# Patient Record
Sex: MALE | URBAN - METROPOLITAN AREA
[De-identification: names, ages, dates, MRNs, and addresses within clinical notes are randomized per-mention and may not be internally consistent; named-entity substitution may affect disease eponyms.]

---

## 2018-09-18 ENCOUNTER — HOSPITAL ENCOUNTER (INPATIENT)
Facility: HOSPITAL | Age: 83
LOS: 16 days | Discharge: HOME HEALTH | DRG: 369 | End: 2018-10-04
Attending: FAMILY MEDICINE | Admitting: FAMILY MEDICINE
Payer: COMMERCIAL

## 2018-09-18 DIAGNOSIS — N18.30 CKD (CHRONIC KIDNEY DISEASE), STAGE III (HCC): Chronic | ICD-10-CM

## 2018-09-18 DIAGNOSIS — R33.8 BENIGN PROSTATIC HYPERPLASIA WITH URINARY RETENTION: ICD-10-CM

## 2018-09-18 DIAGNOSIS — J30.9 ALLERGIC RHINITIS: ICD-10-CM

## 2018-09-18 DIAGNOSIS — R05.9 COUGH: ICD-10-CM

## 2018-09-18 DIAGNOSIS — J44.9 COPD (CHRONIC OBSTRUCTIVE PULMONARY DISEASE) (HCC): ICD-10-CM

## 2018-09-18 DIAGNOSIS — D50.8 OTHER IRON DEFICIENCY ANEMIA: ICD-10-CM

## 2018-09-18 DIAGNOSIS — I38 ENDOCARDITIS: Primary | ICD-10-CM

## 2018-09-18 DIAGNOSIS — N40.1 BENIGN PROSTATIC HYPERPLASIA WITH URINARY RETENTION: ICD-10-CM

## 2018-09-18 DIAGNOSIS — Z79.2 LONG TERM (CURRENT) USE OF ANTIBIOTICS: ICD-10-CM

## 2018-09-18 DIAGNOSIS — I25.10 CAD (CORONARY ARTERY DISEASE): ICD-10-CM

## 2018-09-18 DIAGNOSIS — I50.32 CHRONIC DIASTOLIC (CONGESTIVE) HEART FAILURE (HCC): ICD-10-CM

## 2018-09-18 DIAGNOSIS — I10 ESSENTIAL HYPERTENSION: Chronic | ICD-10-CM

## 2018-09-18 DIAGNOSIS — I25.2 STATUS POST NON-ST ELEVATION MYOCARDIAL INFARCTION (NSTEMI): ICD-10-CM

## 2018-09-18 DIAGNOSIS — E55.9 VITAMIN D DEFICIENCY, UNSPECIFIED: ICD-10-CM

## 2018-09-18 DIAGNOSIS — K59.00 CONSTIPATION, UNSPECIFIED CONSTIPATION TYPE: ICD-10-CM

## 2018-09-18 DIAGNOSIS — E11.9 TYPE 2 DIABETES MELLITUS WITHOUT COMPLICATION, WITHOUT LONG-TERM CURRENT USE OF INSULIN (HCC): Chronic | ICD-10-CM

## 2018-09-18 DIAGNOSIS — I48.91 ATRIAL FIBRILLATION (HCC): Chronic | ICD-10-CM

## 2018-09-18 PROBLEM — N40.0 BPH (BENIGN PROSTATIC HYPERPLASIA): Status: ACTIVE | Noted: 2018-09-18

## 2018-09-18 PROBLEM — I05.9 ENDOCARDITIS OF MITRAL VALVE: Status: ACTIVE | Noted: 2018-09-18

## 2018-09-18 PROBLEM — D64.9 ANEMIA: Status: ACTIVE | Noted: 2018-09-18

## 2018-09-18 PROBLEM — N40.0 BPH (BENIGN PROSTATIC HYPERPLASIA): Chronic | Status: ACTIVE | Noted: 2018-09-18

## 2018-09-18 PROBLEM — D64.9 ANEMIA: Chronic | Status: ACTIVE | Noted: 2018-09-18

## 2018-09-18 PROBLEM — R33.9 URINARY RETENTION: Status: ACTIVE | Noted: 2018-09-18

## 2018-09-18 PROBLEM — B37.81 CANDIDA ESOPHAGITIS (HCC): Status: ACTIVE | Noted: 2018-09-18

## 2018-09-18 LAB — GLUCOSE SERPL-MCNC: 185 MG/DL (ref 70–99)

## 2018-09-18 PROCEDURE — 99306 1ST NF CARE HIGH MDM 50: CPT | Performed by: HOSPITALIST

## 2018-09-18 PROCEDURE — 82948 REAGENT STRIP/BLOOD GLUCOSE: CPT

## 2018-09-18 RX ORDER — SIMVASTATIN 80 MG
80 TABLET ORAL
COMMUNITY
End: 2018-10-04 | Stop reason: HOSPADM

## 2018-09-18 RX ORDER — METOPROLOL SUCCINATE 50 MG/1
50 TABLET, EXTENDED RELEASE ORAL DAILY
COMMUNITY
End: 2018-10-04 | Stop reason: HOSPADM

## 2018-09-18 RX ORDER — DOCUSATE SODIUM 250 MG
250 CAPSULE ORAL DAILY
COMMUNITY
End: 2018-10-04 | Stop reason: HOSPADM

## 2018-09-18 RX ORDER — NITROGLYCERIN 0.4 MG/1
0.4 TABLET SUBLINGUAL
Status: DISCONTINUED | OUTPATIENT
Start: 2018-09-18 | End: 2018-10-04 | Stop reason: HOSPADM

## 2018-09-18 RX ORDER — ONDANSETRON 2 MG/ML
4 INJECTION INTRAMUSCULAR; INTRAVENOUS EVERY 6 HOURS PRN
Status: DISCONTINUED | OUTPATIENT
Start: 2018-09-18 | End: 2018-09-24

## 2018-09-18 RX ORDER — FLUTICASONE PROPIONATE 50 MCG
1 SPRAY, SUSPENSION (ML) NASAL DAILY
Status: DISCONTINUED | OUTPATIENT
Start: 2018-09-19 | End: 2018-10-04 | Stop reason: HOSPADM

## 2018-09-18 RX ORDER — LOSARTAN POTASSIUM 50 MG/1
100 TABLET ORAL DAILY
Status: DISCONTINUED | OUTPATIENT
Start: 2018-09-19 | End: 2018-09-24

## 2018-09-18 RX ORDER — FINASTERIDE 5 MG/1
5 TABLET, FILM COATED ORAL DAILY
Status: DISCONTINUED | OUTPATIENT
Start: 2018-09-19 | End: 2018-10-04 | Stop reason: HOSPADM

## 2018-09-18 RX ORDER — PANTOPRAZOLE SODIUM 40 MG/1
40 TABLET, DELAYED RELEASE ORAL
Status: DISCONTINUED | OUTPATIENT
Start: 2018-09-19 | End: 2018-09-24

## 2018-09-18 RX ORDER — MELATONIN
1000 DAILY
Status: DISCONTINUED | OUTPATIENT
Start: 2018-09-19 | End: 2018-10-04 | Stop reason: HOSPADM

## 2018-09-18 RX ORDER — POTASSIUM CHLORIDE 20 MEQ/1
20 TABLET, EXTENDED RELEASE ORAL 2 TIMES DAILY
Status: DISCONTINUED | OUTPATIENT
Start: 2018-09-18 | End: 2018-09-24

## 2018-09-18 RX ORDER — VALSARTAN 160 MG/1
160 TABLET ORAL DAILY
COMMUNITY
End: 2018-10-04 | Stop reason: HOSPADM

## 2018-09-18 RX ORDER — FLUTICASONE PROPIONATE 50 MCG
1 SPRAY, SUSPENSION (ML) NASAL DAILY
COMMUNITY
End: 2018-10-04 | Stop reason: HOSPADM

## 2018-09-18 RX ORDER — ACETAMINOPHEN 325 MG/1
650 TABLET ORAL EVERY 6 HOURS PRN
Status: DISCONTINUED | OUTPATIENT
Start: 2018-09-18 | End: 2018-10-04 | Stop reason: HOSPADM

## 2018-09-18 RX ORDER — DOCUSATE SODIUM 100 MG/1
100 CAPSULE, LIQUID FILLED ORAL 2 TIMES DAILY
Status: DISCONTINUED | OUTPATIENT
Start: 2018-09-18 | End: 2018-10-04 | Stop reason: HOSPADM

## 2018-09-18 RX ORDER — PANTOPRAZOLE SODIUM 40 MG/1
40 TABLET, DELAYED RELEASE ORAL DAILY
COMMUNITY
End: 2018-10-04 | Stop reason: HOSPADM

## 2018-09-18 RX ORDER — DIPHENOXYLATE HYDROCHLORIDE AND ATROPINE SULFATE 2.5; .025 MG/1; MG/1
1 TABLET ORAL DAILY
COMMUNITY

## 2018-09-18 RX ORDER — FINASTERIDE 5 MG/1
5 TABLET, FILM COATED ORAL DAILY
COMMUNITY
End: 2018-10-04 | Stop reason: HOSPADM

## 2018-09-18 RX ORDER — ATORVASTATIN CALCIUM 40 MG/1
40 TABLET, FILM COATED ORAL
Status: DISCONTINUED | OUTPATIENT
Start: 2018-09-19 | End: 2018-10-04 | Stop reason: HOSPADM

## 2018-09-18 RX ORDER — BENZONATATE 100 MG/1
200 CAPSULE ORAL 3 TIMES DAILY PRN
Status: DISCONTINUED | OUTPATIENT
Start: 2018-09-18 | End: 2018-10-04 | Stop reason: HOSPADM

## 2018-09-18 RX ORDER — POTASSIUM CHLORIDE 20 MEQ/1
20 TABLET, EXTENDED RELEASE ORAL 2 TIMES DAILY
COMMUNITY
End: 2018-10-04 | Stop reason: HOSPADM

## 2018-09-18 RX ORDER — MELATONIN
1000 DAILY
COMMUNITY
End: 2018-10-04 | Stop reason: HOSPADM

## 2018-09-18 RX ORDER — SENNOSIDES 8.6 MG
1 TABLET ORAL DAILY
Status: DISCONTINUED | OUTPATIENT
Start: 2018-09-19 | End: 2018-10-04 | Stop reason: HOSPADM

## 2018-09-18 RX ORDER — GLIPIZIDE 5 MG/1
5 TABLET ORAL
COMMUNITY
End: 2018-10-04 | Stop reason: HOSPADM

## 2018-09-18 RX ORDER — FUROSEMIDE 20 MG/1
20 TABLET ORAL DAILY
Status: DISCONTINUED | OUTPATIENT
Start: 2018-09-19 | End: 2018-09-24

## 2018-09-18 RX ORDER — METOPROLOL SUCCINATE 50 MG/1
50 TABLET, EXTENDED RELEASE ORAL DAILY
Status: DISCONTINUED | OUTPATIENT
Start: 2018-09-19 | End: 2018-10-04 | Stop reason: HOSPADM

## 2018-09-18 RX ORDER — GLIPIZIDE 5 MG/1
5 TABLET ORAL
Status: DISCONTINUED | OUTPATIENT
Start: 2018-09-19 | End: 2018-09-24

## 2018-09-18 RX ORDER — BENZONATATE 200 MG/1
200 CAPSULE ORAL 3 TIMES DAILY PRN
COMMUNITY
End: 2018-10-04 | Stop reason: HOSPADM

## 2018-09-18 RX ORDER — FLUCONAZOLE 100 MG/1
200 TABLET ORAL DAILY
Status: DISCONTINUED | OUTPATIENT
Start: 2018-09-19 | End: 2018-09-18

## 2018-09-18 RX ORDER — NITROGLYCERIN 0.4 MG/1
0.4 TABLET SUBLINGUAL
COMMUNITY

## 2018-09-18 RX ORDER — GUAIFENESIN 100 MG/5ML
200 SOLUTION ORAL 3 TIMES DAILY PRN
Status: DISCONTINUED | OUTPATIENT
Start: 2018-09-18 | End: 2018-09-24

## 2018-09-18 RX ORDER — FUROSEMIDE 20 MG/1
20 TABLET ORAL DAILY
COMMUNITY
End: 2018-10-04 | Stop reason: HOSPADM

## 2018-09-18 RX ADMIN — POTASSIUM CHLORIDE 20 MEQ: 20 TABLET, EXTENDED RELEASE ORAL at 20:51

## 2018-09-18 RX ADMIN — INSULIN LISPRO 1 UNITS: 100 INJECTION, SOLUTION INTRAVENOUS; SUBCUTANEOUS at 21:32

## 2018-09-18 RX ADMIN — AMPICILLIN SODIUM 2000 MG: 2 INJECTION, POWDER, FOR SOLUTION INTRAMUSCULAR; INTRAVENOUS at 21:25

## 2018-09-18 RX ADMIN — CEFTRIAXONE 2000 MG: 2 INJECTION, SOLUTION INTRAVENOUS at 20:51

## 2018-09-18 NOTE — ASSESSMENT & PLAN NOTE
With urinary retention continue Flomax and finasteride  Follow up with outpatient   Monitor for urinary tension

## 2018-09-18 NOTE — ASSESSMENT & PLAN NOTE
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

## 2018-09-18 NOTE — ASSESSMENT & PLAN NOTE
EGD from Aug 15, 2018 reported Candida esophagitis  Patient completed 4 days course of fluconazole  Continue PPI

## 2018-09-18 NOTE — ASSESSMENT & PLAN NOTE
Secondary to acute anemia ==> supply demand mismatch, type 2 NSTEMI  Status post 2 units of PRBC  Transfusion at [facility]  Echocardiogram from Sep 6, 2018 reported ejection fraction to be 55% normal left ventricle chamber size inferior and inferior lateral hypokinesis  Patient underwent EGD which did not reveal a source of bleeding colonoscopy was postponed due to acuty  of condition  No anticoagulation recommended  Recommended to continue aspirin however I did see aspirin on his discharge medication  Outpatient cardiology follow-up recommended ,I will defer need for possible inpatient cardiology consult to clarify need of aspirin to the primary team

## 2018-09-18 NOTE — ASSESSMENT & PLAN NOTE
Not a candidate for [facility] given the profound anemia required blood transfusion  Patient will follow up with PCP and Cardiology to re-evaluate [facility]  Continue heart rate control medication  Consider inpatient cardiology consult to clarify need of aspirin

## 2018-09-19 LAB
ALBUMIN SERPL BCP-MCNC: 2.8 G/DL (ref 3–5.2)
ALP SERPL-CCNC: 70 U/L (ref 43–122)
ALT SERPL W P-5'-P-CCNC: 23 U/L (ref 9–52)
ANION GAP SERPL CALCULATED.3IONS-SCNC: 3 MMOL/L (ref 5–14)
AST SERPL W P-5'-P-CCNC: 25 U/L (ref 17–59)
BILIRUB SERPL-MCNC: 0.3 MG/DL
BUN SERPL-MCNC: 35 MG/DL (ref 5–25)
CALCIUM SERPL-MCNC: 8.8 MG/DL (ref 8.4–10.2)
CHLORIDE SERPL-SCNC: 99 MMOL/L (ref 97–108)
CO2 SERPL-SCNC: 33 MMOL/L (ref 22–30)
CREAT SERPL-MCNC: 1.1 MG/DL (ref 0.7–1.5)
EOSINOPHIL # BLD AUTO: 0.94 THOUSAND/UL (ref 0–0.4)
EOSINOPHIL NFR BLD MANUAL: 17 % (ref 0–6)
ERYTHROCYTE [DISTWIDTH] IN BLOOD BY AUTOMATED COUNT: 18.2 %
EST. AVERAGE GLUCOSE BLD GHB EST-MCNC: 143 MG/DL
GFR SERPL CREATININE-BSD FRML MDRD: 59 ML/MIN/1.73SQ M
GLUCOSE P FAST SERPL-MCNC: 131 MG/DL (ref 70–99)
GLUCOSE SERPL-MCNC: 126 MG/DL (ref 70–99)
GLUCOSE SERPL-MCNC: 131 MG/DL (ref 70–99)
GLUCOSE SERPL-MCNC: 133 MG/DL (ref 70–99)
GLUCOSE SERPL-MCNC: 156 MG/DL (ref 70–99)
GLUCOSE SERPL-MCNC: 160 MG/DL (ref 70–99)
HBA1C MFR BLD: 6.6 % (ref 4.2–6.3)
HCT VFR BLD AUTO: 28 % (ref 41–53)
HGB BLD-MCNC: 9.5 G/DL (ref 13.5–17.5)
HYPERCHROMIA BLD QL SMEAR: PRESENT
LYMPHOCYTES # BLD AUTO: 0.83 THOUSAND/UL (ref 0.5–4)
LYMPHOCYTES # BLD AUTO: 15 % (ref 20–50)
MAGNESIUM SERPL-MCNC: 1.6 MG/DL (ref 1.6–2.3)
MCH RBC QN AUTO: 30.7 PG (ref 26.8–34.3)
MCHC RBC AUTO-ENTMCNC: 33.8 G/DL (ref 31.4–37.4)
MCV RBC AUTO: 91 FL (ref 80–100)
MONOCYTES # BLD AUTO: 0.33 THOUSAND/UL (ref 0.2–0.9)
MONOCYTES NFR BLD AUTO: 6 % (ref 1–10)
NEUTS BAND NFR BLD MANUAL: 3 % (ref 0–8)
NEUTS SEG # BLD: 3.41 THOUSAND/UL (ref 1.8–7.8)
NEUTS SEG NFR BLD AUTO: 59 %
PLATELET # BLD AUTO: 272 THOUSANDS/UL (ref 150–450)
PLATELET BLD QL SMEAR: ADEQUATE
PMV BLD AUTO: 7.2 FL (ref 8.9–12.7)
POTASSIUM SERPL-SCNC: 4.9 MMOL/L (ref 3.6–5)
PROT SERPL-MCNC: 5.5 G/DL (ref 5.9–8.4)
RBC # BLD AUTO: 3.09 MILLION/UL (ref 4.5–5.9)
RBC MORPH BLD: ABNORMAL
SODIUM SERPL-SCNC: 135 MMOL/L (ref 137–147)
TOTAL CELLS COUNTED SPEC: 100
WBC # BLD AUTO: 5.5 THOUSAND/UL (ref 4.31–10.16)

## 2018-09-19 PROCEDURE — 97530 THERAPEUTIC ACTIVITIES: CPT

## 2018-09-19 PROCEDURE — 97163 PT EVAL HIGH COMPLEX 45 MIN: CPT

## 2018-09-19 PROCEDURE — 97167 OT EVAL HIGH COMPLEX 60 MIN: CPT

## 2018-09-19 PROCEDURE — 97535 SELF CARE MNGMENT TRAINING: CPT

## 2018-09-19 PROCEDURE — 80053 COMPREHEN METABOLIC PANEL: CPT | Performed by: HOSPITALIST

## 2018-09-19 PROCEDURE — 83735 ASSAY OF MAGNESIUM: CPT | Performed by: HOSPITALIST

## 2018-09-19 PROCEDURE — 83036 HEMOGLOBIN GLYCOSYLATED A1C: CPT | Performed by: HOSPITALIST

## 2018-09-19 PROCEDURE — 85007 BL SMEAR W/DIFF WBC COUNT: CPT | Performed by: HOSPITALIST

## 2018-09-19 PROCEDURE — 82948 REAGENT STRIP/BLOOD GLUCOSE: CPT

## 2018-09-19 PROCEDURE — 85027 COMPLETE CBC AUTOMATED: CPT | Performed by: HOSPITALIST

## 2018-09-19 RX ADMIN — LOSARTAN POTASSIUM 100 MG: 50 TABLET, FILM COATED ORAL at 09:28

## 2018-09-19 RX ADMIN — METOPROLOL SUCCINATE 50 MG: 50 TABLET, EXTENDED RELEASE ORAL at 09:30

## 2018-09-19 RX ADMIN — SENNOSIDES 8.6 MG: 8.6 TABLET, FILM COATED ORAL at 09:30

## 2018-09-19 RX ADMIN — GLIPIZIDE 5 MG: 5 TABLET ORAL at 18:05

## 2018-09-19 RX ADMIN — FINASTERIDE 5 MG: 5 TABLET, FILM COATED ORAL at 09:30

## 2018-09-19 RX ADMIN — AMPICILLIN SODIUM 2000 MG: 2 INJECTION, POWDER, FOR SOLUTION INTRAMUSCULAR; INTRAVENOUS at 15:45

## 2018-09-19 RX ADMIN — POTASSIUM CHLORIDE 20 MEQ: 20 TABLET, EXTENDED RELEASE ORAL at 18:02

## 2018-09-19 RX ADMIN — FLUTICASONE PROPIONATE 1 SPRAY: 50 SPRAY, METERED NASAL at 09:31

## 2018-09-19 RX ADMIN — Medication 1 TABLET: at 09:28

## 2018-09-19 RX ADMIN — AMPICILLIN SODIUM 2000 MG: 2 INJECTION, POWDER, FOR SOLUTION INTRAMUSCULAR; INTRAVENOUS at 09:24

## 2018-09-19 RX ADMIN — VITAMIN D, TAB 1000IU (100/BT) 1000 UNITS: 25 TAB at 09:28

## 2018-09-19 RX ADMIN — INSULIN LISPRO 1 UNITS: 100 INJECTION, SOLUTION INTRAVENOUS; SUBCUTANEOUS at 12:42

## 2018-09-19 RX ADMIN — POTASSIUM CHLORIDE 20 MEQ: 20 TABLET, EXTENDED RELEASE ORAL at 09:30

## 2018-09-19 RX ADMIN — FUROSEMIDE 20 MG: 20 TABLET ORAL at 09:28

## 2018-09-19 RX ADMIN — GLIPIZIDE 5 MG: 5 TABLET ORAL at 07:02

## 2018-09-19 RX ADMIN — CEFTRIAXONE 2000 MG: 2 INJECTION, SOLUTION INTRAVENOUS at 20:27

## 2018-09-19 RX ADMIN — DOCUSATE SODIUM 100 MG: 100 CAPSULE, LIQUID FILLED ORAL at 09:30

## 2018-09-19 RX ADMIN — DOCUSATE SODIUM 100 MG: 100 CAPSULE, LIQUID FILLED ORAL at 18:04

## 2018-09-19 RX ADMIN — SITAGLIPTIN 100 MG: 100 TABLET, FILM COATED ORAL at 09:29

## 2018-09-19 RX ADMIN — AMPICILLIN SODIUM 2000 MG: 2 INJECTION, POWDER, FOR SOLUTION INTRAMUSCULAR; INTRAVENOUS at 21:18

## 2018-09-19 RX ADMIN — PANTOPRAZOLE SODIUM 40 MG: 40 TABLET, DELAYED RELEASE ORAL at 06:08

## 2018-09-19 RX ADMIN — CEFTRIAXONE 2000 MG: 2 INJECTION, SOLUTION INTRAVENOUS at 09:24

## 2018-09-19 RX ADMIN — INSULIN LISPRO 1 UNITS: 100 INJECTION, SOLUTION INTRAVENOUS; SUBCUTANEOUS at 21:18

## 2018-09-19 RX ADMIN — ATORVASTATIN CALCIUM 40 MG: 40 TABLET, FILM COATED ORAL at 18:03

## 2018-09-19 RX ADMIN — AMPICILLIN SODIUM 2000 MG: 2 INJECTION, POWDER, FOR SOLUTION INTRAMUSCULAR; INTRAVENOUS at 02:21

## 2018-09-19 NOTE — ASSESSMENT & PLAN NOTE
Will check CBC in a m    Status post 2 units of packed red blood cell at [facility]  EGD showed Candida esophagitis patient cannot complete a colonoscopy due to acuity  of condition / NSTEMI  Continue PPI  Discontinued AC on discharge from [facility]   follow up GI recommendations

## 2018-09-19 NOTE — PROGRESS NOTES
RECREATIONAL THERAPY PARTICIPATION LOG      ACTIVITY:    GAMES:        BINGO:        MUSIC STIM:        ARTS & CRAFTS:        EXERCISE: Ambulating with walker in hallway to join in [employer]"  CLUBS & MEETING:        SOCIALS: Resident participated in "[employer]", accompanied by his Son, and he was very conversational in this small group activity  Resident shared with us that he was in the Service during The [address] [place] [address] Street  SPIRITUAL:        INDEPENDENT:        1:1:  Resident was seen for an Initial Recreational Therapy/Activity Program greeting and introduction  Resident was being visited by his Son  Resident learned about the leisure opportunities available to him in [facility] , including [employer] [place]  JAKE Stevens

## 2018-09-19 NOTE — PLAN OF CARE
Problem: OCCUPATIONAL THERAPY ADULT  Goal: Performs self-care activities at highest level of function for planned discharge setting  See evaluation for individualized goals  Treatment Interventions: ADL retraining, Functional transfer training, UE strengthening/ROM, Endurance training, Patient/family training, Cognitive reorientation, Equipment evaluation/education, Neuromuscular reeducation, Activityengagement  Equipment Recommended:  (TBD)       See flowsheet documentation for full assessment, interventions and recommendations  Limitation: Decreased ADL status, Decreased UE strength, Decreased cognition, Decreased endurance, Decreased self-care trans, Decreased high-level ADLs  Prognosis: Good  Assessment: Pt admit to [facility] after being treated for endocarditis of mitral valve at [facility]  However, recently hospitalized in August for JAKE, hydronephrosis, rhabdomyolysis, and elevated troponins due to NSTEMI  OT completed brief/expanded/extensive review of pt's medical and social history  Pt with h/o CKD, HTN, DM2, anemia, A-fib, BPH, candida esophagitis, and CAD  Prior to admit was living alone and able to complete all tasks (I)'ly  Pt presents to OT below baseline due to the following performance deficits:  strength; balance; stand tolerance; functional mobility; problem solving; awareness; community integration; self care; and IADLs  Therefore, pt would benefit from OT services to achieve optimal level of performance  Occupational performance areas to be addressed include: bathing, toileting, dressing, activity tolerance, functional mobility, community integration, clothing management, meal prep,and home management  Pt is alert and oriented but presents with an element of confusion  Continues on IV ABXs  Based on findings, pt is of high complexity  Plan is to return home with services pending progress  Recommend care conference to discuss safe discharge plans  High-complexity + 25  mins tx        OT Discharge Recommendation: Home OT  OT - OK to Discharge: No    Pt will achieve the following goals in 1 5 weeks    UB ADL- (I)   LB ADL- MI/I   Toileting- MI/I with hygiene and clothing management    Bed Mobility- MI/I with bed flat and no SR to prep for purposeful tasks   ADL Txfs- MI using most appropriate method for ADLs   Stand Balance- F+ dynamic & unsupported for clothing management    Stand Tolerance- 5-7 mins for showering   Tub/Shower- MI using most appropriate method; educate on AE   Meal Prep- MI with good safety    Home Management (laundry & bed making)- MI without LOB    Item Retrieval- MI with good safety; educate on AE PRN    Activity Tolerance- G- for ADLs   Pt will participate in [facility] assessment to determine level of safety for retuning home   Pt will achieve 4+/5 UE strength for ADL txfs    Delicia Diaz, OT

## 2018-09-19 NOTE — ASSESSMENT & PLAN NOTE
No results found for: HGBA1C  Will continue ADA diet Accu-Cheks insulin sliding scale  Lantus was discontinued due to hypoglycemia at [facility]  Will continue oral DM Rx  Check hemoglobin A1c in a m

## 2018-09-19 NOTE — H&P
H&P- Ethyl Clear 6/21/1929, 80 y o  male MRN: [id number]    Unit/Bed#: [facility] 547-01 Encounter: [id number]    Primary Care Provider: Oriana Baca MD   Date and time admitted to hospital: 9/18/2018  6:15 PM        * Endocarditis of mitral valve   Assessment & Plan    The patient was transferred from [facility] to [facility] complete a course of ampicillin and ceftriaxone for E faecalis endocarditis  Positive blood cultures on August 2nd and Aug 9, 2018   repeat blood culture on Aug 11, 2018 on negative  LAILA on  Aug 16, 2018 reported 0 7 cm liner echodensity on the posterior leaflet of the mitral valve, no indwelling cardiac devices or prosthetic joint  Will continue ampicillin 2 g every 6 hours and ceftriaxone 2 g every 12 hours until Sep 26, 2018  Lt UE PICC line in place, no signs of infection, nursing care to prevent line associated infection according to the intra hospital policy  Continue rehab PT OT evaluation  Basic blood work in a m            Status post non-ST elevation myocardial infarction (NSTEMI)   Assessment & Plan    Secondary to acute anemia ==> supply demand mismatch, type 2 NSTEMI  Status post 2 units of PRBC  Transfusion at [facility]  Echocardiogram from Sep 6, 2018 reported ejection fraction to be 55% normal left ventricle chamber size inferior and inferior lateral hypokinesis  Patient underwent EGD which did not reveal a source of bleeding colonoscopy was postponed due to acuty  of condition  No anticoagulation recommended  Recommended to continue aspirin however I did see aspirin on his discharge medication  Outpatient cardiology follow-up recommended ,I will defer need for possible inpatient cardiology consult to clarify need of aspirin to the primary team          Chronic diastolic (congestive) heart failure ([facility] [address] )   Assessment & Plan    Continue BB Lasix  Potassium supplement  Monitor volume status ,blood pressure        Candida esophagitis ([facility] [address] )   Assessment & Plan    EGD from Aug 15, 2018 reported Candida esophagitis  Patient completed 4 days course of fluconazole  Continue PPI        BPH (benign prostatic hyperplasia)   Assessment & Plan    With urinary retention continue Flomax and finasteride  Follow up with outpatient   Monitor for urinary tension        Atrial fibrillation [facility])   Assessment & Plan    Not a candidate for [facility] given the profound anemia required blood transfusion  Patient will follow up with PCP and Cardiology to re-evaluate [facility]  Continue heart rate control medication  Consider inpatient cardiology consult to clarify need of aspirin        Anemia   Assessment & Plan    Will check CBC in a m  Status post 2 units of packed red blood cell at [facility]  EGD showed Candida esophagitis patient cannot complete a colonoscopy due to acuity  of condition / NSTEMI  Continue PPI  Discontinued AC on discharge from [facility]   follow up GI recommendations        CKD (chronic kidney disease), stage III   Assessment & Plan    The most recent creatinine 1 15  Will monitor renal function  Avoid nephrotoxin drugs        Essential hypertension   Assessment & Plan    Continue BB, Lasix, losartan  Monitor blood pressure        Type 2 diabetes mellitus without complication, without long-term current use of insulin ([facility])   Assessment & Plan    No results found for: HGBA1C  Will continue ADA diet Accu-Cheks insulin sliding scale  Lantus was discontinued due to hypoglycemia at [facility]  Will continue oral DM Rx  Check hemoglobin A1c in a m                Coronary artery disease involving native coronary artery   Assessment & Plan    Continue metoprolol              VTE Prophylaxis: Pharmacologic VTE Prophylaxis contraindicated due to Acute anemia  / sequential compression device   Code Status: full, discussed with the  patient ,patient's son and daughter  POLST: There is no POLST form on file for this patient (pre-hospital)  Discussion with family:   Patient's son Stephan Quezada patient daughter Srinivasa Costello at bedside    Anticipated Length of Stay:  Patient will be admitted on an SNF Short Term Inpatient basis with an anticipated length of stay of  > 2 midnights  Justification for Hospital Stay:   Rehab and to complete antibiotic on September 26     Total Time for Visit, including Counseling / Coordination of Care: 45 minutes  Greater than 50% of this total time spent on direct patient counseling and coordination of care  Chief Complaint:   none    History of Present Illness:    Anthony Barbosa is a 80 y o  very pleasant male who was transferred from [facility] to complete a course of antibiotic for endocarditis, continue with rehab  Patient is hard of hearing, good historian though, his family members ,including patient's son Kassidy Vazquez, daughter Agnes Johnson , are at bedside and helping with HPI  According to [facility] discharge summary patient was hospitalized on 08/02/18-08/23/18 with JAKE I due to volume depletion and concurrent ACE inhibitor and ARB use +obstructive uropathy with hydronephrosis, rhabdomyolysis, elevated troponin deemed to be secondary to type 2 NSTEMI /supply demand mismatch   and anemia with positive stool guaiac  IV fluids started     Patient was evaluated by GI specialist, underwent EGD on August 15 which revealed a moderate size hiatal hernia and mild Candida esophagitis no evidence of ulcer disease found  He completed at 4 days of fluconazole ,currently on PPI   Blood culture grew E faecalis and patient underwent LAILA  which revealed mitral valve endocarditis  He was evaluated by ID specialist who recommended to continued ceftriaxone and ampicillin until September 26, PICC line was placed and patient was discharged to inpatient rehab on August 24  Despite of dark stool repeated FOBT's have been negative, on September 5 his hemoglobin dropped to 5 7, troponin trended up, patient was readmitted, transfused 2 units of packed red blood cells and  re-evaluated by GI     After discussion with the family it was decided to perform barium enema which was limited due to poor prep but showed no obvious lesions or mass  Patient's son reports that he did not underwent colonoscopy only EGD  Cardiology felt that his elevated troponin was  secondary to supply demand ischemia due to his acute anemia and tachycardia, for his atrial fibrillation with episode of RVR not an AC recommended due to profound anemia   During hospitalization patient also dropped his sugar to 70 and Lantus was discontinued  Patient recommended to follow up with  ID , Dr Trinidad/Erik,GI ,cardiology and Urology as outpatient  Given deconditioning and need of IV antibiotic rehab was recommended  On Sep 18, 2018 patient was transferred to [facility] at [facility]  Upon my assessment patient denies any complaints  WBC on September 17  5 8 ,hemoglobin 9 4   LAILA on August 15 reported ejection fraction to be 60-65% small liner 0 7 cm echodensity located on the atrial surface of the posterior leaflet of the mitral valve, can't exclude vegetation  Blood  Cx  on August 2nd 11 grew enterococcal faecalis ampicillin susceptible  Patient remains afebrile and hemodynamically stable he admitted on an inpatient basis to the hospitalist service with rehab specialist consult in am   Aleksandar , patient's  daughter , Radha Dotson [phone]  Timothy Mchugh, patient's daughter,tel  [phone]  Patient is full code  Review of Systems:    Review of Systems   All other systems reviewed and are negative  Past Medical and Surgical History:     History reviewed  No pertinent past medical history  History reviewed  No pertinent surgical history  Meds/Allergies:    Prior to Admission medications    Medication Sig Start Date End Date Taking?  Authorizing Provider   benzonatate (TESSALON) 200 MG capsule Take 200 mg by mouth 3 (three) times a day as needed for cough   Yes Historical Provider, MD   cholecalciferol (VITAMIN D3) 1,000 units tablet Take 1,000 Units by mouth daily   Yes Historical Provider, MD   docusate sodium (COLACE) 250 MG capsule Take 250 mg by mouth daily   Yes Historical Provider, MD   finasteride (PROSCAR) 5 mg tablet Take 5 mg by mouth daily   Yes Historical Provider, MD   fluticasone (FLONASE) 50 mcg/act nasal spray 1 spray into each nostril daily   Yes Historical Provider, MD   furosemide (LASIX) 20 mg tablet Take 20 mg by mouth daily   Yes Historical Provider, MD   glipiZIDE (GLUCOTROL) 5 mg tablet Take 5 mg by mouth 2 (two) times a day before meals   Yes Historical Provider, MD   guaiFENesin (ROBITUSSIN) 100 MG/5ML oral liquid Take 200 mg by mouth 3 (three) times a day as needed for cough   Yes Historical Provider, MD   ipratropium (ATROVENT) 0 02 % nebulizer solution Take 0 5 mg by nebulization every 6 (six) hours as needed for wheezing or shortness of breath   Yes Historical Provider, MD   metoprolol succinate (TOPROL-XL) 50 mg 24 hr tablet Take 50 mg by mouth daily   Yes Historical Provider, MD   multivitamin (THERAGRAN) TABS Take 1 tablet by mouth daily   Yes Historical Provider, MD   nitroglycerin (NITROSTAT) 0 4 mg SL tablet Place 0 4 mg under the tongue every 5 (five) minutes as needed for chest pain   Yes Historical Provider, MD   pantoprazole (PROTONIX) 40 mg tablet Take 40 mg by mouth daily   Yes Historical Provider, MD   potassium chloride (K-DUR,KLOR-CON) 20 mEq tablet Take 20 mEq by mouth 2 (two) times a day   Yes Historical Provider, MD   simvastatin (ZOCOR) 80 mg tablet Take 80 mg by mouth daily at bedtime   Yes Historical Provider, MD   sitaGLIPtin (JANUVIA) 100 mg tablet Take 100 mg by mouth daily   Yes Historical Provider, MD   valsartan (DIOVAN) 160 mg tablet Take 160 mg by mouth daily   Yes Historical Provider, MD     I have reviewed home medications with a medical source (PCP, Pharmacy, other)      Allergies: No Known Allergies    Social History:     Marital Status: Unknown   Occupation: none  Patient Pre-hospital Living Situation:  home  Patient Pre-hospital Level of Mobility: reg  Patient Pre-hospital Diet Restrictions:  reg  Substance Use History:   History   Alcohol use Not on file     History   Smoking Status    Not on file   Smokeless Tobacco    Not on file     History   Drug use: Unknown       Family History:    non-contributory    Physical Exam:     Vitals:   Blood Pressure: 105/50 (09/18/18 1822)  Pulse: 89 (09/18/18 1822)  Temperature: 98 4 °F (36 9 °C) (09/18/18 1820)  Temp Source: Temporal (09/18/18 1820)  Respirations: 20 (09/18/18 1820)  Height: 5' 11" (180 3 cm) (09/18/18 1910)  Weight - Scale: 70 kg (154 lb 5 2 oz) (09/18/18 1910)  SpO2: 96 % (09/18/18 1820)    Physical Exam   Constitutional: No distress  HENT:   Head: Normocephalic  Eyes: Pupils are equal, round, and reactive to light  Neck: Normal range of motion  Cardiovascular:   Irregularly  irregular heart rate   Pulmonary/Chest:   Decreased breath sounds bilateral   Abdominal: He exhibits no distension  Musculoskeletal: He exhibits no edema  Left upper extremity PICC line   Neurological: He is alert  Skin: Skin is warm  Additional Data:     Lab Results: I have personally reviewed pertinent reports  Invalid input(s): LABALBU        Results from last 7 days  Lab Units 09/18/18 2033   POC GLUCOSE mg/dl 185*           Imaging: I have personally reviewed pertinent reports  No orders to display       Allscripts / Epic Records Reviewed: Yes     ** Please Note: This note has been constructed using a voice recognition system   **

## 2018-09-19 NOTE — PLAN OF CARE
Problem: PHYSICAL THERAPY ADULT  Goal: Performs mobility at highest level of function for planned discharge setting  See evaluation for individualized goals  Treatment/Interventions: ADL retraining, Functional transfer training, LE strengthening/ROM, Elevations, Therapeutic exercise, Endurance training, Cognitive reorientation, Patient/family training, Equipment eval/education, Bed mobility, Gait training, Spoke to nursing, OT, Family, Spoke to case management (Speak to )  Equipment Recommended: Other (Comment) (To be determined)       See flowsheet documentation for full assessment, interventions and recommendations  Outcome: Progressing  Prognosis: Fair  Problem List: Decreased strength, Decreased endurance, Impaired balance, Decreased mobility, Decreased cognition, Impaired judgement, Decreased safety awareness, Impaired vision, Impaired hearing  Assessment: In summary, guiding factors including patient history, examination of body system(s), clinical presentation and clinical decision making were considered  Patient presents with comorbid conditions that impact function, context of current functional limitations as compared to the prior level of function, lacking physical support, recent hospital admission and with living environment deficits  Patient also presents with edema bilateral lower legs, impaired cognition, safety awareness, bilateral hip flexor strength, bed mobility, transfers, endurance for activity, standing balance and gait abilities  Clinical presentation is unstable/unpredictable at this time  The assigned level of complexity is: high  Patient would benefit from continued PT treatment to address deficits as defined above and restore or maximize level of functional mobility with consistency in order to facilitate  return to prior/baseline level of function, return to home with BRYANT and increased independence in home alone environment    Barriers to Discharge: Inaccessible home environment, Decreased caregiver support, Other (Comment) (Lives alone)                See flowsheet documentation for full assessment

## 2018-09-19 NOTE — OCCUPATIONAL THERAPY NOTE
[address] [facility] Evaluation & Treatment Note     Patient Name: Dylan Parr  Today's Date: 9/19/2018  Problem List  Patient Active Problem List   Diagnosis    Endocarditis of mitral valve    Status post non-ST elevation myocardial infarction (NSTEMI)    Candida esophagitis (HCC)    Chronic diastolic (congestive) heart failure (HCC)    Type 2 diabetes mellitus without complication, without long-term current use of insulin ([address] )    Essential hypertension    BPH (benign prostatic hyperplasia)    CKD (chronic kidney disease), stage III    Atrial fibrillation (HCC)    Anemia    Coronary artery disease involving native coronary artery     Past Medical History  History reviewed  No pertinent past medical history  Past Surgical History  History reviewed  No pertinent surgical history  Time: High eval + 25 mins tx   Remains seated in chair with all needs and alarm intact at end of session      09/19/18 0835   Note Type   Note type Eval/Treat   Restrictions/Precautions   Other Precautions Cognitive; Chair Alarm; Bed Alarm; Fall Risk;Hard of hearing;Limb alert  (+ butt )   Pain Assessment   Pain Assessment 0-10   Pain Score No Pain   Home Living   Type of Home House  (2 BRYANT w/ no HR)   Home Layout Two level  (Bed/Bath 1st fl )   Bathroom Shower/Tub Tub/shower unit   Bathroom Toilet Standard   Bathroom Equipment (Stands to shower )   [address] Other (Comment)  (per pt no DME )   Additional Comments Sleeps in regular bed    Prior Function   Level of [place] Independent with ADLs and functional mobility   Lives With Alone   Receives Help From Family; [address] in the last 6 months 1 to 4  (1 per pt )   Vocational Retired   Lifestyle   Autonomy Pt states he's (I) with ADLs, home tasks, and functional mobility  Stays on 1st fl s/u  Sleeps in regular bed  + drives  R handed  Family and neighbors check on him PRN  Reciprocal Relationships Family & neighbors   Intrinsic Gratification Watching football    Psychosocial   Psychosocial (WDL) WDL   Subjective   Subjective "I think the only thing weak on me are my legs"    ADL   Grooming Assistance 7  Independent   Grooming Deficit (Comb hair )   LB Dressing Assistance 6  Modified independent   LB Dressing Deficit ([place] sneakers using tailor sit method; assist to tie only  )   Transfers   Sit to Stand 5  Supervision   Additional items (Good hand placement)   Stand to Sit 5  Supervision   Additional items (Controlled descent )   Stand pivot 5  Supervision   Additional items (Using RW )   Functional Mobility   Functional Mobility 5  Supervision   Additional Comments Using RW    Balance   Static Sitting Good   Dynamic Sitting (Good- )   Static Standing Fair +   Dynamic Standing Fair   Ambulatory Fair   Activity Tolerance   Activity Tolerance (Fair- activity tolerance )   RUE Assessment   RUE Assessment (4-/5; 4/5; 4/5)   LUE Assessment   LUE Assessment (4-/5; 4/5; 4/5)   Vision-Basic Assessment   Current Vision Wears glasses only for reading   Cognition   Overall Cognitive Status WFL   Arousal/Participation Cooperative   Attention Within functional limits   Orientation Level Oriented to person;Oriented to time;Oriented to place   Memory Decreased short term memory   Following Commands Follows all commands and directions without difficulty   Comments Oriented but confused comments at times    Assessment   Limitation Decreased ADL status; Decreased UE strength;Decreased cognition;Decreased endurance;Decreased self-care trans;Decreased high-level ADLs   Prognosis Good   Assessment Pt admit to [facility] after being treated for endocarditis of mitral valve at [facility]  However, recently hospitalized in August for JAKE, hydronephrosis, rhabdomyolysis, and elevated troponins due to NSTEMI  OT completed brief/expanded/extensive review of pt's medical and social history   Pt with h/o CKD, HTN, DM2, anemia, A-fib, BPH, candida esophagitis, and CAD  Prior to admit was living alone and able to complete all tasks (I)'ly  Pt presents to OT below baseline due to the following performance deficits:  strength; balance; stand tolerance; functional mobility; problem solving; awareness; community integration; self care; and IADLs  Therefore, pt would benefit from OT services to achieve optimal level of performance  Occupational performance areas to be addressed include: bathing, toileting, dressing, activity tolerance, functional mobility, community integration, clothing management, meal prep,and home management  Pt is alert and oriented but presents with an element of confusion  Continues on IV ABXs  Based on findings, pt is of high complexity  Plan is to return home with services pending progress  Recommend care conference to discuss safe discharge plans  High-complexity + 25  mins tx  Goals   Patient Goals "Do all the things I did prior"    Plan   Treatment Interventions ADL retraining;Functional transfer training;UE strengthening/ROM; Endurance training;Patient/family training;Cognitive reorientation;Equipment evaluation/education; Neuromuscular reeducation; Activityengagement   Goal Expiration Date 09/28/18   Treatment Day 1   OT Frequency (6x/wk for 1 5 wks )   Additional Treatment Session   Start Time 0850   End Time 0915   Treatment Assessment Treatment focused on self care, functional mobility, stand balance, and activity tolerance  BP seated 130/58 HR 92  SPO2 97%  Sit to stand from chair with (S) and increased time  Mobility into bathroom with (S) using RW; (D) butt management  SPT to toilet with (S) using RW  Pt pulled down clothing with (S)  Stand to sit (S) with controlled descent  Performed BM hygiene seated with MI  Pulled up clothing with (S) and F unsupported stand balance  Washed hands, combed hair again, and brushed teeth at sink with MI in supervised environment for safety  Total stand time 5 mins  Completed functional mobility on unit with (S) using RW  F dynamic stand balance  Initially unsteady but no LOB  Returned to room with (S) using RW  SPO2 98% RA   Remains seated in chair with all needs and alarm intact  Oriented but decreased STM noted  Fair- activity tolerance  Educated pt on OT POC and role of therapy  Pleasant and motivated for wellness      Recommendation   OT Discharge Recommendation Home OT   Equipment Recommended (TBD)   OT - OK to Discharge No     Pt will achieve the following goals in 1 5 weeks    UB ADL- (I)   LB ADL- MI/I   Toileting- MI/I with hygiene and clothing management    Bed Mobility- MI/I with bed flat and no SR to prep for purposeful tasks   ADL Txfs- MI using most appropriate method for ADLs   Stand Balance- F+ dynamic & unsupported for clothing management    Stand Tolerance- 5-7 mins for showering   Tub/Shower- MI using most appropriate method; educate on AE   Meal Prep- MI with good safety    Home Management (laundry & bed making)- MI without LOB    Item Retrieval- MI with good safety; educate on AE PRN    Activity Tolerance- G- for ADLs   Pt will participate in [facility] assessment to determine level of safety for retuning home   Pt will achieve 4+/5 UE strength for ADL txfs     Derik Reyes, OT

## 2018-09-19 NOTE — PHYSICAL THERAPY NOTE
PHYSICAL THERAPY EVALUATION - [facility]    Time In: 14:20   Time Out: 14:35  Total Time: 15 min  MRN: [id number]    Patient is an 80 y o  male originally admitted to [facility] on 8/5/18  Patient transferred to [place] [address] (after acute rehab stay and readmission to [facility]) on 9/18/2018 with order in place for PT Evaluation and Treatment  Patient evaluated by PT today with admitting diagnosis of:  Endocarditis [I38] and with principal problem(s) of: Endocarditis of mitral valve; PT treatment diagnosis: Difficulty in walking R26 2  Patient Active Problem List   Diagnosis    Endocarditis of mitral valve    Status post non-ST elevation myocardial infarction (NSTEMI)    Candida esophagitis (HCC)    Chronic diastolic (congestive) heart failure (HCC)    Type 2 diabetes mellitus without complication, without long-term current use of insulin (HCC)    Essential hypertension    BPH (benign prostatic hyperplasia)    CKD (chronic kidney disease), stage III    Atrial fibrillation (HCC)    Anemia    Coronary artery disease involving native coronary artery       Please refer to H & P for further details  History reviewed  No pertinent past medical history  History reviewed  No pertinent surgical history  Comorbidities affecting patient's physical performance at time of assessment include: CAD, CHF, CKD, DM and HTN  Personal factors affecting the patient at time of IE include: stairs to enter home, decreased cognition, hearing impairments and visual impairments  Please find objective findings from PT assessment regarding body systems outlined below:   09/19/18 1420   Note Type   Note type Eval/Treat   Pain Assessment   Pain Assessment No/denies pain   Pain Score No Pain   Home Living   Type of Home House; Other (Comment)  (States 2 BRYANT with bilateral HRs)   Home Layout Two level; Able to live on main level with bedroom/bathroom;Stairs to enter with rails; Other (Comment)  (Bed and bathrooms are on 1st level)   Bathroom Shower/Tub Tub/shower unit   Bathroom Toilet Standard   Bathroom Accessibility Accessible   Home Equipment Cane   Additional Comments Patient states he was independent with ambulation without an assistive device   Prior Function   Level of [place] Independent with ADLs and functional mobility   Lives With Alone   Receives Help From Family; [address] in the last 6 months 0  (None; had 1 fall ~ 2 yrs ago)   Vocational Retired   [address]   Restrictions/Precautions   [facility] Precautions Per Order No   Braces or Orthoses Other (Comment)  (Bilateral lower leg compression hoses)   Other Precautions Cognitive; Fall Risk;Limb alert;Hard of hearing;Visual impairment  (Limb Alert Left UE, Bed alarm; Chair alarm; (+) urinary catheter)   General   Family/Caregiver Present Yes  (Son and patient's girlfriend)   Cognition   Overall Cognitive Status WFL   Arousal/Participation Alert   Orientation Level Oriented X4;Other (Comment)  (Confused occasionally in conversation)   Memory Decreased short term memory   Following Commands Follows all commands and directions without difficulty   RLE Assessment   RLE Assessment WFL  (Hip flex 4-/5, knee ext 4+/5, ankle DF 5/5)   LLE Assessment   LLE Assessment WFL  (Hip flex 4-/5, knee ext 4+/5, ankle DF 5/5)   Coordination   Movements are Fluid and Coordinated 1   Sensation WFL  (Denies numbness/tingling bilater lower legs/feet)   Bed Mobility   Rolling R 6  Modified independent   Additional items Bedrails   Rolling L 6  Modified independent   Additional items Bedrails   Supine to Sit 4  Minimal assistance  (Assist to trunk)   Additional items Assist x 1   Sit to Supine 5  Supervision  (For safety)   Transfers   Sit to Stand 5  Supervision  (For safety)   Additional items Verbal cues; Other  (VCs for proper hand placement)   Stand to Sit 5  Supervision  (For safety)   Additional items Verbal cues; Other  (VCs for proper hand placement)   Ambulation/Elevation   Gait pattern Decreased foot clearance;Decreased R stance; Forward Flexion  (Decreased gonsalo, forward head posture)   Gait Assistance 4  Minimal assist  (For balance)   Additional items Assist x 1   Assistive Device None   Distance 75 ft   Stair Management Assistance Not tested   Balance   Static Sitting Good   Static Standing Fair   Dynamic Standing Fair -   Endurance Deficit   Endurance Deficit Yes   Endurance Deficit Description Limited endurance for activity   Activity Tolerance   Activity Tolerance Patient tolerated treatment well   Assessment   Prognosis Fair   Problem List Decreased strength;Decreased endurance; Impaired balance;Decreased mobility; Decreased cognition; Impaired judgement;Decreased safety awareness; Impaired vision; Impaired hearing   Assessment In summary, guiding factors including patient history, examination of body system(s), clinical presentation and clinical decision making were considered  Patient presents with comorbid conditions that impact function, context of current functional limitations as compared to the prior level of function, lacking physical support, recent hospital admission and with living environment deficits  Patient also presents with edema bilateral lower legs, impaired cognition, safety awareness, bilateral hip flexor strength, bed mobility, transfers, endurance for activity, standing balance and gait abilities  Clinical presentation is unstable/unpredictable at this time  The assigned level of complexity is: high  Patient would benefit from continued PT treatment to address deficits as defined above and restore or maximize level of functional mobility with consistency in order to facilitate  return to prior/baseline level of function, return to home with BRYANT and increased independence in home alone environment     Barriers to Discharge Inaccessible home environment;Decreased caregiver support; Other (Comment)  (Lives alone)   Goals   Patient Goals "To get back to the way I was"  STG Expiration Date (STGs = LTGs)   Short Term Goal #1 STGs = LTGs   LTG Expiration Date 09/29/18   Long Term Goal #1 1  Patient will perform all bed mobility with modified independence in order to get in/out of bed  2  Patient will perform all functional transfers with modified independence in order to facilitate transfers from one surface to another  3  To improve bilateral hip flexion strength form 4-/5 to at least 4/5 to improve stability in gait  4  Patient will ambulate with modified independence x at least 200 ft with least restrictive assistive device in order to safely access all necessary areas of home and to enable walking within the home to complete activities of daily living  5  Patient will ascend/descend 2 steps with bilateral handrails with device prn with supervision to enter/exit home  Treatment Day 1   Plan   Treatment/Interventions ADL retraining;Functional transfer training;LE strengthening/ROM; Elevations; Therapeutic exercise; Endurance training;Cognitive reorientation;Patient/family training;Equipment eval/education; Bed mobility;Gait training;Spoke to nursing;OT;Family;Spoke to case management  (Speak to )   PT Frequency Other (Comment)  (6x/wk)   Recommendation   Equipment Recommended Other (Comment)  (To be determined)     PHYSICAL THERAPY TREATMENT NOTE    Time In: 14:35    Time Out: 15:00  Total Time: 25 min  MRN: [id number]    S: "I feel good!", patient stated while ambulating with RW     O: Therapeutic Activities:   Sit < > stand with supervision for safety; VCs for proper hand placement  Ambulation with RW with close supervision for safety x 100 ft, 150 ft, 110 ft x 2; ambulates with flexed trunk/forward head posture and decreased gonsalo    Ascended/descended 2 + 2 steps with bilateral HRs with no assistive device with min A of 1 for balance and secondary to weakness using step-to-step pattern; VCs for safe foot placement on each step  Noted shaky knees with descending  Vitals: Seated post-ambulation and left UE BP = 140/86, Heart Rate = 78 bpm, SpO2 = 94%  on RA     A: Improved stability in gait with use of RW vs no device, requiring decreased assistance  Increased ambulation distance  Patient Ox4, but confused in conversation with limited short term memory  P: To work on hip flexor strengthening/flexibility and stability in stance/gait  Patient returned to chair at bedside and chair alarm activated; patient positioned with all needs, including call bell, within reach      Sullivan Sheets Howie, PT, DPT

## 2018-09-19 NOTE — RESPIRATORY THERAPY NOTE
RT Protocol Note  Jose Martin Miller 80 y o  male MRN: [id number]  Unit/Bed#: -01 Encounter: [id number]    Assessment    Principal Problem:    Endocarditis of mitral valve  Active Problems:    Status post non-ST elevation myocardial infarction (NSTEMI)    Candida esophagitis (HCC)    Chronic diastolic (congestive) heart failure (HCC)    Type 2 diabetes mellitus without complication, without long-term current use of insulin (HCC)    Essential hypertension    BPH (benign prostatic hyperplasia)    CKD (chronic kidney disease), stage III    Atrial fibrillation (HCC)    Anemia    Coronary artery disease involving native coronary artery      Home Pulmonary Medications: NONE       History reviewed  No pertinent past medical history  Social History     Social History    Marital status: Unknown     Spouse name: N/A    Number of children: N/A    Years of education: N/A     Social History Main Topics    Smoking status: None    Smokeless tobacco: None    Alcohol use None    Drug use: Unknown    Sexual activity: Not Asked     Other Topics Concern    None     Social History Narrative    None       Subjective    Subjective Data: Routine assessment, Protocol  Objective    Physical Exam:   Assessment Type: Assess only  General Appearance: Alert, Awake  Respiratory Pattern: Normal  Chest Assessment: Chest expansion symmetrical  Bilateral Breath Sounds: Clear  Cough: None  O2 Device: RA    Vitals:  Blood pressure 105/50, pulse 86, temperature 98 4 °F (36 9 °C), temperature source Temporal, resp  rate 18, height 5' 11" (1 803 m), weight 70 kg (154 lb 5 2 oz), SpO2 97 %  Imaging and other studies: I have personally reviewed pertinent reports        O2 Device: RA     Plan    Respiratory Plan: Discontinue Protocol (Pt  has no Pulmonary history he stated  )        Resp Comments: Pt  seen per protocol, Pt ststed he has never used  any Respiratory medication outside the hospital  I believe he stated he used "Spiriva" for a couple days during a visit  No history of Asthma or COPD  stated he quit smoking more than 30 years ago  BS were Clear and he stated he was not SOB  Will switch the DR  order of Atrovent PRN to Spiriva PRN

## 2018-09-20 LAB
GLUCOSE SERPL-MCNC: 172 MG/DL (ref 70–99)
GLUCOSE SERPL-MCNC: 175 MG/DL (ref 70–99)
GLUCOSE SERPL-MCNC: 86 MG/DL (ref 70–99)
GLUCOSE SERPL-MCNC: 96 MG/DL (ref 70–99)

## 2018-09-20 PROCEDURE — 97530 THERAPEUTIC ACTIVITIES: CPT

## 2018-09-20 PROCEDURE — 97110 THERAPEUTIC EXERCISES: CPT

## 2018-09-20 PROCEDURE — 97116 GAIT TRAINING THERAPY: CPT

## 2018-09-20 PROCEDURE — 82948 REAGENT STRIP/BLOOD GLUCOSE: CPT

## 2018-09-20 PROCEDURE — 97535 SELF CARE MNGMENT TRAINING: CPT

## 2018-09-20 RX ORDER — TAMSULOSIN HYDROCHLORIDE 0.4 MG/1
0.4 CAPSULE ORAL
Status: DISCONTINUED | OUTPATIENT
Start: 2018-09-20 | End: 2018-10-04 | Stop reason: HOSPADM

## 2018-09-20 RX ADMIN — PANTOPRAZOLE SODIUM 40 MG: 40 TABLET, DELAYED RELEASE ORAL at 05:35

## 2018-09-20 RX ADMIN — SITAGLIPTIN 100 MG: 100 TABLET, FILM COATED ORAL at 08:24

## 2018-09-20 RX ADMIN — INSULIN LISPRO 1 UNITS: 100 INJECTION, SOLUTION INTRAVENOUS; SUBCUTANEOUS at 12:09

## 2018-09-20 RX ADMIN — GLIPIZIDE 5 MG: 5 TABLET ORAL at 17:22

## 2018-09-20 RX ADMIN — FUROSEMIDE 20 MG: 20 TABLET ORAL at 08:24

## 2018-09-20 RX ADMIN — FLUTICASONE PROPIONATE 1 SPRAY: 50 SPRAY, METERED NASAL at 09:44

## 2018-09-20 RX ADMIN — METOPROLOL SUCCINATE 50 MG: 50 TABLET, EXTENDED RELEASE ORAL at 08:24

## 2018-09-20 RX ADMIN — SENNOSIDES 8.6 MG: 8.6 TABLET, FILM COATED ORAL at 08:24

## 2018-09-20 RX ADMIN — POTASSIUM CHLORIDE 20 MEQ: 20 TABLET, EXTENDED RELEASE ORAL at 17:22

## 2018-09-20 RX ADMIN — TAMSULOSIN HYDROCHLORIDE 0.4 MG: 0.4 CAPSULE ORAL at 17:22

## 2018-09-20 RX ADMIN — GLIPIZIDE 5 MG: 5 TABLET ORAL at 06:25

## 2018-09-20 RX ADMIN — FINASTERIDE 5 MG: 5 TABLET, FILM COATED ORAL at 08:25

## 2018-09-20 RX ADMIN — ATORVASTATIN CALCIUM 40 MG: 40 TABLET, FILM COATED ORAL at 17:22

## 2018-09-20 RX ADMIN — LOSARTAN POTASSIUM 100 MG: 50 TABLET, FILM COATED ORAL at 08:24

## 2018-09-20 RX ADMIN — DOCUSATE SODIUM 100 MG: 100 CAPSULE, LIQUID FILLED ORAL at 08:24

## 2018-09-20 RX ADMIN — AMPICILLIN SODIUM 2000 MG: 2 INJECTION, POWDER, FOR SOLUTION INTRAMUSCULAR; INTRAVENOUS at 08:20

## 2018-09-20 RX ADMIN — VITAMIN D, TAB 1000IU (100/BT) 1000 UNITS: 25 TAB at 08:24

## 2018-09-20 RX ADMIN — CEFTRIAXONE 2000 MG: 2 INJECTION, SOLUTION INTRAVENOUS at 19:51

## 2018-09-20 RX ADMIN — AMPICILLIN SODIUM 2000 MG: 2 INJECTION, POWDER, FOR SOLUTION INTRAMUSCULAR; INTRAVENOUS at 02:31

## 2018-09-20 RX ADMIN — Medication 1 TABLET: at 08:24

## 2018-09-20 RX ADMIN — INSULIN LISPRO 1 UNITS: 100 INJECTION, SOLUTION INTRAVENOUS; SUBCUTANEOUS at 21:17

## 2018-09-20 RX ADMIN — AMPICILLIN SODIUM 2000 MG: 2 INJECTION, POWDER, FOR SOLUTION INTRAMUSCULAR; INTRAVENOUS at 14:35

## 2018-09-20 RX ADMIN — CEFTRIAXONE 2000 MG: 2 INJECTION, SOLUTION INTRAVENOUS at 09:44

## 2018-09-20 RX ADMIN — AMPICILLIN SODIUM 2000 MG: 2 INJECTION, POWDER, FOR SOLUTION INTRAMUSCULAR; INTRAVENOUS at 20:30

## 2018-09-20 RX ADMIN — POTASSIUM CHLORIDE 20 MEQ: 20 TABLET, EXTENDED RELEASE ORAL at 08:24

## 2018-09-20 RX ADMIN — DOCUSATE SODIUM 100 MG: 100 CAPSULE, LIQUID FILLED ORAL at 17:22

## 2018-09-20 NOTE — SOCIAL WORK
Patient is on IV ABX until 9/26  SARA received phonecall from [facility] 28 (P) [phone] Adriel Lloyd  SARA left

## 2018-09-20 NOTE — PROGRESS NOTES
RECREATIONAL THERAPY PARTICIPATION LOG      ACTIVITY:    GAMES: Word Game  BINGO:        MUSIC STIM:        ARTS & CRAFTS:        EXERCISE:        CLUBS & MEETING:        SOCIALS: Declined "Lunch [place]" today  SPIRITUAL: Visit to the [facility] for silent prayer  INDEPENDENT:        1:1:    Resident was seen for a 1:1 visit by Recreational Therapy Staff member  Resident played a word game, similar to [employer]" He enjoyed such topics as Presidents and Sports Teams  Resident seemed confused, but very eager to understand this word game  JAKE Sharp

## 2018-09-20 NOTE — PLAN OF CARE
Problem: PHYSICAL THERAPY ADULT  Goal: Performs mobility at highest level of function for planned discharge setting  See evaluation for individualized goals  Treatment/Interventions: ADL retraining, Functional transfer training, LE strengthening/ROM, Elevations, Therapeutic exercise, Endurance training, Cognitive reorientation, Patient/family training, Equipment eval/education, Bed mobility, Gait training, Spoke to nursing, OT, Family, Spoke to case management (Speak to )  Equipment Recommended: Other (Comment) (To be determined)       See flowsheet documentation for full assessment, interventions and recommendations  Outcome: Progressing  Prognosis: Fair  Problem List: Decreased strength, Decreased endurance, Impaired balance  Assessment: Pt very unsteady with no AD - reaching for walls and furniture - much better with RW with pt agreeable to use  Weakness in R LE WB for  alternating step taps and side stepping  /58 HR 79 at rest then 163/65 HR 80 after activity  Pt with no c/o pain or dizziness  Barriers to Discharge: Inaccessible home environment, Decreased caregiver support, Other (Comment) (Lives alone)                See flowsheet documentation for full assessment

## 2018-09-20 NOTE — NURSING NOTE
Alert and oriented x 3 denied pain/ discomfort  On bed alarm , no attempt getting up , butt catheter to leg bag patent for clear urine , tolerated iv antibiotic well   Will monitor closely

## 2018-09-20 NOTE — OCCUPATIONAL THERAPY NOTE
Occupational Therapy Treatment Note    Name:  Trina Jesus   MRN:   [id number]  Age:     80 y o  Patient Active Problem List   Diagnosis    Endocarditis of mitral valve    Status post non-ST elevation myocardial infarction (NSTEMI)    Candida esophagitis (HCC)    Chronic diastolic (congestive) heart failure (HCC)    Type 2 diabetes mellitus without complication, without long-term current use of insulin (HCC)    Essential hypertension    BPH (benign prostatic hyperplasia)    CKD (chronic kidney disease), stage III    Atrial fibrillation (HCC)    Anemia    Coronary artery disease involving native coronary artery     Endocarditis [I38]      Subjective/Goals: " I want to get back to walking and driving"    MCCVGR:381/01 HR 83 RA O2 96% beginning of tx, 136/56 HR 88 RAO2 97 %    OT total treatment time:50 min    Additional goals & Comments: Pt motivated for wellness, cooperative needed v/c's to move through tasks at times  09/20/18 0810   Restrictions/Precautions   Weight Bearing Precautions Per Order No   Other Precautions Cognitive; Chair Alarm; Bed Alarm   Lifestyle   Reciprocal Relationships Family   Pain Assessment   Pain Assessment No/denies pain   ADL   Where Assessed Chair   Grooming Assistance 5  Supervision/Setup   Grooming Deficit (sinkside Oral and hair care, hand washing)   UB Bathing Assistance 5  Supervision/Setup   LB Bathing Deficit Supervision/safety   LB Bathing Comments (occassional v/c to move through task)   UB Dressing Assistance 7  Independent   LB Dressing Assistance 5  Supervision/Setup   Toileting Comments ([place])   Bed Mobility   Rolling L 6  Modified independent   Supine to Sit 6  Modified independent   Additional items Bedrails   Additional Comments (HOB flat)   Transfers   Sit to Stand 5  Supervision   Additional items Armrests   Stand to Sit 5  Supervision   Additional items Armrests   Stand pivot 5  Supervision   Additional items (RW)   Additional Comments F D, F Static Functional Mobility   Functional Mobility 5  Supervision   Additional Comments (using RW)   Cognition   Overall Cognitive Status WFL   Arousal/Participation Alert; Cooperative   Attention Within functional limits   Orientation Level Oriented to person;Oriented to place;Oriented to time;Oriented to situation   Memory Decreased short term memory   Following Commands Follows all commands and directions without difficulty   Comments (v/c's to move through task at times)   Activity Tolerance   Activity Tolerance Patient tolerated treatment well   Assessment   Assessment Patient participated in Skilled OT session this date with interventions consisting of ADL re training with the use of correct body mechnaics, Energy Conservation techniques, safety awareness and fall prevention techniques,  therapeutic activities to: increase activity tolerance, increase standing tolerance time with unilateral UE support to complete sink level ADLs and increase dynamic sit/ stand balance during functional activity    Patient agreeable to OT treatment session, upon arrival patient was found supine in bed  In comparison to previous session, patient pleasant and motivated   Patient requiring ocassional safety reminders  Patient continues to be functioning below baseline level, occupational performance remains limited secondary to factors listed above and increased risk for falls and injury  From OT standpoint, recommendation at time of d/c would be 24 hour supervision/ assist and Home OT  Patient to benefit from continued Occupational Therapy treatment while on TCF to address deficits as defined above and maximize level of functional independence with ADLs and functional mobility      Plan   Goal Expiration Date 09/28/18   Treatment Day 2   OT Frequency (6x/wk)   Recommendation   OT Discharge Recommendation Home OT   Equipment Recommended (and 24/7 S)   OT - OK to Discharge Lillie Jara, [address]

## 2018-09-20 NOTE — SOCIAL WORK
SARA contacted patient's daughter Reanna Izquierdo to schedule a   Reanna Izquierdo stated that she has to check her schedule and will contact SARA back  SW provided with contact information

## 2018-09-20 NOTE — PLAN OF CARE
Problem: OCCUPATIONAL THERAPY ADULT  Goal: Performs self-care activities at highest level of function for planned discharge setting  See evaluation for individualized goals  Treatment Interventions: ADL retraining, Functional transfer training, UE strengthening/ROM, Endurance training, Patient/family training, Cognitive reorientation, Equipment evaluation/education, Neuromuscular reeducation, Activityengagement  Equipment Recommended:  (TBD)       See flowsheet documentation for full assessment, interventions and recommendations  Outcome: Progressing  Limitation: Decreased ADL status, Decreased UE strength, Decreased cognition, Decreased endurance, Decreased self-care trans, Decreased high-level ADLs  Prognosis: Good  Assessment: Patient participated in Skilled OT session this date with interventions consisting of ADL re training with the use of correct body mechnaics, Energy Conservation techniques, safety awareness and fall prevention techniques,  therapeutic activities to: increase activity tolerance, increase standing tolerance time with unilateral UE support to complete sink level ADLs and increase dynamic sit/ stand balance during functional activity    Patient agreeable to OT treatment session, upon arrival patient was found supine in bed  In comparison to previous session, patient pleasant and motivated   Patient requiring ocassional safety reminders  Patient continues to be functioning below baseline level, occupational performance remains limited secondary to factors listed above and increased risk for falls and injury  From OT standpoint, recommendation at time of d/c would be 24 hour supervision/ assist and Home OT  Patient to benefit from continued Occupational Therapy treatment while on TCF to address deficits as defined above and maximize level of functional independence with ADLs and functional mobility        OT Discharge Recommendation: Home OT  OT - OK to Discharge: No      Comments: Carter Beasley

## 2018-09-20 NOTE — SOCIAL WORK
SW met with patient to review admission packet and signed consents  Patient signed all consents and is agreeable to use [facility]  Patient reports his POA is Raheel Heck, his daughter and is agreeable to SW contacting either of his daughters listed and to schedule [facility]  SW reviewed psychosocial assessment  Patient is a 80-year old male admitted  S/p NSTEME and CHF diastolic type  Prior to admission, patient lived alone in a [address] [place] [address] with first floor set up and [address]  Patient is retired and independent with ADLs  Prior to admission patient still drove  Patient reports he does not own any DME  Patient denies tx for mental health and denies alcohol use and reports he quit smoking at age 61  Patient was a  and served during the [place] [employer] in the [employer]  Patient confirmed his pharmacy is [facility] in [place], [place]  No further questions/concerns at this time

## 2018-09-20 NOTE — NURSING NOTE
Iv antibiotics via picc line infusing well  No c/o chest pain or  Respiratory distress / gait steady with walker  And therapy   No c/o pain

## 2018-09-20 NOTE — PROGRESS NOTES
Medication Regimen Review (MRR)    To promote positive health outcomes and reduce adverse consequences the patient's medication therapy has been reviewed by a pharmacist for the following potential problems:   1   documented indication and therapeutic benefits  2   appropriate dose, frequency, route, and duration of therapy  3   medication interactions, side effects, and allergies  4   medication or transcription errors  Medications are also reviewed for appropriate monitoring, duplicate therapy, and dose reduction  Based on the review please see the following recommendations  Patient information:    The patient is 80 y o  admitted for IV abx (ceftriaxone and ampicillin) through 9/26/18 for enterococcus endocarditis of mitral valve  Wt Readings from Last 1 Encounters:   09/20/18 68 1 kg (150 lb 2 1 oz)       Lab Results   Component Value Date    CALCIUM 8 8 09/19/2018     (L) 09/19/2018    K 4 9 09/19/2018    CO2 33 (H) 09/19/2018    CL 99 09/19/2018    BUN 35 (H) 09/19/2018    CREATININE 1 10 09/19/2018       Patient is taking the following medications that need review:    Recommendations:  1  Recommend starting po Florastor 250 mg BID while on therapy with IV antibiotics  2   Recommend TCF bowel protocol as needed

## 2018-09-20 NOTE — PHYSICAL THERAPY NOTE
62' session     09/20/18 1416   Pain Assessment   Pain Assessment No/denies pain   General   Chart Reviewed Yes   Family/Caregiver Present No   Cognition   Overall Cognitive Status WFL   Attention Within functional limits   Following Commands Follows all commands and directions without difficulty   Subjective   Subjective i know my balance is off   Bed Mobility   Supine to Sit 6  Modified independent   Sit to Supine 6  Modified independent   Additional Comments bed flat and no rail   Transfers   Sit to Stand 5  Supervision   Stand to Sit 5  Supervision   Stand pivot (min A  wiht no AD and S with RW)   Ambulation/Elevation   Gait pattern Antalgic;Decreased L stance;Decreased foot clearance   Gait Assistance (min A wit hno AD and close S with RW)   Assistive Device (none then RW)   Distance (75' adn 25' wiht no AD then 80' with RW)   Stair Management Assistance (close S )   Stair Management Technique One rail R;Step to pattern  (2 hands on one rail)   Number of Stairs 2   Balance   Ambulatory Fair  (with RW  P+ with no AD)   Endurance Deficit   Endurance Deficit Yes   Endurance Deficit Description breaks neede for fatigue   Activity Tolerance   Activity Tolerance Patient tolerated treatment well   Exercises   Knee AROM Long Arc Quad Sitting;AROM  (x 30 )   Ankle Pumps (x 30)   Neuro re-ed (restorator  x 7')   Balance training  alternating step taps - 10 reps with one UE adn S then 10  wiht no UE support and min A of 1, sidesteping  and retro amb with min A of 1   Assessment   Prognosis Fair   Problem List Decreased strength;Decreased endurance; Impaired balance   Assessment Pt very unsteady with no AD - reaching for walls and furniture - much better with RW with pt agreeable to use  Weakness in R LE WB for  alternating step taps and side stepping  /58 HR 79 at rest then 163/65 HR 80 after activity  Pt with no c/o pain or dizziness      Goals   Patient Goals i want o move right along an dget back to walking and driving   STG Expiration Date 09/29/18   LTG Expiration Date 09/29/18   Treatment Day 2   Plan   Treatment/Interventions (cont as per pOC)   Progress Progressing toward goals   PT Frequency (6x/[place])

## 2018-09-21 LAB
GLUCOSE SERPL-MCNC: 115 MG/DL (ref 70–99)
GLUCOSE SERPL-MCNC: 153 MG/DL (ref 70–99)
GLUCOSE SERPL-MCNC: 177 MG/DL (ref 70–99)
GLUCOSE SERPL-MCNC: 84 MG/DL (ref 70–99)

## 2018-09-21 PROCEDURE — 97110 THERAPEUTIC EXERCISES: CPT

## 2018-09-21 PROCEDURE — 82948 REAGENT STRIP/BLOOD GLUCOSE: CPT

## 2018-09-21 PROCEDURE — 97530 THERAPEUTIC ACTIVITIES: CPT

## 2018-09-21 PROCEDURE — 97116 GAIT TRAINING THERAPY: CPT

## 2018-09-21 RX ADMIN — INSULIN LISPRO 1 UNITS: 100 INJECTION, SOLUTION INTRAVENOUS; SUBCUTANEOUS at 12:22

## 2018-09-21 RX ADMIN — FUROSEMIDE 20 MG: 20 TABLET ORAL at 08:28

## 2018-09-21 RX ADMIN — AMPICILLIN SODIUM 2000 MG: 2 INJECTION, POWDER, FOR SOLUTION INTRAMUSCULAR; INTRAVENOUS at 16:13

## 2018-09-21 RX ADMIN — POTASSIUM CHLORIDE 20 MEQ: 20 TABLET, EXTENDED RELEASE ORAL at 17:25

## 2018-09-21 RX ADMIN — AMPICILLIN SODIUM 2000 MG: 2 INJECTION, POWDER, FOR SOLUTION INTRAMUSCULAR; INTRAVENOUS at 20:47

## 2018-09-21 RX ADMIN — AMPICILLIN SODIUM 2000 MG: 2 INJECTION, POWDER, FOR SOLUTION INTRAMUSCULAR; INTRAVENOUS at 09:30

## 2018-09-21 RX ADMIN — FINASTERIDE 5 MG: 5 TABLET, FILM COATED ORAL at 08:28

## 2018-09-21 RX ADMIN — INSULIN LISPRO 1 UNITS: 100 INJECTION, SOLUTION INTRAVENOUS; SUBCUTANEOUS at 21:25

## 2018-09-21 RX ADMIN — GLIPIZIDE 5 MG: 5 TABLET ORAL at 06:12

## 2018-09-21 RX ADMIN — CEFTRIAXONE 2000 MG: 2 INJECTION, SOLUTION INTRAVENOUS at 21:25

## 2018-09-21 RX ADMIN — GLIPIZIDE 5 MG: 5 TABLET ORAL at 17:26

## 2018-09-21 RX ADMIN — DOCUSATE SODIUM 100 MG: 100 CAPSULE, LIQUID FILLED ORAL at 17:40

## 2018-09-21 RX ADMIN — METOPROLOL SUCCINATE 50 MG: 50 TABLET, EXTENDED RELEASE ORAL at 08:27

## 2018-09-21 RX ADMIN — SITAGLIPTIN 100 MG: 100 TABLET, FILM COATED ORAL at 08:27

## 2018-09-21 RX ADMIN — FLUTICASONE PROPIONATE 1 SPRAY: 50 SPRAY, METERED NASAL at 08:31

## 2018-09-21 RX ADMIN — AMPICILLIN SODIUM 2000 MG: 2 INJECTION, POWDER, FOR SOLUTION INTRAMUSCULAR; INTRAVENOUS at 02:14

## 2018-09-21 RX ADMIN — DOCUSATE SODIUM 100 MG: 100 CAPSULE, LIQUID FILLED ORAL at 08:30

## 2018-09-21 RX ADMIN — VITAMIN D, TAB 1000IU (100/BT) 1000 UNITS: 25 TAB at 08:27

## 2018-09-21 RX ADMIN — ATORVASTATIN CALCIUM 40 MG: 40 TABLET, FILM COATED ORAL at 16:15

## 2018-09-21 RX ADMIN — Medication 1 TABLET: at 08:28

## 2018-09-21 RX ADMIN — LOSARTAN POTASSIUM 100 MG: 50 TABLET, FILM COATED ORAL at 08:27

## 2018-09-21 RX ADMIN — PANTOPRAZOLE SODIUM 40 MG: 40 TABLET, DELAYED RELEASE ORAL at 06:11

## 2018-09-21 RX ADMIN — TAMSULOSIN HYDROCHLORIDE 0.4 MG: 0.4 CAPSULE ORAL at 17:25

## 2018-09-21 RX ADMIN — POTASSIUM CHLORIDE 20 MEQ: 20 TABLET, EXTENDED RELEASE ORAL at 08:28

## 2018-09-21 RX ADMIN — SENNOSIDES 8.6 MG: 8.6 TABLET, FILM COATED ORAL at 08:28

## 2018-09-21 RX ADMIN — CEFTRIAXONE 2000 MG: 2 INJECTION, SOLUTION INTRAVENOUS at 08:23

## 2018-09-21 NOTE — PHYSICAL THERAPY NOTE
39 minute treatment       09/21/18 1326   Pain Assessment   Pain Assessment No/denies pain   Pain Score No Pain   Restrictions/Precautions   Weight Bearing Precautions Per Order No   General   Chart Reviewed Yes   Response to Previous Treatment Patient with no complaints from previous session  Family/Caregiver Present No   Cognition   Overall Cognitive Status WFL   Following Commands Follows all commands and directions without difficulty   Transfers   Sit to Stand 5  Supervision   Additional items Armrests; Increased time required   Stand to Sit 5  Supervision   Additional items Armrests   Stand pivot 5  Supervision   Additional items (RW support)   Ambulation/Elevation   Gait pattern Decreased foot clearance; Improper Weight shift   Gait Assistance 5  Supervision  (CS w/ RW;   min A x 1 w/o AD)   Assistive Device Rolling walker   Distance 150' w/ RW; 79' w/o AD   Stair Management Technique One rail R;Step to pattern   Number of Stairs 2  (R rail up and down 2 steps w/ CG)   Activity Tolerance   Activity Tolerance Patient tolerated treatment well   Exercises   Hip Abduction Standing;20 reps  (standing at parallel bars)   Hip Extension Standing;20 reps   Hip Adduction (alternating at parallel bars)   Ankle Pumps Standing;20 reps  (heel raises w/o UE support)   Marching Standing;20 reps  (w/o UE support, but w/ CG)   Assessment   Prognosis Fair   Assessment Pt is doing well in PT  Gait is steady w/ RW, but pt occ allowed RW to get too far in front of him  Gait is unsteady w/o AD  Pt was able to perform standing heel raises and marching in place w/o UE support , but did have CG/CS  /55      Goals   Patient Goals "to get back to driving a car and walking"   STG Expiration Date 09/29/18   LTG Expiration Date 09/29/18   Treatment Day 3   Plan   Treatment/Interventions (Continue per plan of care)   Progress Progressing toward goals   PT Frequency (6x/week)   Elfreda Dopp, PTA

## 2018-09-21 NOTE — PLAN OF CARE
Problem: PHYSICAL THERAPY ADULT  Goal: Performs mobility at highest level of function for planned discharge setting  See evaluation for individualized goals  Treatment/Interventions: ADL retraining, Functional transfer training, LE strengthening/ROM, Elevations, Therapeutic exercise, Endurance training, Cognitive reorientation, Patient/family training, Equipment eval/education, Bed mobility, Gait training, Spoke to nursing, OT, Family, Spoke to case management (Speak to )  Equipment Recommended: Other (Comment) (To be determined)       See flowsheet documentation for full assessment, interventions and recommendations  Outcome: Progressing  Prognosis: Fair  Problem List: Decreased strength, Decreased endurance, Impaired balance  Assessment: Pt is doing well in PT  Gait is steady w/ RW, but pt occ allowed RW to get too far in front of him  Gait is unsteady w/o AD  Pt was able to perform standing heel raises and marching in place w/o UE support , but did have CG/CS  /55  Barriers to Discharge: Inaccessible home environment, Decreased caregiver support, Other (Comment) (Lives alone)                See flowsheet documentation for full assessment

## 2018-09-21 NOTE — CASE MANAGEMENT
Continued Stay Review    Date: 9/21 AUTH# [id number]    Vital Signs: /64 (BP Location: Right arm)   Pulse 80   Temp 97 8 °F (36 6 °C) (Temporal)   Resp 16   Ht 5' 11" (1 803 m)   Wt 68 1 kg (150 lb 2 1 oz)   SpO2 97%   BMI 20 94 kg/m²     Medications:   Scheduled Meds:   Current Facility-Administered Medications:  acetaminophen 650 mg Oral Q6H PRN Luz Monroy MD    ampicillin 2,000 mg Intravenous Q6H Luz Monroy MD Last Rate: 2,000 mg (09/21/18 0214)   atorvastatin 40 mg Oral Daily With Rito Landrum MD    benzonatate 200 mg Oral TID PRN Luz Monroy MD    cefTRIAXone 2,000 mg Intravenous Q12H Luz Monroy MD Last Rate: 2,000 mg (09/20/18 1951)   cholecalciferol 1,000 Units Oral Daily Luz Monroy MD    docusate sodium 100 mg Oral BID Luz Monroy MD    finasteride 5 mg Oral Daily Luz Monroy MD    fluticasone 1 spray Nasal Daily Luz Monroy MD    furosemide 20 mg Oral Daily Luz Monroy MD    glipiZIDE 5 mg Oral BID AC Luz Monroy MD    guaiFENesin 200 mg Oral TID PRN Luz Monroy MD    insulin lispro 1-5 Units Subcutaneous TID AC Luz Monroy MD    insulin lispro 1-5 Units Subcutaneous HS Luz Monroy MD    losartan 100 mg Oral Daily Luz Monroy MD    metoprolol succinate 50 mg Oral Daily Luz Monroy MD    multivitamin-minerals 1 tablet Oral Daily Luz Monroy MD    nitroglycerin 0 4 mg Sublingual Q5 Min PRN Luz Monroy MD    ondansetron 4 mg Intravenous Q6H PRN Luz Monroy MD    pantoprazole 40 mg Oral Early Morning Luz Monroy MD    potassium chloride 20 mEq Oral BID Luz Monroy MD    senna 1 tablet Oral Daily Luz Monroy MD    sitaGLIPtin 100 mg Oral Daily Luz Monroy MD    tamsulosin 0 4 mg Oral Daily With Dinner DREAD Cervantes    tiotropium 18 mcg Inhalation Daily PRN Dax Fonseca MD    tuberculin 5 Units Intradermal PRN Dax Fonseca MD PRN Meds: none used     Labs/Diagnostic Results:  Hgb A1C- 6 6    Age/Sex: 80 y o  male     Assessment/Plan:  The patient was transferred from [facility] to [facility] complete a course of ampicillin and ceftriaxone for E faecalis endocarditis  Positive blood cultures on August 2nd and Aug 9, 2018   repeat blood culture on Aug 11, 2018 on negative  Will continue ampicillin 2 g every 6 hours and ceftriaxone 2 g every 12 hours until Sep 26, 2018  Lt UE PICC line in place, no signs of infection, nursing care to prevent line associated infection Continue rehab PT OT evaluation and treatment  Had recent MI while in acute care secondary to acute anemia  Did require transfusions at that time  Current Hgb  Monitored and is 9 5  Simmons removed this morning  On flomax for hx of BPH and retention  Will monitor for adequate urinary output  PT GOALS:         Goals   Patient Goals "To get back to the way I was"  STG Expiration Date (STGs = LTGs)   Short Term Goal #1 STGs = LTGs   LTG Expiration Date 09/29/18   Long Term Goal #1 1  Patient will perform all bed mobility with modified independence in order to get in/out of bed  2  Patient will perform all functional transfers with modified independence in order to facilitate transfers from one surface to another  3  To improve bilateral hip flexion strength form 4-/5 to at least 4/5 to improve stability in gait  4  Patient will ambulate with modified independence x at least 200 ft with least restrictive assistive device in order to safely access all necessary areas of home and to enable walking within the home to complete activities of daily living  5  Patient will ascend/descend 2 steps with bilateral handrails with device prn with supervision to enter/exit home  Treatment Day 1   Plan   Treatment/Interventions ADL retraining;Functional transfer training;LE strengthening/ROM; Elevations; Therapeutic exercise; Endurance training;Cognitive reorientation;Patient/family training;Equipment eval/education; Bed mobility;Gait training;Spoke to nursing;OT;Family;Spoke to case management  (Speak to )   PT Frequency Other (Comment)  (6x/wk)   Recommendation   Equipment Recommended Other (Comment)  (To be determined)     PT NOTES 9/20 :         Jennifer Garcia PTA Physical Therapist Assistant Signed   Physical Therapy Note Date of Service: 9/20/2018  2:38 PM         []Hide copied text  []Hover for attribution information               62' session       09/20/18 1416   Pain Assessment   Pain Assessment No/denies pain   General   Chart Reviewed Yes   Family/Caregiver Present No   Cognition   Overall Cognitive Status WFL   Attention Within functional limits   Following Commands Follows all commands and directions without difficulty   Subjective   Subjective i know my balance is off   Bed Mobility   Supine to Sit 6  Modified independent   Sit to Supine 6  Modified independent   Additional Comments bed flat and no rail   Transfers   Sit to Stand 5  Supervision   Stand to Sit 5  Supervision   Stand pivot (min A  wiht no AD and S with RW)   Ambulation/Elevation   Gait pattern Antalgic;Decreased L stance;Decreased foot clearance   Gait Assistance (min A wit hno AD and close S with RW)   Assistive Device (none then RW)   Distance (75' adn 25' wiht no AD then 80' with RW)   Stair Management Assistance (close S )   Stair Management Technique One rail R;Step to pattern  (2 hands on one rail)   Number of Stairs 2   Balance   Ambulatory Fair  (with RW  P+ with no AD)   Endurance Deficit   Endurance Deficit Yes   Endurance Deficit Description breaks neede for fatigue   Activity Tolerance   Activity Tolerance Patient tolerated treatment well   Exercises   Knee AROM Long Arc Quad Sitting;AROM  (x 30 )   Ankle Pumps (x 30)   Neuro re-ed (restorator  x 7')   Balance training  alternating step taps - 10 reps with one UE adn S then 10  wiht no UE support and min A of 1, sidesteping  and retro amb with min A of 1   Assessment   Prognosis Fair   Problem List Decreased strength;Decreased endurance; Impaired balance   Assessment Pt very unsteady with no AD - reaching for walls and furniture - much better with RW with pt agreeable to use  Weakness in R LE WB for  alternating step taps and side stepping  /58 HR 79 at rest then 163/65 HR 80 after activity  Pt with no c/o pain or dizziness  Goals   Patient Goals i want o move right along an dget back to walking and driving   STG Expiration Date 09/29/18   LTG Expiration Date 09/29/18   Treatment Day 2   Plan   Treatment/Interventions (cont as per Ritesh 41)   Progress Progressing toward goals   PT Frequency (6x/[place])            OT GOALS:       Pt will achieve the following goals in 1 5 weeks    UB ADL- (I)   LB ADL- MI/I   Toileting- MI/I with hygiene and clothing management    Bed Mobility- MI/I with bed flat and no SR to prep for purposeful tasks   ADL Txfs- MI using most appropriate method for ADLs   Stand Balance- F+ dynamic & unsupported for clothing management    Stand Tolerance- 5-7 mins for showering   Tub/Shower- MI using most appropriate method; educate on AE   Meal Prep- MI with good safety    Home Management (laundry & bed making)- MI without LOB    Item Retrieval- MI with good safety; educate on AE PRN    Activity Tolerance- G- for ADLs   Pt will participate in [address] [place] [address] assessment to determine level of safety for retuning home   Pt will achieve 4+/5 UE strength for ADL txfs        OT NOTES  9/20:    LONI Massey Occupational Therapy Assistant Signed   Occupational Therapy Note Date of Service: 9/20/2018  8:10 AM         []Hide copied text  []Mike for attribution information     Occupational Therapy Treatment Note     Name:  Kavita Jackson   MRN:   [id number]  Age:     80 y o        Patient Active Problem List   Diagnosis    Endocarditis of mitral valve    Status post non-ST elevation myocardial infarction (NSTEMI)    Candida esophagitis ([facility] [address] )  Chronic diastolic (congestive) heart failure (HCC)    Type 2 diabetes mellitus without complication, without long-term current use of insulin (HCC)    Essential hypertension    BPH (benign prostatic hyperplasia)    CKD (chronic kidney disease), stage III    Atrial fibrillation (HCC)    Anemia    Coronary artery disease involving native coronary artery      Endocarditis [I38]        Subjective/Goals: " I want to get back to walking and driving"     NUCMFD:648/80 HR 83 RA O2 96% beginning of tx, 136/56 HR 88 RAO2 97 %     OT total treatment time:50 min     Additional goals & Comments: Pt motivated for wellness, cooperative needed v/c's to move through tasks at times        09/20/18 0810   Restrictions/Precautions   Weight Bearing Precautions Per Order No   Other Precautions Cognitive; Chair Alarm; Bed Alarm   Lifestyle   Reciprocal Relationships Family   Pain Assessment   Pain Assessment No/denies pain   ADL   Where Assessed Chair   Grooming Assistance 5  Supervision/Setup   Grooming Deficit (sinkside Oral and hair care, hand washing)   UB Bathing Assistance 5  Supervision/Setup   LB Bathing Deficit Supervision/safety   LB Bathing Comments (occassional v/c to move through task)   UB Dressing Assistance 7  Independent   LB Dressing Assistance 5  Supervision/Setup   Toileting Comments (Simmons)   Bed Mobility   Rolling L 6  Modified independent   Supine to Sit 6  Modified independent   Additional items Bedrails   Additional Comments (HOB flat)   Transfers   Sit to Stand 5  Supervision   Additional items Armrests   Stand to Sit 5  Supervision   Additional items Armrests   Stand pivot 5  Supervision   Additional items (RW)   Additional Comments F D, F Static   Functional Mobility   Functional Mobility 5  Supervision   Additional Comments (using RW)   Cognition   Overall Cognitive Status WFL   Arousal/Participation Alert; Cooperative   Attention Within functional limits   Orientation Level Oriented to person;Oriented to place;Oriented to time;Oriented to situation   Memory Decreased short term memory   Following Commands Follows all commands and directions without difficulty   Comments (v/c's to move through task at times)   Activity Tolerance   Activity Tolerance Patient tolerated treatment well   Assessment   Assessment Patient participated in Skilled OT session this date with interventions consisting of ADL re training with the use of correct body mechnaics, Energy Conservation techniques, safety awareness and fall prevention techniques,  therapeutic activities to: increase activity tolerance, increase standing tolerance time with unilateral UE support to complete sink level ADLs and increase dynamic sit/ stand balance during functional activity    Patient agreeable to OT treatment session, upon arrival patient was found supine in bed  In comparison to previous session, patient pleasant and motivated   Patient requiring ocassional safety reminders  Patient continues to be functioning below baseline level, occupational performance remains limited secondary to factors listed above and increased risk for falls and injury  From OT standpoint, recommendation at time of d/c would be 24 hour supervision/ assist and Home OT  Patient to benefit from continued Occupational Therapy treatment while on TCF to address deficits as defined above and maximize level of functional independence with ADLs and functional mobility  Plan   Goal Expiration Date 09/28/18   Treatment Day 2   OT Frequency (6x/wk)   Recommendation   OT Discharge Recommendation Home OT   Equipment Recommended (and 24/7 S)   OT - OK to Discharge Lillie Gonzales IVEY                          Discharge Plan:     Patient signed all consents and is agreeable to use [facility]  Patient reports his POA is Dionisio Simpson, his daughter and is agreeable to SW contacting either of his daughters listed and to schedule [facility] with date to be determined   Will at that time discuss further discharge plans  Patient is on IV ABX until 9/26       [address] [place] [address]  [phone]

## 2018-09-21 NOTE — PLAN OF CARE
Problem: OCCUPATIONAL THERAPY ADULT  Goal: Performs self-care activities at highest level of function for planned discharge setting  See evaluation for individualized goals  Treatment Interventions: ADL retraining, Functional transfer training, UE strengthening/ROM, Endurance training, Patient/family training, Cognitive reorientation, Equipment evaluation/education, Neuromuscular reeducation, Activityengagement  Equipment Recommended:  (TBD)       See flowsheet documentation for full assessment, interventions and recommendations  Outcome: Progressing  Limitation: Decreased ADL status, Decreased UE strength, Decreased cognition, Decreased endurance, Decreased self-care trans, Decreased high-level ADLs  Prognosis: Good  Assessment: Patient participated in Skilled OT session this date with interventions consisting of therapeutic exercise to: increase functional use of BUEs, increase BUE muscle strength ,  therapeutic activities to: increase activity tolerance, increase standing tolerance time with unilateral UE support to complete sink level ADLs and increase dynamic sit/ stand balance during functional activity    Patient agreeable to OT treatment session, upon arrival patient was found seated OOB to Chair  In comparison to previous session, patient with improvements in transfers   Patient requiring ocassional safety reminders  Patient continues to be functioning below baseline level, occupational performance remains limited secondary to factors listed above and increased risk for falls and injury  From OT standpoint, recommendation at time of d/c would be Home OT and 24 hour supervision/ assist   Patient to benefit from continued Occupational Therapy treatment while on TCF to address deficits as defined above and maximize level of functional independence with ADLs and functional mobility        OT Discharge Recommendation:  (Home OT/24/S unless confusion resolves)  OT - OK to Discharge: No      Comments: Melodie Goff

## 2018-09-21 NOTE — SOCIAL WORK
SARA received phonecall from Rony Somers at  Infectious disease  Litzy reports typically physican prefers to see patient prior to discharging IV ABX  SARA discussed arranging transport- Litzy reports that if it is easier to transfer patient to [facility]  SARA discussed with attending and patient both agreeable to consult [facility] next week vs transportation patient out of facility  SW to notify CRNP of plan next week  Litzy notified as well, reports that if there are any questions/concerns please contact -ID office

## 2018-09-21 NOTE — OCCUPATIONAL THERAPY NOTE
Occupational Therapy Treatment Note    Name:  Anthony Barbosa   MRN:   [id number]  Age:     80 y o  Patient Active Problem List   Diagnosis    Endocarditis of mitral valve    Status post non-ST elevation myocardial infarction (NSTEMI)    Candida esophagitis (HCC)    Chronic diastolic (congestive) heart failure (HCC)    Type 2 diabetes mellitus without complication, without long-term current use of insulin (HCC)    Essential hypertension    BPH (benign prostatic hyperplasia)    CKD (chronic kidney disease), stage III    Atrial fibrillation (HCC)    Anemia    Coronary artery disease involving native coronary artery     Endocarditis [I38]      Subjective/Goals: " I think I'm doing good already"    Vitals: Initially appeared low w/ sweatshirt sleeve on, once removed increase in BP  120/55 HR 80 seated, end of tx 102/58 HR 76 standing 111/53 HR 85 unable to obtain RA O2 2nd cold hands    OT total treatment time:60 min    Additional goals & Comments: Pt pleasant and cooperative, motivated for wellness  Trialed tub bench, recommend grab bars as well to increase safety  09/21/18 1532   Restrictions/Precautions   Weight Bearing Precautions Per Order No   Other Precautions Chair Alarm;Cognitive; Bed Alarm   Lifestyle   Reciprocal Relationships Family   Pain Assessment   Pain Assessment No/denies pain   ADL   Toileting Comments (Denied need)   Light Housekeeping   Light Housekeeping Level (S/ MI w/ RW basket for item retrieval G safety)   Functional Standing Tolerance   Time `5min   Activity (Dyn stand bal/func mob)   Transfers   Sit to Stand 6  Modified independent   Additional items Armrests   Stand to Sit 6  Modified independent   Additional items Armrests   Stand pivot 5  Supervision   Additional items (w/ RW)   Additional Comments F Dyn, F Static   Functional Mobility   Functional Mobility 5  Supervision   Additional items Rolling walker   Tub Transfers   Tub Transfers Supervision   Tub Transfers Comments (tub bench following edu, self assisted LE's)   Therapeutic Exercise - ROM   UE-ROM (5 min restorator)   Therapeutic Excerise-Strength   UE Strength (2# dowel 30 curls, 25 Abd/Add, 15 press)   Cognition   Overall Cognitive Status WFL   Arousal/Participation Alert; Cooperative   Attention Within functional limits   Orientation Level Oriented to person;Oriented to place;Oriented to time;Oriented to situation   Following Commands Follows all commands and directions without difficulty   Cognition Assessment Tools (element of confusion end of tx)   Additional Activities   Additional Activities (Dyn stand bal activity F bal)   Activity Tolerance   Activity Tolerance Patient tolerated treatment well   Assessment   Assessment Patient participated in Skilled OT session this date with interventions consisting of therapeutic exercise to: increase functional use of BUEs, increase BUE muscle strength ,  therapeutic activities to: increase activity tolerance, increase standing tolerance time with unilateral UE support to complete sink level ADLs and increase dynamic sit/ stand balance during functional activity    Patient agreeable to OT treatment session, upon arrival patient was found seated OOB to Chair  In comparison to previous session, patient with improvements in transfers   Patient requiring ocassional safety reminders  Patient continues to be functioning below baseline level, occupational performance remains limited secondary to factors listed above and increased risk for falls and injury  From OT standpoint, recommendation at time of d/c would be Home OT and 24 hour supervision/ assist   Patient to benefit from continued Occupational Therapy treatment while on TCF to address deficits as defined above and maximize level of functional independence with ADLs and functional mobility  Plan   Treatment Interventions UE strengthening/ROM; Functional transfer training; Activityengagement   Goal Expiration Date (9/28) Treatment Day 3   OT Frequency (6 x /wk)   Recommendation   OT Discharge Recommendation (Home OT/24/S unless confusion resolves)   Equipment Recommended (TBD)   OT - OK to Discharge Lillie Koehler, [address]

## 2018-09-22 LAB
GLUCOSE SERPL-MCNC: 125 MG/DL (ref 70–99)
GLUCOSE SERPL-MCNC: 149 MG/DL (ref 70–99)
GLUCOSE SERPL-MCNC: 175 MG/DL (ref 70–99)
GLUCOSE SERPL-MCNC: 175 MG/DL (ref 70–99)

## 2018-09-22 PROCEDURE — 97110 THERAPEUTIC EXERCISES: CPT

## 2018-09-22 PROCEDURE — 97116 GAIT TRAINING THERAPY: CPT | Performed by: PHYSICAL THERAPIST

## 2018-09-22 PROCEDURE — 97530 THERAPEUTIC ACTIVITIES: CPT

## 2018-09-22 PROCEDURE — 82948 REAGENT STRIP/BLOOD GLUCOSE: CPT

## 2018-09-22 PROCEDURE — 97535 SELF CARE MNGMENT TRAINING: CPT

## 2018-09-22 PROCEDURE — 97110 THERAPEUTIC EXERCISES: CPT | Performed by: PHYSICAL THERAPIST

## 2018-09-22 RX ADMIN — AMPICILLIN SODIUM 2000 MG: 2 INJECTION, POWDER, FOR SOLUTION INTRAMUSCULAR; INTRAVENOUS at 15:44

## 2018-09-22 RX ADMIN — ATORVASTATIN CALCIUM 40 MG: 40 TABLET, FILM COATED ORAL at 17:21

## 2018-09-22 RX ADMIN — TAMSULOSIN HYDROCHLORIDE 0.4 MG: 0.4 CAPSULE ORAL at 17:21

## 2018-09-22 RX ADMIN — AMPICILLIN SODIUM 2000 MG: 2 INJECTION, POWDER, FOR SOLUTION INTRAMUSCULAR; INTRAVENOUS at 21:01

## 2018-09-22 RX ADMIN — FUROSEMIDE 20 MG: 20 TABLET ORAL at 09:16

## 2018-09-22 RX ADMIN — Medication 1 TABLET: at 09:17

## 2018-09-22 RX ADMIN — CEFTRIAXONE 2000 MG: 2 INJECTION, SOLUTION INTRAVENOUS at 10:47

## 2018-09-22 RX ADMIN — INSULIN LISPRO 1 UNITS: 100 INJECTION, SOLUTION INTRAVENOUS; SUBCUTANEOUS at 12:34

## 2018-09-22 RX ADMIN — FINASTERIDE 5 MG: 5 TABLET, FILM COATED ORAL at 09:17

## 2018-09-22 RX ADMIN — METOPROLOL SUCCINATE 50 MG: 50 TABLET, EXTENDED RELEASE ORAL at 09:17

## 2018-09-22 RX ADMIN — VITAMIN D, TAB 1000IU (100/BT) 1000 UNITS: 25 TAB at 09:17

## 2018-09-22 RX ADMIN — SENNOSIDES 8.6 MG: 8.6 TABLET, FILM COATED ORAL at 09:17

## 2018-09-22 RX ADMIN — FLUTICASONE PROPIONATE 1 SPRAY: 50 SPRAY, METERED NASAL at 10:07

## 2018-09-22 RX ADMIN — AMPICILLIN SODIUM 2000 MG: 2 INJECTION, POWDER, FOR SOLUTION INTRAMUSCULAR; INTRAVENOUS at 03:24

## 2018-09-22 RX ADMIN — INSULIN LISPRO 1 UNITS: 100 INJECTION, SOLUTION INTRAVENOUS; SUBCUTANEOUS at 21:09

## 2018-09-22 RX ADMIN — LOSARTAN POTASSIUM 100 MG: 50 TABLET, FILM COATED ORAL at 09:16

## 2018-09-22 RX ADMIN — GLIPIZIDE 5 MG: 5 TABLET ORAL at 17:21

## 2018-09-22 RX ADMIN — GLIPIZIDE 5 MG: 5 TABLET ORAL at 08:26

## 2018-09-22 RX ADMIN — POTASSIUM CHLORIDE 20 MEQ: 20 TABLET, EXTENDED RELEASE ORAL at 17:21

## 2018-09-22 RX ADMIN — PANTOPRAZOLE SODIUM 40 MG: 40 TABLET, DELAYED RELEASE ORAL at 06:45

## 2018-09-22 RX ADMIN — SITAGLIPTIN 100 MG: 100 TABLET, FILM COATED ORAL at 09:56

## 2018-09-22 RX ADMIN — AMPICILLIN SODIUM 2000 MG: 2 INJECTION, POWDER, FOR SOLUTION INTRAMUSCULAR; INTRAVENOUS at 09:52

## 2018-09-22 RX ADMIN — DOCUSATE SODIUM 100 MG: 100 CAPSULE, LIQUID FILLED ORAL at 09:17

## 2018-09-22 RX ADMIN — CEFTRIAXONE 2000 MG: 2 INJECTION, SOLUTION INTRAVENOUS at 20:21

## 2018-09-22 RX ADMIN — POTASSIUM CHLORIDE 20 MEQ: 20 TABLET, EXTENDED RELEASE ORAL at 09:16

## 2018-09-22 NOTE — OCCUPATIONAL THERAPY NOTE
Occupational Therapy Treatment Note    Name:  Janay Davis   MRN:   [id number]  Age:     80 y o  Patient Active Problem List   Diagnosis    Endocarditis of mitral valve    Status post non-ST elevation myocardial infarction (NSTEMI)    Candida esophagitis (HCC)    Chronic diastolic (congestive) heart failure (HCC)    Type 2 diabetes mellitus without complication, without long-term current use of insulin (HCC)    Essential hypertension    BPH (benign prostatic hyperplasia)    CKD (chronic kidney disease), stage III    Atrial fibrillation (HCC)    Anemia    Coronary artery disease involving native coronary artery     Endocarditis [I38]      Subjective/Goals: " I had pain last night,not now"    Vitals:161/69 HR 77    OT total treatment time: 38    Additional goals & Comments: Pt performed UE strengthening and HM today  Attempted laundry task, pt became incontinent of urine, returned to room to change clothing, reassurance provided as pt appeared embarrassed  09/22/18 1114   Restrictions/Precautions   Weight Bearing Precautions Per Order No   Other Precautions Chair Alarm;Cognitive; Bed Alarm   Lifestyle   Reciprocal Relationships Family   Intrinsic Gratification watching footbal   Pain Assessment   Pain Assessment No/denies pain   ADL   LB Dressing Assistance (MI to don/ doff shoes, Min A to don pants, S to pullup)   Toileting Assistance  6  Modified independent   Toileting Deficit (to clean up urine after incontinence w/ wipes)   Toileting Comments (pt incontinent of urine during tx, dependent to don depends)   Light Housekeeping   Light Housekeeping Level (S/ MI for bed making, G safety)   Light Housekeeping (Attempted laundry task, pt became incontinent of urine )   Functional Standing Tolerance   Time `5min   Activity bed making   Transfers   Sit to Stand 6  Modified independent   Additional items Armrests   Stand to Sit 6  Modified independent   Additional items Armrests   Stand pivot 5 Supervision   Additional items (w/ RW)   Additional Comments F Dyn, F+ Static   Functional Mobility   Functional Mobility 5  Supervision   Additional items Rolling walker   Therapeutic Excerise-Strength   UE Strength (BUE AROM w/ 1# dumbbells 30 reps, 2 planes)   Cognition   Overall Cognitive Status WFL   Arousal/Participation Alert; Cooperative, slightly confused at times   Attention Within functional limits   Following Commands Follows all commands and directions without difficulty   Activity Tolerance   Activity Tolerance Patient tolerated treatment well   Assessment   Assessment Patient participated in Skilled OT session this date with interventions consisting of ADL re training with the use of correct body mechnaics, safety awareness and fall prevention techniques, therapeutic exercise to: increase functional use of BUEs, increase BUE muscle strength  and  therapeutic activities to: increase activity tolerance   Patient agreeable to OT treatment session, upon arrival patient was found seated OOB to Chair  In comparison to previous session, patient with improvements in    Patient requiring ocassional safety reminders  Patient continues to be functioning below baseline level, occupational performance remains limited secondary to factors listed above and increased risk for falls and injury  From OT standpoint, recommendation at time of d/c would be 24 hour supervision/ assist unless confusion resolves  Patient to benefit from continued Occupational Therapy treatment while on TCF to address deficits as defined above and maximize level of functional independence with ADLs and functional mobility  Plan   Treatment Interventions UE strengthening/ROM;ADL retraining; Activityengagement   Goal Expiration Date 09/28/18   Treatment Day 4   OT Frequency (6x/wk)   Recommendation   OT Discharge Recommendation (24/ 7 S unless confusion resolves)   OT - OK to Discharge Lillie Villela, [address]

## 2018-09-22 NOTE — PLAN OF CARE
Problem: OCCUPATIONAL THERAPY ADULT  Goal: Performs self-care activities at highest level of function for planned discharge setting  See evaluation for individualized goals  Treatment Interventions: ADL retraining, Functional transfer training, UE strengthening/ROM, Endurance training, Patient/family training, Cognitive reorientation, Equipment evaluation/education, Neuromuscular reeducation, Activityengagement  Equipment Recommended:  (TBD)       See flowsheet documentation for full assessment, interventions and recommendations  Outcome: Progressing  Limitation: Decreased ADL status, Decreased UE strength, Decreased cognition, Decreased endurance, Decreased self-care trans, Decreased high-level ADLs  Prognosis: Good  Assessment: Patient participated in Skilled OT session this date with interventions consisting of ADL re training with the use of correct body mechnaics, safety awareness and fall prevention techniques, therapeutic exercise to: increase functional use of BUEs, increase BUE muscle strength  and  therapeutic activities to: increase activity tolerance   Patient agreeable to OT treatment session, upon arrival patient was found seated OOB to Chair  In comparison to previous session, patient with improvements in    Patient requiring ocassional safety reminders  Patient continues to be functioning below baseline level, occupational performance remains limited secondary to factors listed above and increased risk for falls and injury  From OT standpoint, recommendation at time of d/c would be 24 hour supervision/ assist unless confusion resolves  Patient to benefit from continued Occupational Therapy treatment while on TCF to address deficits as defined above and maximize level of functional independence with ADLs and functional mobility        OT Discharge Recommendation:  (24/ 7 S unless confusion resolves)  OT - OK to Discharge: No      Comments: Shae Pang

## 2018-09-22 NOTE — PLAN OF CARE
Problem: PHYSICAL THERAPY ADULT  Goal: Performs mobility at highest level of function for planned discharge setting  See evaluation for individualized goals  Treatment/Interventions: ADL retraining, Functional transfer training, LE strengthening/ROM, Elevations, Therapeutic exercise, Endurance training, Cognitive reorientation, Patient/family training, Equipment eval/education, Bed mobility, Gait training, Spoke to nursing, OT, Family, Spoke to case management (Speak to )  Equipment Recommended: Other (Comment) (To be determined)       See flowsheet documentation for full assessment, interventions and recommendations  Outcome: Progressing  Prognosis: Fair  Problem List: Decreased strength, Decreased endurance, Impaired balance  Assessment: Very good paco  to tx  No pain after tx  Motivated  Barriers to Discharge: Inaccessible home environment, Decreased caregiver support, Other (Comment) (Lives alone)                See flowsheet documentation for full assessment

## 2018-09-22 NOTE — PHYSICAL THERAPY NOTE
30 min     09/22/18 0930   Pain Assessment   Pain Assessment No/denies pain   Restrictions/Precautions   Weight Bearing Precautions Per Order No   General   Chart Reviewed Yes   Response to Previous Treatment Patient with no complaints from previous session  Cognition   Overall Cognitive Status WFL   Arousal/Participation Alert; Cooperative   Attention Within functional limits   Orientation Level Oriented X4   Following Commands Follows all commands and directions without difficulty   Subjective   Subjective "I am fine today "   Bed Mobility   Rolling R 6  Modified independent   Supine to Sit 6  Modified independent   Sit to Supine 6  Modified independent   Transfers   Sit to Stand 7  Independent   Stand to Sit 6  Modified independent   Stand pivot 5  Supervision   Ambulation/Elevation   Gait pattern Decreased foot clearance   Gait Assistance 5  Supervision   Assistive Device Rolling walker   Distance 150' w  RW   Stair Management Technique (NP)   Balance   Static Sitting Good   Static Standing Fair   Dynamic Standing Fair -   Ambulatory Fair   Endurance Deficit   Endurance Deficit Yes   Endurance Deficit Description (rest periods needed)   Activity Tolerance   Activity Tolerance Patient tolerated treatment well   Exercises   Knee AROM Long Arc Quad (sitting, 20x)   Ankle Pumps (sitting, 20x)   Marching Standing;20 reps   Assessment   Assessment Very good paco  to tx  No pain after tx  Motivated     Goals   Patient Goals "to go home and be stronger"   STG Expiration Date 09/29/18   LTG Expiration Date 09/29/18   Treatment Day 4  (4)   Plan   Progress Progressing toward goals

## 2018-09-23 LAB
GLUCOSE SERPL-MCNC: 137 MG/DL (ref 70–99)
GLUCOSE SERPL-MCNC: 84 MG/DL (ref 70–99)

## 2018-09-23 PROCEDURE — 82948 REAGENT STRIP/BLOOD GLUCOSE: CPT

## 2018-09-23 RX ADMIN — TUBERCULIN PURIFIED PROTEIN DERIVATIVE 5 UNITS: 5 INJECTION INTRADERMAL at 20:46

## 2018-09-23 RX ADMIN — DOCUSATE SODIUM 100 MG: 100 CAPSULE, LIQUID FILLED ORAL at 17:44

## 2018-09-23 RX ADMIN — FLUTICASONE PROPIONATE 1 SPRAY: 50 SPRAY, METERED NASAL at 09:56

## 2018-09-23 RX ADMIN — AMPICILLIN SODIUM 2000 MG: 2 INJECTION, POWDER, FOR SOLUTION INTRAMUSCULAR; INTRAVENOUS at 16:51

## 2018-09-23 RX ADMIN — CEFTRIAXONE 2000 MG: 2 INJECTION, SOLUTION INTRAVENOUS at 20:43

## 2018-09-23 RX ADMIN — POTASSIUM CHLORIDE 20 MEQ: 20 TABLET, EXTENDED RELEASE ORAL at 17:29

## 2018-09-23 RX ADMIN — GLIPIZIDE 5 MG: 5 TABLET ORAL at 07:15

## 2018-09-23 RX ADMIN — METOPROLOL SUCCINATE 50 MG: 50 TABLET, EXTENDED RELEASE ORAL at 09:55

## 2018-09-23 RX ADMIN — ATORVASTATIN CALCIUM 40 MG: 40 TABLET, FILM COATED ORAL at 16:53

## 2018-09-23 RX ADMIN — SENNOSIDES 8.6 MG: 8.6 TABLET, FILM COATED ORAL at 09:55

## 2018-09-23 RX ADMIN — AMPICILLIN SODIUM 2000 MG: 2 INJECTION, POWDER, FOR SOLUTION INTRAMUSCULAR; INTRAVENOUS at 21:27

## 2018-09-23 RX ADMIN — DOCUSATE SODIUM 100 MG: 100 CAPSULE, LIQUID FILLED ORAL at 09:55

## 2018-09-23 RX ADMIN — TAMSULOSIN HYDROCHLORIDE 0.4 MG: 0.4 CAPSULE ORAL at 16:53

## 2018-09-23 RX ADMIN — AMPICILLIN SODIUM 2000 MG: 2 INJECTION, POWDER, FOR SOLUTION INTRAMUSCULAR; INTRAVENOUS at 10:53

## 2018-09-23 RX ADMIN — Medication 1 TABLET: at 09:54

## 2018-09-23 RX ADMIN — PANTOPRAZOLE SODIUM 40 MG: 40 TABLET, DELAYED RELEASE ORAL at 06:39

## 2018-09-23 RX ADMIN — FINASTERIDE 5 MG: 5 TABLET, FILM COATED ORAL at 09:54

## 2018-09-23 RX ADMIN — AMPICILLIN SODIUM 2000 MG: 2 INJECTION, POWDER, FOR SOLUTION INTRAMUSCULAR; INTRAVENOUS at 03:04

## 2018-09-23 RX ADMIN — GLIPIZIDE 5 MG: 5 TABLET ORAL at 16:53

## 2018-09-23 RX ADMIN — LOSARTAN POTASSIUM 100 MG: 50 TABLET, FILM COATED ORAL at 09:54

## 2018-09-23 RX ADMIN — CEFTRIAXONE 2000 MG: 2 INJECTION, SOLUTION INTRAVENOUS at 09:53

## 2018-09-23 RX ADMIN — VITAMIN D, TAB 1000IU (100/BT) 1000 UNITS: 25 TAB at 09:54

## 2018-09-23 RX ADMIN — FUROSEMIDE 20 MG: 20 TABLET ORAL at 09:55

## 2018-09-23 RX ADMIN — SITAGLIPTIN 100 MG: 100 TABLET, FILM COATED ORAL at 09:54

## 2018-09-23 RX ADMIN — POTASSIUM CHLORIDE 20 MEQ: 20 TABLET, EXTENDED RELEASE ORAL at 09:54

## 2018-09-24 PROBLEM — R33.8 BENIGN PROSTATIC HYPERPLASIA WITH URINARY RETENTION: Status: ACTIVE | Noted: 2018-09-18

## 2018-09-24 PROBLEM — N17.9 AKI (ACUTE KIDNEY INJURY) (HCC): Status: ACTIVE | Noted: 2018-09-24

## 2018-09-24 LAB
ALBUMIN SERPL BCP-MCNC: 2.7 G/DL (ref 3–5.2)
ALP SERPL-CCNC: 59 U/L (ref 43–122)
ALT SERPL W P-5'-P-CCNC: 22 U/L (ref 9–52)
ANION GAP SERPL CALCULATED.3IONS-SCNC: 5 MMOL/L (ref 5–14)
AST SERPL W P-5'-P-CCNC: 20 U/L (ref 17–59)
BILIRUB SERPL-MCNC: 0.4 MG/DL
BUN SERPL-MCNC: 48 MG/DL (ref 5–25)
CALCIUM SERPL-MCNC: 8.4 MG/DL (ref 8.4–10.2)
CHLORIDE SERPL-SCNC: 105 MMOL/L (ref 97–108)
CO2 SERPL-SCNC: 27 MMOL/L (ref 22–30)
CREAT SERPL-MCNC: 2.36 MG/DL (ref 0.7–1.5)
ERYTHROCYTE [DISTWIDTH] IN BLOOD BY AUTOMATED COUNT: 18.7 %
GFR SERPL CREATININE-BSD FRML MDRD: 24 ML/MIN/1.73SQ M
GLUCOSE P FAST SERPL-MCNC: 47 MG/DL (ref 70–99)
GLUCOSE SERPL-MCNC: 47 MG/DL (ref 70–99)
HCT VFR BLD AUTO: 26.1 % (ref 41–53)
HGB BLD-MCNC: 8.6 G/DL (ref 13.5–17.5)
MCH RBC QN AUTO: 30.2 PG (ref 26.8–34.3)
MCHC RBC AUTO-ENTMCNC: 33 G/DL (ref 31.4–37.4)
MCV RBC AUTO: 92 FL (ref 80–100)
PLATELET # BLD AUTO: 213 THOUSANDS/UL (ref 150–450)
PMV BLD AUTO: 7.3 FL (ref 8.9–12.7)
POTASSIUM SERPL-SCNC: 5.7 MMOL/L (ref 3.6–5)
PROT SERPL-MCNC: 5.3 G/DL (ref 5.9–8.4)
RBC # BLD AUTO: 2.85 MILLION/UL (ref 4.5–5.9)
SODIUM SERPL-SCNC: 137 MMOL/L (ref 137–147)
WBC # BLD AUTO: 6.5 THOUSAND/UL (ref 4.31–10.16)

## 2018-09-24 PROCEDURE — 97110 THERAPEUTIC EXERCISES: CPT

## 2018-09-24 PROCEDURE — 99309 SBSQ NF CARE MODERATE MDM 30: CPT | Performed by: FAMILY MEDICINE

## 2018-09-24 PROCEDURE — 85027 COMPLETE CBC AUTOMATED: CPT | Performed by: FAMILY MEDICINE

## 2018-09-24 PROCEDURE — 97530 THERAPEUTIC ACTIVITIES: CPT

## 2018-09-24 PROCEDURE — 97116 GAIT TRAINING THERAPY: CPT

## 2018-09-24 PROCEDURE — 80053 COMPREHEN METABOLIC PANEL: CPT | Performed by: FAMILY MEDICINE

## 2018-09-24 RX ORDER — SODIUM CHLORIDE 9 MG/ML
100 INJECTION, SOLUTION INTRAVENOUS CONTINUOUS
Status: DISCONTINUED | OUTPATIENT
Start: 2018-09-24 | End: 2018-09-27

## 2018-09-24 RX ORDER — AMLODIPINE BESYLATE 5 MG/1
5 TABLET ORAL DAILY
Status: DISCONTINUED | OUTPATIENT
Start: 2018-09-24 | End: 2018-10-04 | Stop reason: HOSPADM

## 2018-09-24 RX ORDER — NYSTATIN 100000 U/G
CREAM TOPICAL 2 TIMES DAILY
Status: DISCONTINUED | OUTPATIENT
Start: 2018-09-24 | End: 2018-10-04 | Stop reason: HOSPADM

## 2018-09-24 RX ORDER — GLIPIZIDE 5 MG/1
2.5 TABLET ORAL
Status: DISCONTINUED | OUTPATIENT
Start: 2018-09-24 | End: 2018-10-01

## 2018-09-24 RX ORDER — SODIUM POLYSTYRENE SULFONATE 15 G/60ML
15 SUSPENSION ORAL; RECTAL ONCE
Status: COMPLETED | OUTPATIENT
Start: 2018-09-24 | End: 2018-09-24

## 2018-09-24 RX ADMIN — TAMSULOSIN HYDROCHLORIDE 0.4 MG: 0.4 CAPSULE ORAL at 16:37

## 2018-09-24 RX ADMIN — Medication 1 TABLET: at 10:38

## 2018-09-24 RX ADMIN — DOCUSATE SODIUM 100 MG: 100 CAPSULE, LIQUID FILLED ORAL at 17:06

## 2018-09-24 RX ADMIN — VITAMIN D, TAB 1000IU (100/BT) 1000 UNITS: 25 TAB at 10:40

## 2018-09-24 RX ADMIN — GLIPIZIDE 5 MG: 5 TABLET ORAL at 07:00

## 2018-09-24 RX ADMIN — AMPICILLIN SODIUM 2000 MG: 2 INJECTION, POWDER, FOR SOLUTION INTRAMUSCULAR; INTRAVENOUS at 20:42

## 2018-09-24 RX ADMIN — PANTOPRAZOLE SODIUM 40 MG: 40 TABLET, DELAYED RELEASE ORAL at 05:25

## 2018-09-24 RX ADMIN — FLUTICASONE PROPIONATE 1 SPRAY: 50 SPRAY, METERED NASAL at 11:13

## 2018-09-24 RX ADMIN — ATORVASTATIN CALCIUM 40 MG: 40 TABLET, FILM COATED ORAL at 16:37

## 2018-09-24 RX ADMIN — SITAGLIPTIN 100 MG: 100 TABLET, FILM COATED ORAL at 10:40

## 2018-09-24 RX ADMIN — NYSTATIN: 100000 CREAM TOPICAL at 14:49

## 2018-09-24 RX ADMIN — AMPICILLIN SODIUM 2000 MG: 2 INJECTION, POWDER, FOR SOLUTION INTRAMUSCULAR; INTRAVENOUS at 03:00

## 2018-09-24 RX ADMIN — METOPROLOL SUCCINATE 50 MG: 50 TABLET, EXTENDED RELEASE ORAL at 10:38

## 2018-09-24 RX ADMIN — CEFTRIAXONE 2000 MG: 2 INJECTION, SOLUTION INTRAVENOUS at 20:06

## 2018-09-24 RX ADMIN — FINASTERIDE 5 MG: 5 TABLET, FILM COATED ORAL at 10:40

## 2018-09-24 RX ADMIN — SODIUM POLYSTYRENE SULFONATE 15 G: 15 SUSPENSION ORAL; RECTAL at 11:07

## 2018-09-24 RX ADMIN — CEFTRIAXONE 2000 MG: 2 INJECTION, SOLUTION INTRAVENOUS at 10:32

## 2018-09-24 RX ADMIN — AMPICILLIN SODIUM 2000 MG: 2 INJECTION, POWDER, FOR SOLUTION INTRAMUSCULAR; INTRAVENOUS at 14:47

## 2018-09-24 RX ADMIN — DOCUSATE SODIUM 100 MG: 100 CAPSULE, LIQUID FILLED ORAL at 10:39

## 2018-09-24 RX ADMIN — SODIUM CHLORIDE 100 ML/HR: 900 INJECTION INTRAVENOUS at 10:32

## 2018-09-24 RX ADMIN — AMPICILLIN SODIUM 2000 MG: 2 INJECTION, POWDER, FOR SOLUTION INTRAMUSCULAR; INTRAVENOUS at 11:08

## 2018-09-24 RX ADMIN — AMLODIPINE BESYLATE 5 MG: 5 TABLET ORAL at 14:48

## 2018-09-24 RX ADMIN — GLIPIZIDE 2.5 MG: 5 TABLET ORAL at 16:36

## 2018-09-24 RX ADMIN — NYSTATIN: 100000 CREAM TOPICAL at 17:06

## 2018-09-24 RX ADMIN — SODIUM CHLORIDE 100 ML/HR: 900 INJECTION INTRAVENOUS at 21:21

## 2018-09-24 NOTE — NURSING NOTE
The pt co of "dribbling and pain" at his penis  The pt was bladder scanned for 798 cc  The tip of the pts penis is red  Dr Drew Subramanian on the unit and ordered a butt inserted, an urology consult initiated  and nystatin cream for the tip of the penis  Dr Drew Subramanian also ordered [facility] for a potassium level of 5 7  The pt had an xlarge loose Bm  Dr Drew Subramanian also d/c several medications and Accuchecks  Also IV NSS was started at 100 cc/hr rt increased BUN and Creat

## 2018-09-24 NOTE — ASSESSMENT & PLAN NOTE
Blood pressure is moderately controlled at this time  Continue metoprolol  Hold losartan and Lasix at this time    Will place him on Norvasc if required for blood pressure controlled due to [facility] and hyperkalemia  Monitor blood pressure

## 2018-09-24 NOTE — PROGRESS NOTES
Progress Note - Anna Look 6/21/1929, 80 y o  male MRN: [id number]    Unit/Bed#: [facility] 547-01 Encounter: [id number]    Primary Care Provider: Raquel Cabot, MD   Date and time admitted to hospital: 9/18/2018  6:15 PM        BALDEV (acute kidney injury) [facility])   Assessment & Plan    Patient's creatinine is elevated to 2 36  His baseline is 1 1  Potassium is also elevated to 5 7  Will discontinue potassium supplements and stop losartan and Lasix at this time  Placed on gentle hydration and Simmons catheter has been placed as he seems like he has urinary retention  Recheck a BMP tomorrow        Benign prostatic hyperplasia with urinary retention   Assessment & Plan    With urinary retention continue Flomax and finasteride  Patient's has acute kidney injury secondary to acute urinary retention obstructive uropathy  He has history of mild to moderate hydronephrosis in the past as well  Was on 3 weeks of Simmons catheter use and failed a voiding trial at previous hospital   He again has acute urinary retention with postvoid bladder residual of 789  Will place a Simmons catheter 5  Also place nystatin cream for fungal infection around the penile urethra  Will probably need a urology consult and do not do a voiding trial   Two kidney better ultrasounds done in the last 1 month  Will not repeat an ultrasound at this time  Repeat daily BMPs until renal function is back to normal         Essential hypertension   Assessment & Plan    Blood pressure is moderately controlled at this time  Continue metoprolol  Hold losartan and Lasix at this time  Will place him on Norvasc if required for blood pressure controlled due to Baldev and hyperkalemia  Monitor blood pressure        Type 2 diabetes mellitus without complication, without long-term current use of insulin [facility])   Assessment & Plan    Lab Results   Component Value Date    HGBA1C 6 6 (H) 09/19/2018     Will continue ADA diet   Accu-Cheks insulin sliding scale  Lantus was discontinued due to hypoglycemia at [facility]  Will continue oral DM Rx  Will decrease glipizide to 2 5 mg twice daily secondary to [facility]  Discontinue fingerstick blood sugars  Check hemoglobin A1c is 6 6              acute hyperkalemia:  Given a dose of Kayexalate  Hold potassium supplements losartan and Lasix for now  Recheck BMP tomorrow    Infected endocarditis:  Last day of antibiotics is 2018  Complete course  Labs reviewed from today  VTE Pharmacologic Prophylaxis:   Pharmacologic: Pharmacologic VTE Prophylaxis contraindicated due to rehab floor  Encourage ambulation  Mechanical VTE Prophylaxis in Place: Yes    Patient Centered Rounds: I have performed bedside rounds with nursing staff today  Discussions with Specialists or Other Care Team Provider: none    Education and Discussions with Family / Patient:   Discussed with the patient about his kidney issues    Time Spent for Care: 30 minutes  More than 50% of total time spent on counseling and coordination of care as described above  Current Length of Stay: 6 day(s)    Current Patient Status: SNF Short Term Inpatient   Certification Statement: The patient will continue to require additional inpatient hospital stay due to Rehab needs    Discharge Plan: as per rehab dept    Code Status: Level 1 - Full Code      Subjective:   Patient states that he was having trouble urinating yesterday  It was hurting and burning him any tried to urinate  He also felt like he could not empty his bladder completely  Objective:     Vitals:   Temp (24hrs), Av °F (36 7 °C), Min:97 7 °F (36 5 °C), Max:98 2 °F (36 8 °C)    HR:  [73-86] 86  Resp:  [17-20] 20  BP: (111-152)/(64-68) 152/64  SpO2:  [96 %-97 %] 97 %  Body mass index is 19 89 kg/m²  Input and Output Summary (last 24 hours):        Intake/Output Summary (Last 24 hours) at 18 1234  Last data filed at 18 2115   Gross per 24 hour   Intake              600 ml   Output              800 ml Net             -200 ml       Physical Exam:     Physical Exam   Constitutional: He is oriented to person, place, and time  He appears well-developed and well-nourished  HENT:   Head: Normocephalic and atraumatic  Right Ear: External ear normal    Left Ear: External ear normal    Mouth/Throat: Oropharynx is clear and moist    Eyes: Conjunctivae and EOM are normal  Pupils are equal, round, and reactive to light  Neck: Normal range of motion  Neck supple  Cardiovascular: Normal rate, regular rhythm, normal heart sounds and intact distal pulses  Pulmonary/Chest: Effort normal and breath sounds normal    Abdominal: Soft  Bowel sounds are normal  He exhibits no mass  There is tenderness  There is no rebound and no guarding  Genitourinary:   Genitourinary Comments: deferred   Musculoskeletal: Normal range of motion  Neurological: He is alert and oriented to person, place, and time  He has normal reflexes  Skin: Skin is warm and dry  No rash noted  Psychiatric: He has a normal mood and affect  Nursing note and vitals reviewed          Additional Data:     Labs:      Results from last 7 days  Lab Units 09/24/18  0523 09/19/18  0617   WBC Thousand/uL 6 50 5 50   HEMOGLOBIN g/dL 8 6* 9 5*   HEMATOCRIT % 26 1* 28 0*   PLATELETS Thousands/uL 213 272   LYMPHO PCT %  --  15*   MONO PCT MAN %  --  6   EOSINO PCT MANUAL %  --  17*       Results from last 7 days  Lab Units 09/24/18  0522   SODIUM mmol/L 137   POTASSIUM mmol/L 5 7*   CHLORIDE mmol/L 105   CO2 mmol/L 27   BUN mg/dL 48*   CREATININE mg/dL 2 36*   CALCIUM mg/dL 8 4   ALK PHOS U/L 59   ALT U/L 22   AST U/L 20           Results from last 7 days  Lab Units 09/23/18  1119 09/23/18  0622 09/22/18  2053 09/22/18  1625 09/22/18  1143 09/22/18  0615 09/21/18  2038 09/21/18  1655 09/21/18  1146 09/21/18  0616 09/20/18  2047 09/20/18  1638   POC GLUCOSE mg/dl 137* 84 175* 149* 175* 125* 153* 115* 177* 84 172* 96       Results from last 7 days  Lab Units 09/19/18  0617   HEMOGLOBIN A1C % 6 6*         * I Have Reviewed All Lab Data Listed Above  * Additional Pertinent Lab Tests Reviewed: [place] [facility] Admission Reviewed    Imaging:    Imaging Reports Reviewed Today Include:   Renal ultrasound  Imaging Personally Reviewed by Myself Includes:  none    Recent Cultures (last 7 days):           Last 24 Hours Medication List:     Current Facility-Administered Medications:  acetaminophen 650 mg Oral Q6H PRN Lance Herrera MD    amLODIPine 5 mg Oral Daily Didi Rowe MD    ampicillin 2,000 mg Intravenous Q6H Lance Herrera MD Last Rate: 2,000 mg (09/24/18 1108)   atorvastatin 40 mg Oral Daily With Juvencio Toscano MD    benzonatate 200 mg Oral TID PRN Lance Herrera MD    cefTRIAXone 2,000 mg Intravenous Q12H Lance Herrera MD Last Rate: 2,000 mg (09/24/18 1032)   cholecalciferol 1,000 Units Oral Daily Lance Herrera MD    docusate sodium 100 mg Oral BID Lance Herrera MD    finasteride 5 mg Oral Daily Lance Herrera MD    fluticasone 1 spray Nasal Daily Lance Herrera MD    glipiZIDE 2 5 mg Oral BID AC Didi Rowe MD    metoprolol succinate 50 mg Oral Daily Lance Herrera MD    nitroglycerin 0 4 mg Sublingual Q5 Min PRN Lance Herrera MD    nystatin  Topical BID Didi Rowe MD    senna 1 tablet Oral Daily Lance Herrera MD    sitaGLIPtin 100 mg Oral Daily Lance Herrera MD    sodium chloride 100 mL/hr Intravenous Continuous Didi Rowe MD Last Rate: 100 mL/hr (09/24/18 1032)   tamsulosin 0 4 mg Oral Daily With Dinner Yumiko Dapper, CRNP    tiotropium 18 mcg Inhalation Daily PRN Jerry Fallon MD    tuberculin 5 Units Intradermal PRN Jerry Fallon MD         Today, Patient Was Seen By: Didi Rowe MD    ** Please Note: Dictation voice to text software may have been used in the creation of this document   **

## 2018-09-24 NOTE — ASSESSMENT & PLAN NOTE
Lab Results   Component Value Date    HGBA1C 6 6 (H) 09/19/2018     Will continue ADA diet  Accu-Cheks insulin sliding scale  Lantus was discontinued due to hypoglycemia at [facility]  Will continue oral DM Rx  Will decrease glipizide to 2 5 mg twice daily secondary to [facility]  Discontinue fingerstick blood sugars    Check hemoglobin A1c is 6 6

## 2018-09-24 NOTE — PROGRESS NOTES
RECREATIONAL THERAPY PARTICIPATION LOG      ACTIVITY:    GAMES:        BINGO:        MUSIC STIM:        ARTS & CRAFTS:        EXERCISE:        CLUBS & MEETING:        SOCIALS:        SPIRITUAL:        INDEPENDENT:        1:1:  Resident was seen for a Recreational Therapy greeting this morning  Resident was offered a visit to paint, pray in [place], [place] game, etc ,   Resident declined, due to an anticipated visit with his son, who will be visiting him from [place] today  Resident did remark about how impressed he was with our [facility] when we went there to al last week; this was a way to encourage his spirituality, which is an important part of a person, included in the psycho-social-spiritual well-being component of recreational therapy  Resident will continue to receive invitations for recreational therapy visits, in order to increase his overall well-being  JAKE Velarde

## 2018-09-24 NOTE — PLAN OF CARE
Problem: OCCUPATIONAL THERAPY ADULT  Goal: Performs self-care activities at highest level of function for planned discharge setting  See evaluation for individualized goals  Treatment Interventions: ADL retraining, Functional transfer training, UE strengthening/ROM, Endurance training, Patient/family training, Cognitive reorientation, Equipment evaluation/education, Neuromuscular reeducation, Activityengagement  Equipment Recommended:  (TBD)       See flowsheet documentation for full assessment, interventions and recommendations  Limitation: Decreased ADL status, Decreased UE strength, Decreased cognition, Decreased endurance, Decreased self-care trans, Decreased high-level ADLs  Prognosis: Good  Assessment: Patient participated in Skilled OT session this date with interventions consisting of therapeutic exercise to: increase functional use of BUEs, increase BUE muscle strength ,  therapeutic activities to: increase activity tolerance and increase standing tolerance time with unilateral UE support to complete sink level ADLs   Patient agreeable to OT treatment session, upon arrival patient was found exiting BR w/ CNA   Patient continues to be functioning below baseline level, occupational performance remains limited secondary to factors listed above and increased risk for falls and injury  From OT standpoint, recommendation at time of d/c would be Home OT w/ Family support pending progress  Patient to benefit from continued Occupational Therapy treatment while in the hospital to address deficits as defined above and maximize level of functional independence with ADLs and functional mobility        OT Discharge Recommendation: Home OT  OT - OK to Discharge: No      Comments: Tre Zabala

## 2018-09-24 NOTE — ASSESSMENT & PLAN NOTE
With urinary retention continue Flomax and finasteride  Patient's has acute kidney injury secondary to acute urinary retention obstructive uropathy  He has history of mild to moderate hydronephrosis in the past as well  Was on 3 weeks of Simmons catheter use and failed a voiding trial at previous hospital   He again has acute urinary retention with postvoid bladder residual of 789  Will place a Simmons catheter 5  Also place nystatin cream for fungal infection around the penile urethra  Will probably need a urology consult and do not do a voiding trial   Two kidney better ultrasounds done in the last 1 month  Will not repeat an ultrasound at this time    Repeat daily BMPs until renal function is back to normal

## 2018-09-24 NOTE — PLAN OF CARE
Problem: Potential for Falls  Goal: Patient will remain free of falls  INTERVENTIONS:  - Assess patient frequently for physical needs  -  Identify cognitive and physical deficits and behaviors that affect risk of falls  -  [place] fall precautions as indicated by assessment   - Educate patient/family on patient safety including physical limitations  - Instruct patient to call for assistance with activity based on assessment  - Modify environment to reduce risk of injury  - Consider OT/PT consult to assist with strengthening/mobility   Outcome: Progressing      Problem: PHYSICAL THERAPY ADULT  Goal: Performs mobility at highest level of function for planned discharge setting  See evaluation for individualized goals  Treatment/Interventions: ADL retraining, Functional transfer training, LE strengthening/ROM, Elevations, Therapeutic exercise, Endurance training, Cognitive reorientation, Patient/family training, Equipment eval/education, Bed mobility, Gait training, Spoke to nursing, OT, Family, Spoke to case management (Speak to )  Equipment Recommended: Other (Comment) (To be determined)       See flowsheet documentation for full assessment, interventions and recommendations  Prognosis: Good  Problem List: Decreased strength, Decreased endurance  Assessment: Pt reports feeling weak to day after frequent loose BMs yesterday  Pt also reporting burning with urination- nursing made aware  Pt reports wanting to use walker for amb today  Noted weakness on R LE with step taps and with LAQs  Barriers to Discharge: [address] home environment, Decreased caregiver support, Other (Comment) (Lives alone)                See flowsheet documentation for full assessment

## 2018-09-24 NOTE — OCCUPATIONAL THERAPY NOTE
Occupational Therapy Treatment Note    Name:  Jaden Pittman   MRN:   [id number]  Age:     80 y o  Patient Active Problem List   Diagnosis    Endocarditis of mitral valve    Status post non-ST elevation myocardial infarction (NSTEMI)    Candida esophagitis (HCC)    Chronic diastolic (congestive) heart failure (HCC)    Type 2 diabetes mellitus without complication, without long-term current use of insulin (HCC)    Essential hypertension    Benign prostatic hyperplasia with urinary retention    CKD (chronic kidney disease), stage III    Atrial fibrillation (HCC)    Anemia    Coronary artery disease involving native coronary artery    JAKE (acute kidney injury) ([facility] [address] )     Endocarditis [I38]      Subjective/Goals: " I feel like I'll be better tomorrow"    Vitals:135/62 HR 84    OT total treatment time:33 min    Additional goals & Comments: Pt treated in room 2nd urgency of BM earlier  Pt pleasant and cooperative, eager to visit with family whom were waiting in sunroom  All needs in reach, alarm in place end of tx      09/24/18 1408   Restrictions/Precautions   Weight Bearing Precautions Per Order No   Other Precautions Bed Alarm; Chair Alarm;Cognitive   Lifestyle   Reciprocal Relationships (Family)   Pain Assessment   Pain Assessment No/denies pain   ADL   Toileting Assistance  (Pt had just completed large, loose BM prior to IVEY entering)   Toileting Deficit (CNA had assisted pt w/ hyygiene)   Functional Standing Tolerance   Time (7min)   Activity (static stand)   Transfers   Sit to Stand 6  Modified independent   Additional items Armrests   Stand to Sit 6  Modified independent   Additional items Armrests   Stand pivot 5  Supervision   Additional items (RW)   Additional Comments F Dyn, F+ static   Therapeutic Exercise - ROM   UE-ROM (Restorator 8 min)   Therapeutic Excerise-Strength   UE Strength (30 curls, 2x10 chest press, Abd/ Add)   Cognition   Overall Cognitive Status [facility]   Arousal/Participation Alert; Cooperative   Attention Within functional limits   Orientation Level Oriented to person;Oriented to time;Oriented to situation   Following Commands Follows all commands and directions without difficulty   Activity Tolerance   Activity Tolerance Patient tolerated treatment well   Assessment   Assessment Patient participated in Skilled OT session this date with interventions consisting of therapeutic exercise to: increase functional use of BUEs, increase BUE muscle strength ,  therapeutic activities to: increase activity tolerance and increase standing tolerance time with unilateral UE support to complete sink level ADLs   Patient agreeable to OT treatment session, upon arrival patient was found exiting BR w/ CNA  Patient requiring   Patient continues to be functioning below baseline level, occupational performance remains limited secondary to factors listed above and increased risk for falls and injury  From OT standpoint, recommendation at time of d/c would be Home OT w/ Family support pending progress  Patient to benefit from continued Occupational Therapy treatment while in the hospital to address deficits as defined above and maximize level of functional independence with ADLs and functional mobility      Plan   Goal Expiration Date 09/28/18   Treatment Day 5   OT Frequency (5)   Recommendation   OT Discharge Recommendation Home OT   OT - OK to Discharge Lillie Preciado

## 2018-09-24 NOTE — ASSESSMENT & PLAN NOTE
Patient's creatinine is elevated to 2 36  His baseline is 1 1  Potassium is also elevated to 5 7  Will discontinue potassium supplements and stop losartan and Lasix at this time  Placed on gentle hydration and Simmons catheter has been placed as he seems like he has urinary retention    Recheck a BMP tomorrow

## 2018-09-24 NOTE — PHYSICAL THERAPY NOTE
29' session     09/24/18 0941   Pain Assessment   Pain Assessment No/denies pain   General   Chart Reviewed Yes   Family/Caregiver Present No   Cognition   Overall Cognitive Status WFL   Arousal/Participation Alert; Cooperative   Attention Within functional limits   Following Commands Follows all commands and directions without difficulty   Subjective   Subjective had lose bowels yesterday- I feel  a little weak to day - also reports burning with urination- nurse made aware   Transfers   Sit to Stand 6  Modified independent   Stand to Sit 6  Modified independent   Stand pivot 6  Modified independent   Toilet transfer 6  Modified independent   Additional items (manages clothsing hygiene and hand washing with no unstreadi)   Ambulation/Elevation   Gait pattern Forward Flexion   Gait Assistance 5  Supervision   Assistive Device Rolling walker   Distance 024',99, 75'  (10' x 2 with no AD and close S )   Stair Management Assistance (close S - 2 hands on L rail , step to )   Number of Stairs (2 x 2)   Balance   Dynamic Standing Fair -   Ambulatory Fair   Activity Tolerance   Activity Tolerance Patient tolerated treatment well   Exercises   Knee AROM Long Arc Quad (2# 10 x 2)   Marching (alternating step taps 10x 2 with 2# wts and S )   Assessment   Prognosis Good   Problem List Decreased strength;Decreased endurance   Assessment Pt reports feeling weak to day after frequent loose BMs yesterday  Pt also reporting burning with urination- nursing made aware  Pt reports wanting to use walker for amb today   Noted weakness on R LE with step taps and with LAQs   Goals   Patient Goals anything you decide   STG Expiration Date 09/29/18   LTG Expiration Date 09/29/18   Treatment Day 5   Plan   Treatment/Interventions (cont as per POC)   Progress Progressing toward goals

## 2018-09-25 LAB
ANION GAP SERPL CALCULATED.3IONS-SCNC: 8 MMOL/L (ref 5–14)
BUN SERPL-MCNC: 43 MG/DL (ref 5–25)
CALCIUM SERPL-MCNC: 7.6 MG/DL (ref 8.4–10.2)
CHLORIDE SERPL-SCNC: 106 MMOL/L (ref 97–108)
CO2 SERPL-SCNC: 26 MMOL/L (ref 22–30)
CREAT SERPL-MCNC: 1.91 MG/DL (ref 0.7–1.5)
GFR SERPL CREATININE-BSD FRML MDRD: 30 ML/MIN/1.73SQ M
GLUCOSE P FAST SERPL-MCNC: 46 MG/DL (ref 70–99)
GLUCOSE SERPL-MCNC: 46 MG/DL (ref 70–99)
POTASSIUM SERPL-SCNC: 4.6 MMOL/L (ref 3.6–5)
SODIUM SERPL-SCNC: 140 MMOL/L (ref 137–147)

## 2018-09-25 PROCEDURE — 97110 THERAPEUTIC EXERCISES: CPT

## 2018-09-25 PROCEDURE — 80048 BASIC METABOLIC PNL TOTAL CA: CPT | Performed by: FAMILY MEDICINE

## 2018-09-25 PROCEDURE — 97116 GAIT TRAINING THERAPY: CPT

## 2018-09-25 PROCEDURE — 99221 1ST HOSP IP/OBS SF/LOW 40: CPT | Performed by: PHYSICIAN ASSISTANT

## 2018-09-25 PROCEDURE — 0T9B70Z DRAINAGE OF BLADDER WITH DRAINAGE DEVICE, VIA NATURAL OR ARTIFICIAL OPENING: ICD-10-PCS | Performed by: INTERNAL MEDICINE

## 2018-09-25 PROCEDURE — 97530 THERAPEUTIC ACTIVITIES: CPT

## 2018-09-25 PROCEDURE — 99223 1ST HOSP IP/OBS HIGH 75: CPT | Performed by: INTERNAL MEDICINE

## 2018-09-25 RX ADMIN — DOCUSATE SODIUM 100 MG: 100 CAPSULE, LIQUID FILLED ORAL at 09:30

## 2018-09-25 RX ADMIN — FINASTERIDE 5 MG: 5 TABLET, FILM COATED ORAL at 09:30

## 2018-09-25 RX ADMIN — DOCUSATE SODIUM 100 MG: 100 CAPSULE, LIQUID FILLED ORAL at 17:17

## 2018-09-25 RX ADMIN — NYSTATIN: 100000 CREAM TOPICAL at 09:35

## 2018-09-25 RX ADMIN — AMPICILLIN SODIUM 2000 MG: 2 INJECTION, POWDER, FOR SOLUTION INTRAMUSCULAR; INTRAVENOUS at 09:32

## 2018-09-25 RX ADMIN — SODIUM CHLORIDE 100 ML/HR: 900 INJECTION INTRAVENOUS at 09:30

## 2018-09-25 RX ADMIN — SENNOSIDES 8.6 MG: 8.6 TABLET, FILM COATED ORAL at 09:30

## 2018-09-25 RX ADMIN — ATORVASTATIN CALCIUM 40 MG: 40 TABLET, FILM COATED ORAL at 17:17

## 2018-09-25 RX ADMIN — AMPICILLIN SODIUM 2000 MG: 2 INJECTION, POWDER, FOR SOLUTION INTRAMUSCULAR; INTRAVENOUS at 17:09

## 2018-09-25 RX ADMIN — METOPROLOL SUCCINATE 50 MG: 50 TABLET, EXTENDED RELEASE ORAL at 09:31

## 2018-09-25 RX ADMIN — GLIPIZIDE 2.5 MG: 5 TABLET ORAL at 09:34

## 2018-09-25 RX ADMIN — SODIUM CHLORIDE 100 ML/HR: 900 INJECTION INTRAVENOUS at 09:32

## 2018-09-25 RX ADMIN — TAMSULOSIN HYDROCHLORIDE 0.4 MG: 0.4 CAPSULE ORAL at 17:17

## 2018-09-25 RX ADMIN — SITAGLIPTIN 100 MG: 100 TABLET, FILM COATED ORAL at 09:30

## 2018-09-25 RX ADMIN — VITAMIN D, TAB 1000IU (100/BT) 1000 UNITS: 25 TAB at 09:30

## 2018-09-25 RX ADMIN — GLIPIZIDE 2.5 MG: 5 TABLET ORAL at 17:14

## 2018-09-25 RX ADMIN — SODIUM CHLORIDE 100 ML/HR: 900 INJECTION INTRAVENOUS at 07:44

## 2018-09-25 RX ADMIN — CEFTRIAXONE 2000 MG: 2 INJECTION, SOLUTION INTRAVENOUS at 20:05

## 2018-09-25 RX ADMIN — NYSTATIN: 100000 CREAM TOPICAL at 17:21

## 2018-09-25 RX ADMIN — AMPICILLIN SODIUM 2000 MG: 2 INJECTION, POWDER, FOR SOLUTION INTRAMUSCULAR; INTRAVENOUS at 02:40

## 2018-09-25 RX ADMIN — CEFTRIAXONE 2000 MG: 2 INJECTION, SOLUTION INTRAVENOUS at 09:29

## 2018-09-25 NOTE — NURSING NOTE
Pt awake, ot did some testing today  No c/o chest pain or respriatory distress  Physicians of infection control did see pt  I v and antibiotics c ontinue with picc line which is clear  Simmons draining clear yellow urine  Pt ot continue with therapy as tolerated  No c/o pain  Continue plan of care

## 2018-09-25 NOTE — CONSULTS
Consultation - Infectious Disease   José Luis Alegria 80 y o  male MRN: [id number]  Unit/Bed#: -01 Encounter: [id number]      IMPRESSION & RECOMMENDATIONS:   Impression/Recommendations:  1  Enterococcal MV endocarditis  Diagnosed at [facility]  Unclear source  CT A/P showed bladder thickening with hydro suggesting ?  source  No urine cultures done  Persistent bacteremia which finally cleared  Small vegetation seen on LAILA  Tolerating antibiotic course  Rec:  · Continue ampicillin/ceftriaxone x1 more day  · Check repeat blood cultures in 2 weeks    2  BPH with urinary retention  Chronic issue based on chart review  No clinical evidence of UTI  Rec:  · Continue Simmons, Flomax, finasteride per primary service  · Await urology consult    3  JAKE  Likely due to # 2  Improving with Simmons  Rec:  · Follow creatinine closely and dose-adjust antibiotics as indicated  · Check BMP in AM    The patient is stable from an ID standpoint  Antibiotics:  Ampicillin/ceftriaxone # 41    Thank you for this consultation  We will follow along with you  HISTORY OF PRESENT ILLNESS:  Reason for Consult:   Endocarditis    HPI: José Luis Alegria is a 80 y o  male with multiple medical problems with recent diagnosis of enterococcal bacteremia with native mitral valve endocarditis  Patient was recently admitted in early August at [facility] and was being treated with ampicillin and ceftriaxone for 6 weeks total through 09/26/2018  His outside records were reviewed in detail  CT A/P showed bladder wall thickening and moderate hydro  No urine cultures performed  He had persistent bacteremia and a small linear echodensity seen on LAILA  He was also recently diagnosed with Candida esophagitis status post 14 days of therapy with fluconazole  Had a more recent admission at [facility] for non ST elevation MI in the setting of severe anemia    He was transferred to [facility] on 09/18 to complete IV antibiotics and undergo rehab   His course at [facility] has been complicated by urinary retention despite Flomax and finasteride and a Simmons catheter was recently placed  Urology consult is pending  Given his recent endocarditis we are asked to comment on further evaluation and management  REVIEW OF SYSTEMS:  Denies fevers, chills, sweats, nausea, vomiting, or diarrhea  A complete system-based review of systems is otherwise negative  PAST MEDICAL HISTORY:  Past Medical History:   Diagnosis Date    Coronary artery disease     Diabetes mellitus ([address] )     History of transfusion     Hypertension     Renal disorder      Past Surgical History:   Procedure Laterality Date    HERNIA REPAIR         FAMILY HISTORY:  Non-contributory    SOCIAL HISTORY:  History   Alcohol Use No     History   Drug Use No     History   Smoking Status    Former Smoker    Packs/day: 1 00    Quit date: 1975   Smokeless Tobacco    Never Used       ALLERGIES:  No Known Allergies    MEDICATIONS:  All current active medications have been reviewed  PHYSICAL EXAM:  Vitals:  HR:  [85-93] 93  Resp:  [15-20] 20  BP: (118-152)/(50-58) 118/50  SpO2:  [95 %-98 %] 95 %  Temp (24hrs), Av 8 °F (36 6 °C), Min:97 6 °F (36 4 °C), Max:97 9 °F (36 6 °C)  Current: Temperature: 97 6 °F (36 4 °C)     Physical Exam:  General:  Well-nourished, well-developed, in no acute distress  Eyes:  Conjunctive clear with no hemorrhages or effusions  Oropharynx:  No ulcers, no lesions  Neck:  Supple, no lymphadenopathy  Lungs:  Clear to auscultation bilaterally, no accessory muscle use  Cardiac:  Regular rate and rhythm, soft systolic murmur  Abdomen:  Soft, non-tender, non-distended  Extremities:  No peripheral cyanosis, clubbing, or edema  Skin:  No rashes, no ulcers  Neurological:  Moves all four extremities spontaneously, sensation grossly intact    LABS, IMAGING, & OTHER STUDIES:  Lab Results:  I have personally reviewed pertinent labs      Results from last 7 days  Lab Units 09/25/18  0507 09/24/18  0522 09/19/18  0617   SODIUM mmol/L 140 137 135*   POTASSIUM mmol/L 4 6 5 7* 4 9   CHLORIDE mmol/L 106 105 99   CO2 mmol/L 26 27 33*   BUN mg/dL 43* 48* 35*   CREATININE mg/dL 1 91* 2 36* 1 10   EGFR ml/min/1 73sq m 30* 24* 59*   CALCIUM mg/dL 7 6* 8 4 8 8   AST U/L  --  20 25   ALT U/L  --  22 23   ALK PHOS U/L  --  59 70       Results from last 7 days  Lab Units 09/24/18  0523 09/19/18  0617   WBC Thousand/uL 6 50 5 50   HEMOGLOBIN g/dL 8 6* 9 5*   PLATELETS Thousands/uL 213 272           Imaging Studies:   I have personally reviewed pertinent imaging study reports and images in PACS  EKG, Pathology, and Other Studies:   I have personally reviewed pertinent reports    LAILA small linear echodensity on mitral valve

## 2018-09-25 NOTE — PHYSICAL THERAPY NOTE
60 minute treatment       09/25/18 1338   Pain Assessment   Pain Assessment No/denies pain   Pain Score No Pain   General   Chart Reviewed Yes   Response to Previous Treatment Patient with no complaints from previous session  Family/Caregiver Present No   Cognition   Overall Cognitive Status WFL   Following Commands Follows all commands and directions without difficulty   Transfers   Sit to Stand 6  Modified independent   Additional items Armrests; Increased time required   Stand to Sit 5  Supervision   Additional items Armrests; Increased time required   Stand pivot 6  Modified independent   Additional items (RW support)   Ambulation/Elevation   Gait pattern Decreased foot clearance; Forward Flexion   Gait Assistance 5  Supervision   Assistive Device Rolling walker   Distance 60' and 175'  10' x 2 w/o AD w/ min Ax 1  Stair Management Assistance 5  Supervision   Stair Management Technique Alternating pattern; Step to pattern; One rail L   Number of Stairs 2  (2 steps x 2)   Exercises   Hamstring Sets Prone;20 reps  (ham curls)   Hip Abduction Standing  (x 20 in parallel bars)   Hip Extension Standing;20 reps  (alternating hip ext in parallel bars)   Knee AROM (restorator x 7 minutes)   Ankle Pumps Standing  (heel raises x 20)   Marching Standing;20 reps  (no UE support)   Balance training  alternating step taps x 20   Assessment-Pt is doing well in PT  Gait is steady w/ RW  Unsteady w/o AD  No LOB  Tolerated treatment well     Prognosis Good   Problem List Decreased strength;Decreased endurance   Goals   Patient Goals "to get home walking, driving my car, and living in my home"   STG Expiration Date 09/29/18   LTG Expiration Date 09/29/18   Treatment Day 6   Plan   Treatment/Interventions (Continue per plan of care)   PT Frequency (6x/week)   Abby Cisse PTA

## 2018-09-25 NOTE — SOCIAL WORK
[address] Leger scheduled with patient's daughter  SW s/w daughter at length assisting with answering questions and concerns  59 Curtis Leger scheduled on Thursday 9/27 at 12:00  Team notified for attendance

## 2018-09-25 NOTE — CONSULTS
Consult - Urology   Naif Stuarto 6/21/1929, 80 y o  male MRN: [id number]    Unit/Bed#: [facility] 547-01 Encounter: [id number]    Benign prostatic hyperplasia with urinary retention   Assessment & Plan    High volume retention  On Flomax and Finasteride  >800cc PVR prior to most recent butt catheter placement  Hx of prostate CA s/p radiation [phone], per review of records  Bladder appearance with chronic outlet obstruction  Plan: Maintain butt catheter to gravity drainage until Cr nadirs + 7-10 days have passed, given high volume of retention  Can be done outpatient in Urology office or at rehab, depending on placement  Timing of butt removal would be 10/1-10/3/2018 or after  Continue Flomax and Finasteride  Discharge navigator updated with Urology office information for questions regarding voiding trial/butt instructions  Subjective/Objective     Subjective:   History is obtained from medicine notes, RN caring for patient, and patient  Per chart review, patient has a history of butt catheter x 3 weeks with recently failed voiding trial - had PVR of nearly 800 with subsequent replacement of butt yesterday  Care everywhere records from [facility] seen, but cannot download  This appears to be from 2012 - 2016  Retroperitoneal ultrasound from 8/14/2018 at [facility] reviewed - mild to moderate right hydronephrosis  Mild left hydronephrosis  Diffuse bladder wall trabeculation, consistent with chronic bladder outlet obstruction  Patient admitted with JAKE  Baseline Cr 1 1  Cr today 1 91  Patient reports he was having pressure and "pain"/"burning" with voiding, and was found to have high volume residual PVR, with subsequent placement of butt catheter  He reports suprapubic pressure and discomfort that was relieved with placement of catheter  He denies any bladder spasm or penile pain secondary to butt catheter   There has been no bleeding or discharge from the catheter since placement  He denies any flank pain or other complaints at this time  He is unsure about his  history - particularly his prostate cancer/radiation and the timeline of such  He doesn't think he has seen an outpatient Urologist in the recent past        Review of Systems   Constitutional: Negative for activity change and appetite change  HENT: Negative for congestion and ear pain  Eyes: Negative for pain  Respiratory: Negative for cough and shortness of breath  Cardiovascular: Negative for chest pain and palpitations  Gastrointestinal: Negative for abdominal distention, abdominal pain, blood in stool, constipation, diarrhea and nausea  Genitourinary: Positive for difficulty urinating (>800cc of retention  )  Negative for dysuria, penile pain, penile swelling, scrotal swelling and testicular pain  Musculoskeletal: Negative for arthralgias and myalgias  Skin: Negative for rash  Allergic/Immunologic: Negative for immunocompromised state  Neurological: Negative for dizziness and headaches  Hematological: Negative for adenopathy  Does not bruise/bleed easily  Psychiatric/Behavioral: Negative for agitation  The patient is not nervous/anxious  Objective:  Vitals: Blood pressure 118/50, pulse 93, temperature 97 6 °F (36 4 °C), temperature source Temporal, resp  rate 20, height 5' 11" (1 803 m), weight 66 kg (145 lb 8 1 oz), SpO2 95 %  ,Body mass index is 20 29 kg/m²  Intake/Output Summary (Last 24 hours) at 09/25/18 1311  Last data filed at 09/25/18 1253   Gross per 24 hour   Intake             2420 ml   Output             3400 ml   Net             -980 ml       Invasive Devices     Peripherally Inserted Central Catheter Line            PICC Line 09/11/18 14 days          Drain            Urethral Catheter Latex 24 Fr  less than 1 day                Physical Exam   Constitutional: He is oriented to person, place, and time  He appears well-developed and well-nourished   He is cooperative  He does not appear ill  No distress  Pleasant well appearing 80year old gentleman, OOB to chair  No acute distress  Reading the newspaper  HENT:   Head: Normocephalic and atraumatic  Moist mucous membranes  Eyes: Conjunctivae and EOM are normal    Neck: Normal range of motion  Neck supple  No tracheal deviation present  Cardiovascular: Normal rate, regular rhythm and normal heart sounds  No murmur heard  Pulmonary/Chest: Effort normal and breath sounds normal  No respiratory distress  He has no wheezes  Good airflow bilaterally on deep inspiration  Abdominal: Soft  Bowel sounds are normal  He exhibits no distension and no mass  There is no tenderness  Abdomen soft and benign without rigidity, rebound, guarding, no SP fullness  Genitourinary:   Genitourinary Comments: Uncircumcised phallus with butt catheter in place, draining clear yellow urine  Musculoskeletal: Normal range of motion  He exhibits no edema  Neurological: He is alert and oriented to person, place, and time  Skin: Skin is warm and dry  No rash noted  He is not diaphoretic  No erythema  No pallor  Psychiatric: He has a normal mood and affect  His behavior is normal  Judgment and thought content normal    Nursing note and vitals reviewed  History:    Past Medical History:   Diagnosis Date    Coronary artery disease     Diabetes mellitus ([facility] [address] )     History of transfusion     Hypertension     Renal disorder      Past Surgical History:   Procedure Laterality Date    HERNIA REPAIR       History reviewed  No pertinent family history    Social History     Social History    Marital status: Unknown     Spouse name: N/A    Number of children: N/A    Years of education: N/A     Social History Main Topics    Smoking status: Former Smoker     Packs/day: 1 00     Quit date: 9/19/1975    Smokeless tobacco: Never Used    Alcohol use No    Drug use: No    Sexual activity: No     Other Topics Concern    None     Social History Narrative    None       Imaging:  [facility] ultrasound reviewed from 8/14/18 - report only  No recent imaging       Labs:  Recent Labs      09/24/18   0523   WBC  6 50     Recent Labs      09/24/18   0523   HGB  8 6*       Recent Labs      09/24/18   0522  09/25/18   0507   CREATININE  2 36*  1 91*     Vijaya Faith PA-C  Date: 9/25/2018 Time: 1:11 PM

## 2018-09-25 NOTE — DISCHARGE INSTRUCTIONS
Endocarditis   WHAT YOU SHOULD KNOW:   Endocarditis is an infection of the tissue that lines the inside of your heart  Many times it also affects valves of your heart  Endocarditis, and the health problems it may cause, can be serious and can become life-threatening  INSTRUCTIONS:   Medicines: You may  need any of the following:  · Antibiotics  help treat or prevent a bacterial infection  · Blood pressure medicine  is given to decrease your blood pressure  · Heart medicine  is given to strengthen or regulate your heartbeat  · Diuretics  decrease excess fluid  You may urinate more often when you take this medicine  · Pain medicine  may be given  · Anticoagulants    are a type of blood thinner medicine that helps prevent clots  Clots can cause strokes, heart attacks, and death  These medicines may cause you to bleed or bruise more easily  ¨ Watch for bleeding from your gums or nose  Watch for blood in your urine and bowel movements  Use a soft washcloth and a soft toothbrush  If you shave, use an electric razor  Avoid activities that can cause bruising or bleeding  ¨ Tell your healthcare provider about all medicines you take because many medicines cannot be used with anticoagulants  Do not start or stop any medicines unless your healthcare provider tells you to  Tell your dentist and other healthcare providers that you take anticoagulants  Wear a bracelet or necklace that says you take this medicine  ¨ You will need regular blood tests so your healthcare provider can decide how much medicine you need  Take anticoagulants exactly as directed  Tell your healthcare provider right away if you forget to take the medicine, or if you take too much  ¨ If you take warfarin, some foods can change how your blood clots  Do not make major changes to your diet while you take warfarin  Warfarin works best when you eat about the same amount of vitamin K every day   Vitamin K is found in green leafy vegetables, broccoli, grapes, and other foods  Ask for more information about what to eat when you take warfarin  · Take your medicine as directed  Contact your healthcare provider if you think your medicine is not helping or if you have side effects  Tell him if you are allergic to any medicine  Keep a list of the medicines, vitamins, and herbs you take  Include the amounts, and when and why you take them  Bring the list or the pill bottles to follow-up visits  Carry your medicine list with you in case of an emergency  Follow up with your cardiologist as directed: You may need to return for more tests  Write down your questions so you remember to ask them during your visits  Self-care:   · Adjust your activities as directed  You may need to decrease your activities or exercise while you have symptoms  Ask your PHP about the best exercise plan for you  · Eat heart healthy foods  Foods that help protect the heart include fruits, vegetables, nuts, salmon, and canola and soybean oils  You may be told to eat foods low in cholesterol or sodium (salt) or high in fiber  You also may be told to limit saturated and trans fats  Ask your PHP or nutritionist for more information on a heart healthy diet  · Maintain a healthy weight  Ask your PHP how much you should weigh  Ask him to help you create a weight loss plan if you are overweight  · Do not smoke  If you smoke, it is never too late to quit  Ask for information if you need help quitting  Prevent endocarditis:   · Keep your teeth and gums healthy  Brush your teeth 2 to 3 times every day  It is best to brush your teeth after meals  Gently brush your teeth and gums with a clean toothbrush that has soft bristles  See your dentist for regular checkups  Always tell your dental caregivers that you have had endocarditis  · Ask your healthcare provider if you should take antibiotics before certain procedures    Some procedures may allow bacteria to get into your blood and travel to your heart  You may need medicine to prevent a bacterial infection  Contact your cardiologist if:   · You have a fever  · You lose your appetite or are unable to eat  · You have increased fatigue and weakness  · You have questions or concerns about your condition or care  Return to the emergency department if:   · You have any of the following signs of a heart attack:      ¨ Squeezing, pressure, or pain in your chest that lasts longer than 5 minutes or returns    ¨ Discomfort or pain in your back, neck, jaw, stomach, or arm     ¨ Trouble breathing    ¨ Nausea or vomiting    ¨ Lightheadedness or a sudden cold sweat, especially with chest pain or trouble breathing    · You have any of the following signs of a stroke:     ¨ Numbness or drooping on one side of your face     ¨ Weakness in an arm or leg    ¨ Confusion or difficulty speaking    ¨ Dizziness, a severe headache, or vision loss    · You have sudden trouble breathing or shortness of breath while lying down  · Your heart pounds or flutters, or your heart rate is faster than normal for you  · You have new or increased swelling in your feet or ankles  · You feel faint  © 2014 [address] is for End User's use only and may not be sold, redistributed or otherwise used for commercial purposes  All illustrations and images included in CareNotes® are the copyrighted property of [employer]  or [employer]  The above information is an  only  It is not intended as medical advice for individual conditions or treatments  Talk to your doctor, nurse or pharmacist before following any medical regimen to see if it is safe and effective for you  Simmons Catheter Placement and Care   WHAT YOU NEED TO KNOW:   A Simmons catheter is a sterile tube that is inserted into your bladder to drain urine  It is also called an indwelling urinary catheter   The tip of the catheter has a small balloon filled with solution that holds the catheter inside your bladder  DISCHARGE INSTRUCTIONS:   Return to the emergency department if:   · Your catheter comes out  · You suddenly have material that looks like sand in the tubing or drainage bag  · No urine is draining into the bag and you have checked the system  · You have pain in your hip, back, pelvis, or lower abdomen  · You are confused or cannot think clearly  Contact your healthcare provider if:   · You have a fever  · You have bladder spasms for more than 1 day after the catheter is placed  · You see blood in the tubing or drainage bag  · You have a rash or itching where the catheter tube is secured to your skin  · Urine leaks from or around the catheter, tubing, or drainage bag  · The closed drainage system has accidently come open or apart  · You see a layer of crystals inside the tubing  · You have questions or concerns about your condition or care  Care for your Simmons catheter:   · Clean your genital area 2 times every day  Clean your catheter and the area around where it was inserted  Use soap and water  Clean your anal opening and catheter area after every bowel movement  · Secure the catheter tube  so you do not pull or move the catheter  This helps prevent pain and bladder spasms  Healthcare providers will show you how to use medical tape or a strap to secure the catheter tube to your body  · Keep a closed drainage system  Your Simmons catheter should always be attached to the drainage bag to form a closed system  Do not disconnect any part of the closed system unless you need to change the bag  Care for your drainage bag:   · Ask if a leg bag is right for you  A leg bag can be worn under your clothes  Ask your healthcare provider for more information about a leg bag       · Keep the drainage bag below the level of your waist   This helps stop urine from moving back up the tubing and into your bladder  Do not loop or kink the tubing  This can cause urine to back up and collect in your bladder  Do not let the drainage bag touch or lie on the floor  · Empty the drainage bag when needed  The weight of a full drainage bag can be painful  Empty the drainage bag every 3 to 6 hours or when it is ? full  · Clean and change the drainage bag as directed  Ask your healthcare provider how often you should change the drainage bag and what cleaning solution to use  Wear disposable gloves when you change the bag  Do not allow the end of the catheter or tubing to touch anything  Clean the ends with an alcohol pad before you reconnect them  What to do if problems develop:   · No urine is draining into the bag:      ¨ Check for kinks in the tubing and straighten them out  ¨ Check the tape or strap used to secure the catheter tube to your skin  Make sure it is not blocking the tube  ¨ Make sure you are not sitting or lying on the tubing  ¨ Make sure the urine bag is hanging below the level of your waist     · Urine leaks from or around the catheter, tubing, or drainage bag:  Check if the closed drainage system has accidently come open or apart  Clean the catheter and tubing ends with a new alcohol pad and reconnect them  Prevent an infection:   · Wash your hands often  Wash before and after you touch your catheter, tubing, or drainage bag  Use soap and water  Wear clean disposable gloves when you care for your catheter or disconnect the drainage bag  Wash your hands before you prepare or eat food  · Drink liquids as directed  Ask your healthcare provider how much liquid to drink each day and which liquids are best for you  Liquids will help flush your kidneys and bladder to help prevent infection  Follow up with your healthcare provider as directed:  Write down your questions so you remember to ask them during your visits     © 2017 [employer] Information is for End User's use only and may not be sold, redistributed or otherwise used for commercial purposes  All illustrations and images included in CareNotes® are the copyrighted property of [employer]  or [employer]  The above information is an  only  It is not intended as medical advice for individual conditions or treatments  Talk to your doctor, nurse or pharmacist before following any medical regimen to see if it is safe and effective for you  Ubtt Cath Care instructions---Maintain butt catheter to straight drainage Until 10/1-10/3/2018  May use leg bag and shower  May flush TID prn using Halie syringe and 120 ml NSS  May use more saline ad anne marie to prevent/treat cath obstruction  Remove catheter on 8th day rehab by 0600 hrs  Bladder scan if no void or less than 200ml in 6hrs  Straight cath for bladder scan >350ml and repeat process  If patient requires straight cath x3 , re-insert butt and call for Urologist follow-up  Information above  Butt can remain in place for up to 4 weeks at a time  Butt placed at [facility] on 9/24/2017

## 2018-09-25 NOTE — PROGRESS NOTES
Renal function improving, but still not back to baseline  No s/s of fluid overload  Lungs clear  No SOB  Trace LE edema  Will keep NSS at 100/hr for now and repeat BMP in AM   Maintain butt at this time

## 2018-09-25 NOTE — CASE MANAGEMENT
Continued Stay Review    Date: 9/25/2018    Vital Signs: /50 (BP Location: Right arm)   Pulse 93   Temp 97 6 °F (36 4 °C) (Temporal)   Resp 20   Ht 5' 11" (1 803 m)   Wt 66 kg (145 lb 8 1 oz)   SpO2 95%   BMI 20 29 kg/m²     Medications:   Scheduled Meds:   Current Facility-Administered Medications:  acetaminophen 650 mg Oral Q6H PRN Roberto Gracia MD    amLODIPine 5 mg Oral Daily Denise Tao MD    ampicillin 2,000 mg Intravenous Q6H Roberto Gracia MD Last Rate: 2,000 mg (09/25/18 0932)   atorvastatin 40 mg Oral Daily With Swetha Landrum MD    benzonatate 200 mg Oral TID PRN Roberto rGacia MD    cefTRIAXone 2,000 mg Intravenous Q12H Roberto Gracia MD Last Rate: 2,000 mg (09/25/18 0929)   cholecalciferol 1,000 Units Oral Daily Roberto Gracia MD    docusate sodium 100 mg Oral BID Roberto Gracia MD    finasteride 5 mg Oral Daily Roberto Gracia MD    fluticasone 1 spray Nasal Daily Roberto Gracia MD    glipiZIDE 2 5 mg Oral BID AC Denise Tao MD    metoprolol succinate 50 mg Oral Daily Roberto Gracia MD    nitroglycerin 0 4 mg Sublingual Q5 Min PRN Roberto Gracia MD    nystatin  Topical BID Denise Tao MD    senna 1 tablet Oral Daily Roberto Gracia MD    sitaGLIPtin 100 mg Oral Daily Roberto Gracia MD    sodium chloride 100 mL/hr Intravenous Continuous Denise Tao MD Last Rate: 100 mL/hr (09/25/18 0932)   tamsulosin 0 4 mg Oral Daily With Dinner DREAD Cervantes    tiotropium 18 mcg Inhalation Daily PRN Jose M Duran MD    tuberculin 5 Units Intradermal PRN Jose M Duran MD      Continuous Infusions:   sodium chloride 100 mL/hr Last Rate: 100 mL/hr (09/25/18 0932)       PT NOTES 9/24/18:    Goals   Patient Goals "To get back to the way I was"  STG Expiration Date (STGs = LTGs)   Short Term Goal #1 STGs = LTGs   LTG Expiration Date 09/29/18   Long Term Goal #1 1   Patient will perform all bed mobility with modified independence in order to get in/out of bed  2  Patient will perform all functional transfers with modified independence in order to facilitate transfers from one surface to another  3  To improve bilateral hip flexion strength form 4-/5 to at least 4/5 to improve stability in gait  4  Patient will ambulate with modified independence x at least 200 ft with least restrictive assistive device in order to safely access all necessary areas of home and to enable walking within the home to complete activities of daily living  5  Patient will ascend/descend 2 steps with bilateral handrails with device prn with supervision to enter/exit home  Jordon Perez PTA Physical Therapist Assistant Signed   Physical Therapy Note Date of Service: 9/24/2018  9:50 AM         []Hide copied text  []Hover for attribution information  34' session       09/24/18 0941   Pain Assessment   Pain Assessment No/denies pain   General   Chart Reviewed Yes   Family/Caregiver Present No   Cognition   Overall Cognitive Status [facility]   Arousal/Participation Alert; Cooperative   Attention Within functional limits   Following Commands Follows all commands and directions without difficulty   Subjective   Subjective had lose bowels yesterday- I feel  a little weak to day - also reports burning with urination- nurse made aware   Transfers   Sit to Stand 6  Modified independent   Stand to Sit 6  Modified independent   Stand pivot 6  Modified independent   Toilet transfer 6  Modified independent   Additional items (manages clothsing hygiene and hand washing with no unstreadi)   Ambulation/Elevation   Gait pattern Forward Flexion   Gait Assistance 5  Supervision   Assistive Device Rolling walker   Distance 927',68, 75'  (10' x 2 with no AD and close S )   Stair Management Assistance (close S - 2 hands on L rail , step to )   Number of Stairs (2 x 2)   Balance   Dynamic Standing Fair -   Ambulatory Fair   Activity Tolerance   Activity Tolerance Patient tolerated treatment well Exercises   Knee AROM Long Arc Quad (2# 10 x 2)   Marching (alternating step taps 10x 2 with 2# wts and S )   Assessment   Prognosis Good   Problem List Decreased strength;Decreased endurance   Assessment Pt reports feeling weak to day after frequent loose BMs yesterday  Pt also reporting burning with urination- nursing made aware  Pt reports wanting to use walker for amb today  Noted weakness on R LE with step taps and with LAQs   Goals   Patient Goals anything you decide   STG Expiration Date 09/29/18   LTG Expiration Date 09/29/18   Treatment Day 5   Plan   Treatment/Interventions (cont as per POC)   Progress Progressing toward goals                  OT  NOTES 9/24/18: Pt will achieve the following goals in 1 5 weeks    UB ADL- (I)   LB ADL- MI/I   Toileting- MI/I with hygiene and clothing management    Bed Mobility- MI/I with bed flat and no SR to prep for purposeful tasks   ADL Txfs- MI using most appropriate method for ADLs   Stand Balance- F+ dynamic & unsupported for clothing management    Stand Tolerance- 5-7 mins for showering   Tub/Shower- MI using most appropriate method; educate on AE   Meal Prep- MI with good safety    Home Management (laundry & bed making)- MI without LOB    Item Retrieval- MI with good safety; educate on AE PRN    Activity Tolerance- G- for ADLs   Pt will participate in [address] [place] [address] assessment to determine level of safety for retuning home   Pt will achieve 4+/5 UE strength for ADL txfs         OT total treatment time:33 min     Additional goals & Comments: Pt treated in room 2nd urgency of BM earlier  Pt pleasant and cooperative, eager to visit with family whom were waiting in sunroom  All needs in reach, alarm in place end of tx        09/24/18 1408   Restrictions/Precautions   Weight Bearing Precautions Per Order No   Other Precautions Bed Alarm; Chair Alarm;Cognitive   Lifestyle   Reciprocal Relationships (Family)   Pain Assessment   Pain Assessment No/denies pain   ADL   Toileting Assistance  (Pt had just completed large, loose BM prior to IVEY entering)   Toileting Deficit (CNA had assisted pt w/ hyygiene)   Functional Standing Tolerance   Time (7min)   Activity (static stand)   Transfers   Sit to Stand 6  Modified independent   Additional items Armrests   Stand to Sit 6  Modified independent   Additional items Armrests   Stand pivot 5  Supervision   Additional items (RW)   Additional Comments F Dyn, F+ static   Therapeutic Exercise - ROM   UE-ROM (Restorator 8 min)   Therapeutic Excerise-Strength   UE Strength (30 curls, 2x10 chest press, Abd/ Add)   Cognition   Overall Cognitive Status WFL   Arousal/Participation Alert; Cooperative   Attention Within functional limits   Orientation Level Oriented to person;Oriented to time;Oriented to situation   Following Commands Follows all commands and directions without difficulty   Activity Tolerance   Activity Tolerance Patient tolerated treatment well   Assessment   Assessment Patient participated in Skilled OT session this date with interventions consisting of therapeutic exercise to: increase functional use of BUEs, increase BUE muscle strength ,  therapeutic activities to: increase activity tolerance and increase standing tolerance time with unilateral UE support to complete sink level ADLs   Patient agreeable to OT treatment session, upon arrival patient was found exiting BR w/ CNA  Patient requiring   Patient continues to be functioning below baseline level, occupational performance remains limited secondary to factors listed above and increased risk for falls and injury  From OT standpoint, recommendation at time of d/c would be Home OT w/ Family support pending progress  Patient to benefit from continued Occupational Therapy treatment while in the hospital to address deficits as defined above and maximize level of functional independence with ADLs and functional mobility      Plan   Goal Expiration Date 09/28/18   Treatment Day 5   OT Frequency (5)   Recommendation   OT Discharge Recommendation Home OT   OT - OK to Discharge No      LONI Elder         PRN Meds:  None        Labs/Diagnostic Results:  On 9/25  BUN elevated at 43 and creatine 1 91  Calcium 7 6 , blood glucose 46  Potassium 5 7 on 9/24    Age/Sex: 80 y o  male     Assessment/Plan:     Remains On IV ABX till 9/26 for endocarditis  Has developed  acute hyperkalemia:  Given a dose of Kayexalate  Hold potassium supplements losartan and Lasix for now  Placed on gentle hydration and Simmons catheter has been placed as he seems like he has urinary retention  Patient's has acute kidney injury secondary to acute urinary retention obstructive uropathy  He has history of mild to moderate hydronephrosis in the past as well  He again has acute urinary retention with postvoid bladder residual of 789  Will place a Simmons catheter 5  Also place nystatin cream for fungal infection around the penile urethra     Will probably need a urology consult and do not do a voiding trial    Repeat daily BMPs until renal function is back to normal         Discharge Plan:   Prior to admission, patient lived alone in a [address] [place] [address] with first floor set up and 2 [address]  Patient is retired and independent with ADLs  Prior to admission patient still drove  Patient reports he does not own any DME  Awaiting for patients daughter to get back  To  to schedule a [facility] to discuss further discharge planning          Preston Hart RN  [phone]

## 2018-09-25 NOTE — OCCUPATIONAL THERAPY NOTE
OT TX (60 mins): Upon entry pt sitting in chair  Educated pt on being due for progress update today; agreed  Denies pain  SPO2 98% RA HR 91  BP seated 142/80 HR 88  Completed MOCA assessment  Pt scored 21/30 which indicates mild safety concerns for being alone  B/L UE MMT: 4+/5 t/o except L shld  Improvement noted compared to initial eval      Sit to stand MI with good hand placement  SPT to EOB with (S) using RW; (D) butt management  F dynamic stand balance  Stand to sit MI with controlled descent  Sit to supine MI with bed flat and no SR  Unable to get up without SR  Educated pt on not having at home  Therefore, continue to practice WITHOUT SR and focus on trunk strengthening  Pt states "that surprises me that I can't do that " Supine to sit MI using OT as simulated SR  SPT back to chair with (S) using RW and decreased safety stepping outside RW  (D) butt management  Performed trunk flexion x 10 reps with thera ball to increase trunk strength for bed mobility  Pt noted to be SOB; SPO2 97% RA HR 80  Completed chair push ups x 10 reps for 2 sets  Pt states "wow I can really feel that " OT reviewed all goals as per POC and slow progress thus far  Pt states "I'm doing good but need more " Reports his main challenge is his balance and his goal is "to get stronger and walk steadier " Fair+ activity tolerance  Remains in chair with all needs and alarm intact at end of session  PROGRESS NOTE    Pt will achieve the following goals in 1 5 weeks    UB ADL- (I)  CURRENT- (S)   LB ADL- MI/I CURRENT- MIN > (S)    Toileting- MI/I with hygiene and clothing management  CURRENT- (S); CURRENTLY HAS [place]    Bed Mobility- MI/I with bed flat and no SR to prep for purposeful tasks  CURRENT- MI SIT > SUPINE & MI SUPINE > SIT USING SR  UNABLE WITHOUT SR- DOESN'T HAVE AT HOME  CONTINUE      ADL Txfs- MI using most appropriate method for ADLs  CURRENT- (S) USING RW    Stand Balance- F+ dynamic & unsupported for clothing management  CURRENT- F    Stand Tolerance- 5-7 mins for showering  MET   Tub/Shower- MI using most appropriate method; educate on AE  CURRENT- (S) USING BENCH    Meal Prep- MI with good safety  NT THUS FAR    Home Management (laundry & bed making)- MI without LOB CURRENT- S> MI FOR BED MAKING   Item Retrieval- MI with good safety; educate on AE PRN    Activity Tolerance- G- for ADLs  CURRENT- FAIR/FAIR+   Pt will participate in [address] [place] [address] assessment to determine level of safety for retuning home  MET- SCORED 21/30, WHICH INDICATES MILD SAFETY CONCERNS FOR BEING ALONE  Pt will achieve 4+/5 UE strength for ADL txfs CURRENT- MET EXCEPT L SHLD     Pt making slow progress toward goals  Participated in 6/6 OT sessions  Pt achieved 2/14 goals as per POC  Hindered by decreased cognition, safety, and stand balance  Completed MOCA assessment today and pt scored 21/30 which indicates mild safety concerns for being home alone  Recent c/o dribbling and pain at penis with redness  Simmons placed and cream ordered  In addition, pt with elevated BUN levels  Remains below functional baseline with deconditioning  Initial POC expires 9/28/18  However, not many gains made toward goals  Therefore, extended OT POC warranted  Extend OT POC another 1 5 weeks to 10/5/18 at 6x/wk  POC remains appropriate  Please initiate trial of meal prep, bed mobility without SR, trunk strengthening, and toileting tasks  Recommend meeting with family to discuss safe discharge plans as pt is not safe to return home alone at this time  Increased supervision needed for overall safety       [employer], OT

## 2018-09-25 NOTE — PLAN OF CARE
Problem: OCCUPATIONAL THERAPY ADULT  Goal: Performs self-care activities at highest level of function for planned discharge setting  See evaluation for individualized goals  Treatment Interventions: ADL retraining, Functional transfer training, UE strengthening/ROM, Endurance training, Patient/family training, Cognitive reorientation, Equipment evaluation/education, Neuromuscular reeducation, Activityengagement  Equipment Recommended:  (TBD)       See flowsheet documentation for full assessment, interventions and recommendations  Outcome: Progressing  Limitation: Decreased ADL status, Decreased UE strength, Decreased cognition, Decreased endurance, Decreased self-care trans, Decreased high-level ADLs  Prognosis: Good     OT Discharge Recommendation: Home OT  OT - OK to Discharge: No       OT TX (60 mins): Upon entry pt sitting in chair  Educated pt on being due for progress update today; agreed  Denies pain  SPO2 98% RA HR 91  BP seated 142/80 HR 88  Completed MOCA assessment  Pt scored 21/30 which indicates mild safety concerns for being alone  B/L UE MMT: 4+/5 t/o except L shld  Improvement noted compared to initial eval      Sit to stand MI with good hand placement  SPT to EOB with (S) using RW; (D) butt management  F dynamic stand balance  Stand to sit MI with controlled descent  Sit to supine MI with bed flat and no SR  Unable to get up without SR  Educated pt on not having at home  Therefore, continue to practice WITHOUT SR and focus on trunk strengthening  Pt states "that surprises me that I can't do that " Supine to sit MI using OT as simulated SR  SPT back to chair with (S) using RW and decreased safety stepping outside RW  (D) butt management  Performed trunk flexion x 10 reps with thera ball to increase trunk strength for bed mobility  Pt noted to be SOB; SPO2 97% RA HR 80  Completed chair push ups x 10 reps for 2 sets   Pt states "wow I can really feel that " OT reviewed all goals as per POC and slow progress thus far  Pt states "I'm doing good but need more " Reports his main challenge is his balance and his goal is "to get stronger and walk steadier " Fair+ activity tolerance  Remains in chair with all needs and alarm intact at end of session  PROGRESS NOTE    Pt will achieve the following goals in 1 5 weeks    UB ADL- (I)  CURRENT- (S)   LB ADL- MI/I CURRENT- MIN > (S)    Toileting- MI/I with hygiene and clothing management  CURRENT- (S); CURRENTLY HAS RIOJAS    Bed Mobility- MI/I with bed flat and no SR to prep for purposeful tasks  CURRENT- MI SIT > SUPINE & MI SUPINE > SIT USING SR  UNABLE WITHOUT SR- DOESN'T HAVE AT HOME  CONTINUE  ADL Txfs- MI using most appropriate method for ADLs  CURRENT- (S) USING RW    Stand Balance- F+ dynamic & unsupported for clothing management  CURRENT- F    Stand Tolerance- 5-7 mins for showering  MET   Tub/Shower- MI using most appropriate method; educate on AE  CURRENT- (S) USING BENCH    Meal Prep- MI with good safety  NT THUS FAR    Home Management (laundry & bed making)- MI without LOB CURRENT- S> MI FOR BED MAKING   Item Retrieval- MI with good safety; educate on AE PRN    Activity Tolerance- G- for ADLs  CURRENT- FAIR/FAIR+   Pt will participate in [address] [place] [address] assessment to determine level of safety for retuning home  MET- SCORED 21/30, WHICH INDICATES MILD SAFETY CONCERNS FOR BEING ALONE  Pt will achieve 4+/5 UE strength for ADL txfs CURRENT- MET EXCEPT L SHLD     Pt making slow progress toward goals  Participated in 6/6 OT sessions  Pt achieved 2/14 goals as per POC  Hindered by decreased cognition, safety, and stand balance  Completed MOCA assessment today and pt scored 21/30 which indicates mild safety concerns for being home alone  Recent c/o dribbling and pain at penis with redness  Riojas placed and cream ordered  In addition, pt with elevated BUN levels  Remains below functional baseline with deconditioning  Initial POC expires 9/28/18   However, not many gains made toward goals  Therefore, extended OT POC warranted  Extend OT POC another 1 5 weeks to 10/5/18 at 6x/wk  POC remains appropriate  Please initiate trial of meal prep, bed mobility without SR, trunk strengthening, and toileting tasks  Recommend meeting with family to discuss safe discharge plans as pt is not safe to return home alone at this time  Increased supervision needed for overall safety       Domingo Clark OT

## 2018-09-25 NOTE — PLAN OF CARE
Problem: PHYSICAL THERAPY ADULT  Goal: Performs mobility at highest level of function for planned discharge setting  See evaluation for individualized goals  Treatment/Interventions: ADL retraining, Functional transfer training, LE strengthening/ROM, Elevations, Therapeutic exercise, Endurance training, Cognitive reorientation, Patient/family training, Equipment eval/education, Bed mobility, Gait training, Spoke to nursing, OT, Family, Spoke to case management (Speak to )  Equipment Recommended: Other (Comment) (To be determined)       See flowsheet documentation for full assessment, interventions and recommendations  Outcome: Progressing  Prognosis: Good  Problem List: Decreased strength, Decreased endurance  Assessment: Pt reports feeling weak to day after frequent loose BMs yesterday  Pt also reporting burning with urination- nursing made aware  Pt reports wanting to use walker for amb today  Noted weakness on R LE with step taps and with LAQs  Barriers to Discharge: [address] home environment, Decreased caregiver support, Other (Comment) (Lives alone)                See flowsheet documentation for full assessment

## 2018-09-25 NOTE — ASSESSMENT & PLAN NOTE
High volume retention  On Flomax and Finasteride  >800cc PVR prior to most recent butt catheter placement  Hx of prostate CA s/p radiation [id number], per review of records  Bladder appearance with chronic outlet obstruction  Plan: Maintain butt catheter to gravity drainage until Cr nadirs + 7-10 days have passed, given high volume of retention  Can be done outpatient in Urology office or at rehab, depending on placement  Timing of butt removal would be 10/1-10/3/2018 or after  Continue Flomax and Finasteride  Discharge navigator updated with Urology office information for questions regarding voiding trial/butt instructions

## 2018-09-25 NOTE — ASSESSMENT & PLAN NOTE
Continue BB Lasix 20mg daily starting tomorrow  Monitor volume status,blood pressure  Weight down 4lbs since admission

## 2018-09-26 LAB
ANION GAP SERPL CALCULATED.3IONS-SCNC: 7 MMOL/L (ref 5–14)
BUN SERPL-MCNC: 37 MG/DL (ref 5–25)
CALCIUM SERPL-MCNC: 7.8 MG/DL (ref 8.4–10.2)
CHLORIDE SERPL-SCNC: 106 MMOL/L (ref 97–108)
CO2 SERPL-SCNC: 26 MMOL/L (ref 22–30)
CREAT SERPL-MCNC: 1.59 MG/DL (ref 0.7–1.5)
GFR SERPL CREATININE-BSD FRML MDRD: 38 ML/MIN/1.73SQ M
GLUCOSE SERPL-MCNC: 72 MG/DL (ref 70–99)
POTASSIUM SERPL-SCNC: 4.6 MMOL/L (ref 3.6–5)
SODIUM SERPL-SCNC: 139 MMOL/L (ref 137–147)

## 2018-09-26 PROCEDURE — 97535 SELF CARE MNGMENT TRAINING: CPT

## 2018-09-26 PROCEDURE — 97530 THERAPEUTIC ACTIVITIES: CPT

## 2018-09-26 PROCEDURE — 99232 SBSQ HOSP IP/OBS MODERATE 35: CPT | Performed by: INTERNAL MEDICINE

## 2018-09-26 PROCEDURE — 80048 BASIC METABOLIC PNL TOTAL CA: CPT | Performed by: NURSE PRACTITIONER

## 2018-09-26 PROCEDURE — 97116 GAIT TRAINING THERAPY: CPT

## 2018-09-26 RX ORDER — SACCHAROMYCES BOULARDII 250 MG
250 CAPSULE ORAL 2 TIMES DAILY
Status: DISCONTINUED | OUTPATIENT
Start: 2018-09-26 | End: 2018-10-04 | Stop reason: HOSPADM

## 2018-09-26 RX ADMIN — NYSTATIN: 100000 CREAM TOPICAL at 09:05

## 2018-09-26 RX ADMIN — GLIPIZIDE 2.5 MG: 5 TABLET ORAL at 06:39

## 2018-09-26 RX ADMIN — TAMSULOSIN HYDROCHLORIDE 0.4 MG: 0.4 CAPSULE ORAL at 17:39

## 2018-09-26 RX ADMIN — ATORVASTATIN CALCIUM 40 MG: 40 TABLET, FILM COATED ORAL at 17:32

## 2018-09-26 RX ADMIN — SENNOSIDES 8.6 MG: 8.6 TABLET, FILM COATED ORAL at 09:06

## 2018-09-26 RX ADMIN — SODIUM CHLORIDE 100 ML/HR: 900 INJECTION INTRAVENOUS at 01:14

## 2018-09-26 RX ADMIN — FINASTERIDE 5 MG: 5 TABLET, FILM COATED ORAL at 09:06

## 2018-09-26 RX ADMIN — DOCUSATE SODIUM 100 MG: 100 CAPSULE, LIQUID FILLED ORAL at 09:06

## 2018-09-26 RX ADMIN — GLIPIZIDE 2.5 MG: 5 TABLET ORAL at 17:32

## 2018-09-26 RX ADMIN — Medication 250 MG: at 17:33

## 2018-09-26 RX ADMIN — CEFTRIAXONE 2000 MG: 2 INJECTION, SOLUTION INTRAVENOUS at 09:07

## 2018-09-26 RX ADMIN — NYSTATIN: 100000 CREAM TOPICAL at 17:34

## 2018-09-26 RX ADMIN — AMPICILLIN SODIUM 2000 MG: 2 INJECTION, POWDER, FOR SOLUTION INTRAMUSCULAR; INTRAVENOUS at 01:14

## 2018-09-26 RX ADMIN — AMPICILLIN SODIUM 2000 MG: 2 INJECTION, POWDER, FOR SOLUTION INTRAMUSCULAR; INTRAVENOUS at 17:32

## 2018-09-26 RX ADMIN — SITAGLIPTIN 100 MG: 100 TABLET, FILM COATED ORAL at 09:06

## 2018-09-26 RX ADMIN — CEFTRIAXONE 2000 MG: 2 INJECTION, SOLUTION INTRAVENOUS at 20:07

## 2018-09-26 RX ADMIN — VITAMIN D, TAB 1000IU (100/BT) 1000 UNITS: 25 TAB at 09:06

## 2018-09-26 RX ADMIN — FLUTICASONE PROPIONATE 1 SPRAY: 50 SPRAY, METERED NASAL at 09:07

## 2018-09-26 RX ADMIN — AMPICILLIN SODIUM 2000 MG: 2 INJECTION, POWDER, FOR SOLUTION INTRAMUSCULAR; INTRAVENOUS at 09:06

## 2018-09-26 RX ADMIN — METOPROLOL SUCCINATE 50 MG: 50 TABLET, EXTENDED RELEASE ORAL at 09:06

## 2018-09-26 RX ADMIN — DOCUSATE SODIUM 100 MG: 100 CAPSULE, LIQUID FILLED ORAL at 17:35

## 2018-09-26 NOTE — PLAN OF CARE
Problem: PHYSICAL THERAPY ADULT  Goal: Performs mobility at highest level of function for planned discharge setting  See evaluation for individualized goals  Treatment/Interventions: ADL retraining, Functional transfer training, LE strengthening/ROM, Elevations, Therapeutic exercise, Endurance training, Cognitive reorientation, Patient/family training, Equipment eval/education, Bed mobility, Gait training, Spoke to nursing, OT, Family, Spoke to case management (Speak to )  Equipment Recommended: Other (Comment) (To be determined)       See flowsheet documentation for full assessment, interventions and recommendations  Outcome: Progressing  Prognosis: Good  Problem List: Decreased strength, Decreased endurance, Impaired balance, Decreased mobility, Decreased coordination, Impaired vision, Impaired hearing (Wears reading glasses, leg bag, LUE IV)  Assessment: Pt attempted to initially perform bed mobility without log roll technique;  but had  increased discomfort in low back  Practiced multiple supine <-> sit transfers with cues for log roll technique  Also practiced SPT's with and without RW  Pt appears much steadier and safer using RW at this time  Educated pt on benefits of Home PT and use of RW to increase stability and activity tolerance  Barriers to Discharge: Inaccessible home environment, Decreased caregiver support (Lives alone)                See flowsheet documentation for full assessment

## 2018-09-26 NOTE — OCCUPATIONAL THERAPY NOTE
Occupational Therapy Treatment Note    Name:  He Cohen   MRN:   [id number]  Age:     80 y o  Patient Active Problem List   Diagnosis    Endocarditis of mitral valve    Status post non-ST elevation myocardial infarction (NSTEMI)    Candida esophagitis (HCC)    Chronic diastolic (congestive) heart failure (HCC)    Type 2 diabetes mellitus without complication, without long-term current use of insulin (HCC)    Essential hypertension    Benign prostatic hyperplasia with urinary retention    CKD (chronic kidney disease), stage III    Atrial fibrillation (HCC)    Anemia    Coronary artery disease involving native coronary artery    JAKE (acute kidney injury) ([facility] [address] )     Endocarditis [I38]      Subjective/Goals: " I feel pretty good"    Vitals:140/82 HR 92 AUGUST 97%    OT total treatment time: 66 min    Additional goals & Comments: Pt pleasant and cooperative this day, encouraged EC 2nd appeared SOB at times, pt denied  09/26/18 0949   Restrictions/Precautions   Weight Bearing Precautions Per Order No   Other Precautions Bed Alarm; Chair Alarm;Cognitive   Lifestyle   Reciprocal Relationships Family   Pain Assessment   Pain Assessment No/denies pain   ADL   Where Assessed Standing at sink   Grooming Deficit Supervision/safety   Grooming Comments (Oral care at sink)   UB Bathing Assistance 5  Supervision/Setup   LB Bathing Assistance 5  Supervision/Setup   LB Bathing Deficit (for song/ buttocks,MI for LE's and feet)   UB Dressing Assistance 5  Supervision/Setup   UB Dressing Deficit (S in standing, ( I ) seated)   LB Dressing Assistance (S to pull clothing down, Min A to don boxers/ sweats)   LB Dressing Deficit (MI for jobes and shoes)   LB Dressing Comments (D to thread butt through pants)   Toileting Assistance  7  Independent   Toileting Deficit (BM hygiene S for CM)   Light Housekeeping   Light Housekeeping Level (S w/ basket collect/ retrieve clothing transport w/ basket)   Light Housekeeping Level of Assistance (F safety, difficulty cueing 2nd decreased hearing)   Functional Standing Tolerance   Time (4 min)   Activity (ADL)   Transfers   Sit to Stand 6  Modified independent   Additional items Armrests   Stand to Sit 5  Supervision   Additional items Armrests; Verbal cues   Stand pivot 5  Supervision   Additional items (w/ RW v/c's to keep flat on floor)   Additional Comments F Dyn,F+ static   Functional Mobility   Functional Mobility 5  Supervision   Additional items Rolling walker   Toilet Transfers   Toilet Transfers Modified independence   Toilet Transfers Comments (Sit >< Stand)   Cognition   Overall Cognitive Status [facility]   Arousal/Participation Alert; Cooperative   Attention Within functional limits   Memory (slight confusion w/ re: catheter )   Activity Tolerance   Activity Tolerance Patient tolerated treatment well   Assessment   Assessment Patient participated in Skilled OT session this date with interventions consisting of ADL re training with the use of correct body mechnaics, Energy Conservation techniques, safety awareness and fall prevention techniques and increase dynamic sit/ stand balance during functional activity    Patient agreeable to OT treatment session, upon arrival patient was found seated OOB to Chair  Patient requiring ocassional safety reminders  Patient continues to be functioning below baseline level, occupational performance remains limited secondary to factors listed above and increased risk for falls and injury  From OT standpoint, recommendation at time of d/c would be Home OT and family support, possibly increased safety checks pending progress  Patient to benefit from continued Occupational Therapy treatment while on TCF to address deficits as defined above and maximize level of functional independence with ADLs and functional mobility      Plan   Goal Expiration Date (extended to 10/5)   Treatment Day (7)   OT Frequency (6x/wk)   Recommendation   OT Discharge Recommendation Home OT   OT - OK to Discharge No     Payam Rob, [address]

## 2018-09-26 NOTE — PHYSICAL THERAPY NOTE
Physical Therapy Daily Treatment Note       09/26/18 PT Treatment Session: 60 minutes ( 11:24 to 12:24)   Pain Assessment   Pain Assessment No/denies pain   Pain Score No Pain   Restrictions/Precautions   Weight Bearing Precautions Per Order No   Other Precautions Fall Risk; Chair Alarm; Bed Alarm;Hard of hearing;Visual impairment   General   Chart Reviewed Yes   Response to Previous Treatment Patient with no complaints from previous session  Family/Caregiver Present No   Cognition   Overall Cognitive Status WFL   Arousal/Participation Alert; Cooperative   Attention Within functional limits   Orientation Level Oriented X4   Memory Within functional limits   Following Commands Follows all commands and directions without difficulty   Subjective   Subjective (" I don't go up to the second floor of my home")   Bed Mobility   Supine to Sit 5  Supervision   Additional items Increased time required  (Cues for log roll)   Sit to Supine 5  Supervision   Additional items Increased time required  (Cues for log roll)   Additional Comments (Bed Mob: HOB flat, no rail)   Transfers   Sit to Stand 6  Modified independent   Additional items (Cues for hand placement)   Stand to Sit 6  Modified independent   Additional items (Good hand placement; + controlled descent)   Stand pivot 5  Supervision   Additional items Increased time required  (SPT with and without RW (steadier with RW); cues for RW man')   Additional items (Transfers: unsecured chair(s), EOB)   Additional Comments (This PT managed IV pole during transfers and gait)   Ambulation/Elevation   Gait pattern Decreased foot clearance;Narrow JENNIFER; Forward Flexion  (Cues to stay closer to RW)   Additional items (Amb with a RW for 78 feet; then 326 feet with Supervision )   Stair Management Technique (Reciprocle ( ascention) and non-reciprocle (descention))   Number of Stairs 2   Balance   Static Sitting (Good+)   Dynamic Sitting (Good-)   Static Standing (Fair/Fair+ with RW) Dynamic Standing (Fair with RW)   Ambulatory ([email or web address] with RW)   Endurance Deficit   Endurance Deficit Yes   Endurance Deficit Description (Decreased endurance for activity; provided with rest breaks )   Assessment   Prognosis Good   Problem List Decreased strength;Decreased endurance; Impaired balance;Decreased mobility; Decreased coordination; Impaired vision; Impaired hearing  (Wears reading glasses, leg bag, LUE IV)   Assessment Pt attempted to initially perform bed mobility without log roll technique;  but had  increased discomfort in low back  Practiced multiple supine <-> sit transfers;  with cues for log roll technique  Also practiced SPT's with and without RW  Pt appears much steadier and safer using RW at this time  Educated pt on benefits of Home PT and use of RW to increase stability and activity tolerance  Barriers to Discharge Inaccessible home environment;Decreased caregiver support  (Lives alone)   Goals   Patient Goals ("Get back to doing what I need to do to get back home")   STG Expiration Date 09/29/18   LTG Expiration Date 09/29/18   Treatment Day 7   Plan   Treatment/Interventions ADL retraining;Functional transfer training;LE strengthening/ROM; Elevations; Therapeutic exercise; Endurance training;Cognitive reorientation;Patient/family training;Equipment eval/education; Bed mobility;Gait training; Compensatory technique education;Spoke to MD;Spoke to nursing;Spoke to case management;Spoke to advanced practitioner;OT;Family   Progress Progressing toward goals   PT Frequency (6x/week)   Recommendation   Equipment Recommended (RW)     Vitals:   Seated in bedside chair at rest: SPO2 (RA) 98%, HR 78  After ambulating with a RW for 336 feet (seated): /71, SPO2 (RA) 99%, HR 80     Akhil Pretzel, PT

## 2018-09-26 NOTE — PROGRESS NOTES
Progress Note - Infectious Disease   José Luis Gram 80 y o  male MRN: [id number]  Unit/Bed#: -01 Encounter: [id number]      Impression/Recommendations:  1  Enterococcal MV endocarditis  Diagnosed at [facility]  Unclear source  CT A/P showed bladder thickening with hydro suggesting ?  source  No urine cultures done  Persistent bacteremia which finally cleared  Small vegetation seen on LAILA  Now status post 6 weeks ampicillin slap ceftriaxone  Rec:  ? Discontinue antibiotics today to complete treatment course  ? Check repeat blood cultures in 2 weeks  ? Discontinue PICC line in a m      2  BPH with urinary retention  Chronic issue based on chart review  No clinical evidence of UTI  Rec:  ? Continue Simmons, Flomax, finasteride per urology     3  JAKE  Likely due to # 2  Improving with Simmons     The patient is stable from an ID standpoint      Antibiotics:  Ampicillin/ceftriaxone # 42    Subjective:  Patient seen on AM rounds  Feels well today  Denies fevers, chills, sweats, nausea, vomiting, or diarrhea  24 Hour Events:  No documented fevers, chills, sweats, nausea, vomiting, or diarrhea  Objective:  Vitals:  HR:  [79-80] 80  Resp:  [19] 19  BP: (127-136)/(56-60) 127/56  SpO2:  [97 %-99 %] 97 %  Temp (24hrs), Av 8 °F (36 6 °C), Min:97 3 °F (36 3 °C), Max:98 3 °F (36 8 °C)  Current: Temperature: 98 3 °F (36 8 °C)    Physical Exam:   General:  No acute distress  Psychiatric:  Awake and alert  Pulmonary:  Normal respiratory excursion without accessory muscle use  Abdomen:  Soft, nontender  Extremities:  No edema  Skin:  No rashes    Lab Results:  I have personally reviewed pertinent labs      Results from last 7 days  Lab Units 18  0505 18  0507 18  0522   SODIUM mmol/L 139 140 137   POTASSIUM mmol/L 4 6 4 6 5 7*   CHLORIDE mmol/L 106 106 105   CO2 mmol/L 26 26 27   BUN mg/dL 37* 43* 48*   CREATININE mg/dL 1 59* 1 91* 2 36*   EGFR ml/min/1 73sq m 38* 30* 24*   CALCIUM mg/dL 7 8* 7 6* 8 4 AST U/L  --   --  20   ALT U/L  --   --  22   ALK PHOS U/L  --   --  59       Results from last 7 days  Lab Units 09/24/18  0523   WBC Thousand/uL 6 50   HEMOGLOBIN g/dL 8 6*   PLATELETS Thousands/uL 213           Imaging Studies:   I have personally reviewed pertinent imaging study reports and images in PACS  EKG, Pathology, and Other Studies:   I have personally reviewed pertinent reports

## 2018-09-26 NOTE — PLAN OF CARE
Problem: OCCUPATIONAL THERAPY ADULT  Goal: Performs self-care activities at highest level of function for planned discharge setting  See evaluation for individualized goals  Treatment Interventions: ADL retraining, Functional transfer training, UE strengthening/ROM, Endurance training, Patient/family training, Cognitive reorientation, Equipment evaluation/education, Neuromuscular reeducation, Activityengagement  Equipment Recommended:  (TBD)       See flowsheet documentation for full assessment, interventions and recommendations  Outcome: Progressing  Limitation: Decreased ADL status, Decreased UE strength, Decreased cognition, Decreased endurance, Decreased self-care trans, Decreased high-level ADLs  Prognosis: Good  Assessment: Patient participated in Skilled OT session this date with interventions consisting of ADL re training with the use of correct body mechnaics, Energy Conservation techniques, safety awareness and fall prevention techniques and increase dynamic sit/ stand balance during functional activity    Patient agreeable to OT treatment session, upon arrival patient was found seated OOB to Chair  Patient requiring ocassional safety reminders  Patient continues to be functioning below baseline level, occupational performance remains limited secondary to factors listed above and increased risk for falls and injury  From OT standpoint, recommendation at time of d/c would be Home OT and family support, possibly increased safety checks pending progress  Patient to benefit from continued Occupational Therapy treatment while on TCF to address deficits as defined above and maximize level of functional independence with ADLs and functional mobility        OT Discharge Recommendation: Home OT  OT - OK to Discharge: No      Comments: Deedee Graham

## 2018-09-27 LAB
ANION GAP SERPL CALCULATED.3IONS-SCNC: 5 MMOL/L (ref 5–14)
BUN SERPL-MCNC: 31 MG/DL (ref 5–25)
CALCIUM SERPL-MCNC: 8.1 MG/DL (ref 8.4–10.2)
CHLORIDE SERPL-SCNC: 104 MMOL/L (ref 97–108)
CO2 SERPL-SCNC: 28 MMOL/L (ref 22–30)
CREAT SERPL-MCNC: 1.33 MG/DL (ref 0.7–1.5)
GFR SERPL CREATININE-BSD FRML MDRD: 47 ML/MIN/1.73SQ M
GLUCOSE SERPL-MCNC: 99 MG/DL (ref 70–99)
POTASSIUM SERPL-SCNC: 4.7 MMOL/L (ref 3.6–5)
SODIUM SERPL-SCNC: 137 MMOL/L (ref 137–147)

## 2018-09-27 PROCEDURE — 97110 THERAPEUTIC EXERCISES: CPT

## 2018-09-27 PROCEDURE — 99309 SBSQ NF CARE MODERATE MDM 30: CPT | Performed by: NURSE PRACTITIONER

## 2018-09-27 PROCEDURE — 97530 THERAPEUTIC ACTIVITIES: CPT

## 2018-09-27 PROCEDURE — 80048 BASIC METABOLIC PNL TOTAL CA: CPT | Performed by: NURSE PRACTITIONER

## 2018-09-27 PROCEDURE — 99232 SBSQ HOSP IP/OBS MODERATE 35: CPT | Performed by: INTERNAL MEDICINE

## 2018-09-27 PROCEDURE — 97116 GAIT TRAINING THERAPY: CPT

## 2018-09-27 RX ORDER — FUROSEMIDE 10 MG/ML
INJECTION INTRAMUSCULAR; INTRAVENOUS
Status: COMPLETED
Start: 2018-09-27 | End: 2018-09-27

## 2018-09-27 RX ORDER — ASCORBIC ACID 500 MG
500 TABLET ORAL DAILY
Status: DISCONTINUED | OUTPATIENT
Start: 2018-09-27 | End: 2018-10-04 | Stop reason: HOSPADM

## 2018-09-27 RX ORDER — FUROSEMIDE 10 MG/ML
20 INJECTION INTRAMUSCULAR; INTRAVENOUS ONCE
Status: DISCONTINUED | OUTPATIENT
Start: 2018-09-27 | End: 2018-09-27

## 2018-09-27 RX ORDER — FERROUS SULFATE 325(65) MG
325 TABLET ORAL
Status: DISCONTINUED | OUTPATIENT
Start: 2018-09-28 | End: 2018-10-04 | Stop reason: HOSPADM

## 2018-09-27 RX ORDER — FUROSEMIDE 10 MG/ML
20 INJECTION INTRAMUSCULAR; INTRAVENOUS ONCE
Status: COMPLETED | OUTPATIENT
Start: 2018-09-27 | End: 2018-09-27

## 2018-09-27 RX ORDER — BISACODYL 10 MG
10 SUPPOSITORY, RECTAL RECTAL DAILY PRN
Status: DISCONTINUED | OUTPATIENT
Start: 2018-09-27 | End: 2018-10-04 | Stop reason: HOSPADM

## 2018-09-27 RX ORDER — AMOXICILLIN 250 MG
1 CAPSULE ORAL 2 TIMES DAILY PRN
Status: DISCONTINUED | OUTPATIENT
Start: 2018-09-27 | End: 2018-10-04 | Stop reason: HOSPADM

## 2018-09-27 RX ORDER — FUROSEMIDE 20 MG/1
20 TABLET ORAL DAILY
Status: DISCONTINUED | OUTPATIENT
Start: 2018-09-28 | End: 2018-10-04 | Stop reason: HOSPADM

## 2018-09-27 RX ORDER — POLYETHYLENE GLYCOL 3350 17 G/17G
17 POWDER, FOR SOLUTION ORAL DAILY PRN
Status: DISCONTINUED | OUTPATIENT
Start: 2018-09-27 | End: 2018-10-04 | Stop reason: HOSPADM

## 2018-09-27 RX ADMIN — ATORVASTATIN CALCIUM 40 MG: 40 TABLET, FILM COATED ORAL at 18:39

## 2018-09-27 RX ADMIN — FINASTERIDE 5 MG: 5 TABLET, FILM COATED ORAL at 08:40

## 2018-09-27 RX ADMIN — SODIUM CHLORIDE 100 ML/HR: 900 INJECTION INTRAVENOUS at 00:19

## 2018-09-27 RX ADMIN — FUROSEMIDE 20 MG: 10 INJECTION INTRAMUSCULAR; INTRAVENOUS at 04:01

## 2018-09-27 RX ADMIN — VITAMIN D, TAB 1000IU (100/BT) 1000 UNITS: 25 TAB at 08:40

## 2018-09-27 RX ADMIN — OXYCODONE HYDROCHLORIDE AND ACETAMINOPHEN 500 MG: 500 TABLET ORAL at 13:27

## 2018-09-27 RX ADMIN — NYSTATIN: 100000 CREAM TOPICAL at 08:41

## 2018-09-27 RX ADMIN — Medication 250 MG: at 18:38

## 2018-09-27 RX ADMIN — METOPROLOL SUCCINATE 50 MG: 50 TABLET, EXTENDED RELEASE ORAL at 08:40

## 2018-09-27 RX ADMIN — AMLODIPINE BESYLATE 5 MG: 5 TABLET ORAL at 08:40

## 2018-09-27 RX ADMIN — DOCUSATE SODIUM 100 MG: 100 CAPSULE, LIQUID FILLED ORAL at 08:40

## 2018-09-27 RX ADMIN — GLIPIZIDE 2.5 MG: 5 TABLET ORAL at 18:39

## 2018-09-27 RX ADMIN — SENNOSIDES 8.6 MG: 8.6 TABLET, FILM COATED ORAL at 08:40

## 2018-09-27 RX ADMIN — TAMSULOSIN HYDROCHLORIDE 0.4 MG: 0.4 CAPSULE ORAL at 18:38

## 2018-09-27 RX ADMIN — SITAGLIPTIN 100 MG: 100 TABLET, FILM COATED ORAL at 08:40

## 2018-09-27 RX ADMIN — NYSTATIN: 100000 CREAM TOPICAL at 18:40

## 2018-09-27 RX ADMIN — FUROSEMIDE 20 MG: 10 INJECTION, SOLUTION INTRAVENOUS at 04:01

## 2018-09-27 RX ADMIN — Medication 250 MG: at 08:40

## 2018-09-27 RX ADMIN — FLUTICASONE PROPIONATE 1 SPRAY: 50 SPRAY, METERED NASAL at 08:41

## 2018-09-27 RX ADMIN — DOCUSATE SODIUM 100 MG: 100 CAPSULE, LIQUID FILLED ORAL at 18:38

## 2018-09-27 RX ADMIN — GLIPIZIDE 2.5 MG: 5 TABLET ORAL at 06:02

## 2018-09-27 NOTE — NURSING NOTE
Pt found sitting up in bed, c/o SOB, pulse ox taken 83% on RA, noted expiratory wheezing with diminished lung sounds throughout  Pt placed on 2L o2 via nasal cannula, pulse ox rechecked 97%  Pt stated that they would like to sit oob in the chair d/t SOB  Noted a 7 92lb weight gain since 9/24/18      made aware

## 2018-09-27 NOTE — NURSING NOTE
At the bedside with dr Lorie Fong pt after iv lasix given, noted large amount of urine output 600cc  Vitals checked, 160/70bp, 89HR, resp 20, 95% pulse ox dropped o2 to 1L via nasal cannula  Pt SOB improved  New orders received  Will continue to monitor

## 2018-09-27 NOTE — PLAN OF CARE
Problem: PHYSICAL THERAPY ADULT  Goal: Performs mobility at highest level of function for planned discharge setting  See evaluation for individualized goals  Treatment/Interventions: ADL retraining, Functional transfer training, LE strengthening/ROM, Elevations, Therapeutic exercise, Endurance training, Cognitive reorientation, Patient/family training, Equipment eval/education, Bed mobility, Gait training, Spoke to nursing, OT, Family, Spoke to case management (Speak to )  Equipment Recommended: Other (Comment) (To be determined)       See flowsheet documentation for full assessment, interventions and recommendations  Outcome: Progressing  Prognosis: Good  Problem List: Decreased strength, Decreased range of motion, Decreased endurance, Impaired balance, Decreased mobility, Decreased coordination, Decreased cognition, Impaired judgement, Decreased safety awareness, Impaired vision, Impaired hearing, Decreased skin integrity, Orthopedic restrictions  Assessment: Since PT Eval, pt has been seen for 8/8 skilled PT tx sessions for therex, there act, and gait/elevation training  Pt with good progress in skilled PT this past week  Improvement assessed with: functional strength, sit and stand balance, activity tolerance, sit <->supine transfers, sit <-> stand transfers, SPT's and gait  Trialed mobility with and without RW  Pt appears much steadier and safer with RW usage; at this time  Pt reported that he would like to go home without using an AD " if able"  Pt does acknowledge, that his mobility is safer and steadier when using RW  A Care Conference was held this morning with the pt and his daughter ( via speaker phone) and the interdisciplinary team  A second Care Conference was held again this PM per daughter's request; with just this PT, pt and his daughter ( via speaker phone)  Please see Care Conference Note for details  As per POC, initial plan was to discharge pt by 9/29/18   Plan however has now been extended to 10/4/18; due to goals not achieved  In addition, a new elevation goal was added to refect new information provided by daughter at [address]  Pt also noted to have increased confusion during this PM's PT tx session  Pt with increased difficulty following/processing  Instructions and became irritated when attempting to walk into rehab gym  Pt stated" I'm not going in there and doing those steps: Pt is usually very happy and this was out of character for him  After completing an ambulation trial with no AD, pt " stated aren't we going to the gym"  Pt appeared to have no recall regarding not wanting to walk into the gym, just a few minutes before that  Also with increased difficulty re-directing pt back into his bedroom  Barriers to Discharge: Inaccessible home environment, Decreased caregiver support (Lives alone, [address] home)                See flowsheet documentation for full assessment

## 2018-09-27 NOTE — ASSESSMENT & PLAN NOTE
With urinary retention continue Flomax and finasteride  Patient's had acute kidney injury secondary to acute urinary retention obstructive uropathy  He has history of mild to moderate hydronephrosis in the past as well  Was on 3 weeks of Simmons catheter use and failed a voiding trial at previous hospital   He again has acute urinary retention with postvoid bladder residual of 789  Will maintain Simmons catheter  Also continue nystatin cream for fungal infection around the penile urethra  Will need a urology consult and do not do a voiding trial   notified that pt will need outpatient appointment  Two kidney  ultrasounds done in the last 1 month  Will not repeat an ultrasound at this time  Continue to monitor BMP    Will check in AM

## 2018-09-27 NOTE — NURSING NOTE
New orders received  NSS at 100ml/hr stopped, pt given IV Lasix per order  Will continue to monitor

## 2018-09-27 NOTE — ASSESSMENT & PLAN NOTE
Will check CBC in a m    Status post 2 units of packed red blood cell at [facility]  EGD showed Candida esophagitis and patient cannot complete a colonoscopy due to acuity  of condition / NSTEMI  Continue PPI  Discontinued AC on discharge from [facility]  Follow up with GI

## 2018-09-27 NOTE — ASSESSMENT & PLAN NOTE
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  Positive blood cultures on August 2nd and Aug 9, 2018   repeat blood culture on Aug 11, 2018 on negative  LAILA on  Aug 16, 2018 reported 0 7 cm liner echodensity on the posterior leaflet of the mitral valve, no indwelling cardiac devices or prosthetic joint  Completed ampicillin 2 g every 6 hours and ceftriaxone 2 g every 12 hours as of 9/26/18  On florastor  Lt UE PICC line in place, no signs of infection, nursing care to prevent line associated infection according to the intra hospital policy  Weekly dressing change  Will d/c tomorrow  Continue rehab PT OT evaluation - Pt is stable - no fevers chills, CP or SOB  Repeat blood cultures on 10/10/18

## 2018-09-27 NOTE — ASSESSMENT & PLAN NOTE
Patient's creatinine was elevated to 2 36  His baseline is 1 1  Potassium was also elevated to 5 7  Potassium supplements, losartan, and lasix was stopped at that time  He was hydrated and creatinine is now down to 1 33  Potassium 4 7  He required 20mg lasix IV x1 last night for shortness of breath, which is not resolved  Will restart 20mg po lasix daily tomorrow, and continue to monitor  He has 2+ BLE edema, but lungs are now clear  Will not restart losartan   VS stable

## 2018-09-27 NOTE — ASSESSMENT & PLAN NOTE
Not a candidate for [facility] given the profound anemia required blood transfusion  Patient will follow up with PCP and Cardiology to re-evaluate [facility]  Continue heart rate control medication  Consider inpatient cardiology consult to clarify need of aspirin    Last Hg 8 6    Will repeat CBC in AM

## 2018-09-27 NOTE — ASSESSMENT & PLAN NOTE
Lab Results   Component Value Date    HGBA1C 6 6 (H) 09/19/2018     Will continue ADA diet  Accu-Cheks insulin sliding scale  Lantus was discontinued due to hypoglycemia at [facility]  Will continue oral DM Rx  Will decrease glipizide to 2 5 mg twice daily secondary to [facility]  Accuchecks BID    Check hemoglobin A1c is 6 6

## 2018-09-27 NOTE — PROGRESS NOTES
Progress Note - Tree Marley 6/21/1929, 80 y o  male MRN: [id number]    Unit/Bed#: [facility] 547-01 Encounter: [id number]    Primary Care Provider: Merlin Babe, MD   Date and time admitted to hospital: 9/18/2018  6:15 PM    Creat baseline 1 1  JAKE 2/2 urinary retention  Creat trending down now 1 33  Had IV lasix last night with resolution of symptoms  No IVF  Will restart 20mg po lasix daily, starting tomorrow  Continue to hold losartan and potassium as pt had hyperkalemia during episode of JAKE  Potassium now 4 7   VS are stable  BMP and CBC for AM   Also placed on PO iron and vitamin C  Will order prn bowel meds  * Endocarditis of mitral valve   Assessment & Plan    The patient was transferred from [facility] to [facility] complete a course of ampicillin and ceftriaxone for E faecalis endocarditis  Positive blood cultures on August 2nd and Aug 9, 2018   repeat blood culture on Aug 11, 2018 on negative  LAILA on  Aug 16, 2018 reported 0 7 cm liner echodensity on the posterior leaflet of the mitral valve, no indwelling cardiac devices or prosthetic joint  Completed ampicillin 2 g every 6 hours and ceftriaxone 2 g every 12 hours as of 9/26/18  On florastor  Lt UE PICC line in place, no signs of infection, nursing care to prevent line associated infection according to the intra hospital policy  Weekly dressing change  Will d/c tomorrow  Continue rehab PT OT evaluation - Pt is stable - no fevers chills, CP or SOB  Repeat blood cultures on 10/10/18  CKD (chronic kidney disease), stage III   Assessment & Plan    His creatinine was 1 10 on 9/19, and then went up to 2 36 on 9/24 2/2 to urinary retention He has hydrated and now 1 33  He did require one time dose of lasix 20mg for SOB 2/2 fluid overload last night  He states he noticed resolution of his symptoms very shortly after receiving the dose  Currently lungs are clear  Will monitor renal function and avoid nephrotoxin drugs    He does have BLE edema 1-2+ Was on lasix 20mg daily  Will restart on lasix 20mg QOD for now and continue to monitor renal function  Will also maintain butt for urinary retention   notified that pt will need f/u urology appointment at discharge  Chronic diastolic (congestive) heart failure (HCC)   Assessment & Plan    Continue BB Lasix 20mg daily starting tomorrow  Monitor volume status,blood pressure  Weight down 4lbs since admission  Benign prostatic hyperplasia with urinary retention   Assessment & Plan    With urinary retention continue Flomax and finasteride  Patient's had acute kidney injury secondary to acute urinary retention obstructive uropathy  He has history of mild to moderate hydronephrosis in the past as well  Was on 3 weeks of Butt catheter use and failed a voiding trial at previous hospital   He again has acute urinary retention with postvoid bladder residual of 789  Will maintain Butt catheter  Also continue nystatin cream for fungal infection around the penile urethra  Will need a urology consult and do not do a voiding trial   notified that pt will need outpatient appointment  Two kidney  ultrasounds done in the last 1 month  Will not repeat an ultrasound at this time  Continue to monitor BMP  Will check in AM      JAKE (acute kidney injury) ([facility] [address] )   Assessment & Plan    Patient's creatinine was elevated to 2 36  His baseline is 1 1  Potassium was also elevated to 5 7  Potassium supplements, losartan, and lasix was stopped at that time  He was hydrated and creatinine is now down to 1 33  Potassium 4 7  He required 20mg lasix IV x1 last night for shortness of breath, which is not resolved  Will restart 20mg po lasix daily tomorrow, and continue to monitor  He has 2+ BLE edema, but lungs are now clear  Will not restart losartan   VS stable     Coronary artery disease involving native coronary artery   Assessment & Plan    Continue metoprolol, norvasc, and statin Anemia   Assessment & Plan    Will check CBC in a m  Status post 2 units of packed red blood cell at [facility]  EGD showed Candida esophagitis and patient cannot complete a colonoscopy due to acuity  of condition / NSTEMI  Continue PPI  Discontinued AC on discharge from [facility]  Follow up with GI     Atrial fibrillation [facility])   Assessment & Plan    Not a candidate for [facility] [place] given the profound anemia required blood transfusion  Patient will follow up with PCP and Cardiology to re-evaluate [facility] [place]  Continue heart rate control medication  Consider inpatient cardiology consult to clarify need of aspirin    Last Hg 8 6  Will repeat CBC in AM       Essential hypertension   Assessment & Plan    Blood pressure is controlled at this time  Continue metoprolol and norvasc  Lasix on hold for JAKE, until last night  Pt had 20mg IV lasix x1 for fluid overload, and is now stable  Will restart 20mg po lasix daily, starting tomorrow  Type 2 diabetes mellitus without complication, without long-term current use of insulin [facility])   Assessment & Plan    Lab Results   Component Value Date    HGBA1C 6 6 (H) 09/19/2018     Will continue ADA diet  Accu-Cheks insulin sliding scale  Lantus was discontinued due to hypoglycemia at [facility]  Will continue oral DM Rx  Will decrease glipizide to 2 5 mg twice daily secondary to [facility]  Accuchecks BID  Check hemoglobin A1c is 6 6           Candida esophagitis (HCC)   Assessment & Plan    EGD from Aug 15, 2018 reported Candida esophagitis  Patient completed 4 days course of fluconazole  Continue PPI - no complaints at this time       Status post non-ST elevation myocardial infarction (NSTEMI)   Assessment & Plan    Secondary to acute anemia ==> supply demand mismatch, type 2 NSTEMI  Status post 2 units of PRBC  Transfusion at [facility]  Echocardiogram from Sep 6, 2018 reported ejection fraction to be 55% normal left ventricle chamber size inferior and inferior lateral hypokinesis  Patient underwent EGD which did not reveal a source of bleeding colonoscopy was postponed due to acuty  of condition  No anticoagulation recommended  Outpatient cardiology and GI follow-up recommended  VTE Pharmacologic Prophylaxis:   Pharmacologic: hold for anemia - pt ambulatory  Mechanical VTE Prophylaxis in Place: Yes    Patient Centered Rounds: I have performed bedside rounds with nursing staff today  Discussions with Specialists or Other Care Team Provider: Dr David Strickland    Education and Discussions with Family / Patient: Plan of care reviewed with pt  Time Spent for Care: 20 minutes  More than 50% of total time spent on counseling and coordination of care as described above  Current Length of Stay: 9 day(s)    Current Patient Status: SNF Short Term Inpatient   Certification Statement: The patient will continue to require additional inpatient hospital stay due to deconditioning    Discharge Plan: Pt is not yet ready for discharge  Pending PT progress  Code Status: Level 1 - Full Code      Subjective:   Pt reports feeling well  No CP, SOB, N/V/D, constipation, dysuria, dizziness, light-headedness  Objective:     Vitals:   Temp (24hrs), Av 3 °F (36 8 °C), Min:98 °F (36 7 °C), Max:98 6 °F (37 °C)    HR:  [] 91  Resp:  [17-20] 18  BP: (135-160)/(61-70) 139/61  SpO2:  [83 %-98 %] 95 %  Body mass index is 21 kg/m²  Input and Output Summary (last 24 hours): Intake/Output Summary (Last 24 hours) at 18 1135  Last data filed at 18 1045   Gross per 24 hour   Intake             2730 ml   Output             5450 ml   Net            -2720 ml       Physical Exam:     Physical Exam   Constitutional: He is oriented to person, place, and time  He appears well-developed and well-nourished  No distress  HENT:   Head: Normocephalic and atraumatic  Eyes: Right eye exhibits no discharge  Left eye exhibits no discharge  Neck: No JVD present     Cardiovascular: Normal rate, regular rhythm, normal heart sounds and intact distal pulses  Exam reveals no gallop and no friction rub  No murmur heard  Pulmonary/Chest: Effort normal and breath sounds normal  No stridor  No respiratory distress  He has no wheezes  He has no rales  He exhibits no tenderness  Abdominal: Soft  Bowel sounds are normal  He exhibits no distension  There is no tenderness  There is no rebound and no guarding  Musculoskeletal: He exhibits edema (2+ BLE edema)  He exhibits no tenderness  Neurological: He is alert and oriented to person, place, and time  Skin: Skin is warm and dry  He is not diaphoretic  Psychiatric: He has a normal mood and affect  Additional Data:     Labs:      Results from last 7 days  Lab Units 09/24/18  0523   WBC Thousand/uL 6 50   HEMOGLOBIN g/dL 8 6*   HEMATOCRIT % 26 1*   PLATELETS Thousands/uL 213       Results from last 7 days  Lab Units 09/27/18  0503  09/24/18  0522   SODIUM mmol/L 137  < > 137   POTASSIUM mmol/L 4 7  < > 5 7*   CHLORIDE mmol/L 104  < > 105   CO2 mmol/L 28  < > 27   BUN mg/dL 31*  < > 48*   CREATININE mg/dL 1 33  < > 2 36*   CALCIUM mg/dL 8 1*  < > 8 4   ALK PHOS U/L  --   --  59   ALT U/L  --   --  22   AST U/L  --   --  20   < > = values in this interval not displayed  Results from last 7 days  Lab Units 09/23/18  1119 09/23/18  0622 09/22/18  2053 09/22/18  1625 09/22/18  1143 09/22/18  0615 09/21/18  2038 09/21/18  1655 09/21/18  1146 09/21/18  0616 09/20/18  2047 09/20/18  1638   POC GLUCOSE mg/dl 137* 84 175* 149* 175* 125* 153* 115* 177* 84 172* 96             * I Have Reviewed All Lab Data Listed Above  * Additional Pertinent Lab Tests Reviewed:  Most recent labs reviewed    Imaging:    Imaging Reports Reviewed Today Include: none  Imaging Personally Reviewed by Myself Includes:  none    Recent Cultures (last 7 days):           Last 24 Hours Medication List:     Current Facility-Administered Medications:  acetaminophen 650 mg Oral Q6H PRN Deepti Hernandez Pedro Benavidez MD   amLODIPine 5 mg Oral Daily Bailey Alanis MD   atorvastatin 40 mg Oral Daily With Rito Landrum MD   benzonatate 200 mg Oral TID PRN Luz Monroy MD   cholecalciferol 1,000 Units Oral Daily Luz Monroy MD   docusate sodium 100 mg Oral BID Luz Monroy MD   finasteride 5 mg Oral Daily Luz Monroy MD   fluticasone 1 spray Nasal Daily Luz Monroy MD   [START ON 9/28/2018] furosemide 20 mg Oral Daily DREAD Cervantes   glipiZIDE 2 5 mg Oral BID AC Bailey Alanis MD   metoprolol succinate 50 mg Oral Daily Luz Monroy MD   nitroglycerin 0 4 mg Sublingual Q5 Min PRN Luz Monroy MD   nystatin  Topical BID Bailey Alanis MD   saccharomyces boulardii 250 mg Oral BID DREAD Cervantes   senna 1 tablet Oral Daily Luz Monroy MD   sitaGLIPtin 100 mg Oral Daily Luz Monroy MD   tamsulosin 0 4 mg Oral Daily With DREAD Purdy   tiotropium 18 mcg Inhalation Daily PRN Dax Fonseca MD   tuberculin 5 Units Intradermal PRN Dax Fonseca MD        Today, Patient Was Seen By: DREAD Gonzales

## 2018-09-27 NOTE — ASSESSMENT & PLAN NOTE
Secondary to acute anemia ==> supply demand mismatch, type 2 NSTEMI  Status post 2 units of PRBC  Transfusion at [facility]  Echocardiogram from Sep 6, 2018 reported ejection fraction to be 55% normal left ventricle chamber size inferior and inferior lateral hypokinesis  Patient underwent EGD which did not reveal a source of bleeding colonoscopy was postponed due to acuty  of condition  No anticoagulation recommended  Outpatient cardiology and GI follow-up recommended

## 2018-09-27 NOTE — ASSESSMENT & PLAN NOTE
His creatinine was 1 10 on 9/19, and then went up to 2 36 on 9/24 2/2 to urinary retention He has hydrated and now 1 33  He did require one time dose of lasix 20mg for SOB 2/2 fluid overload last night  He states he noticed resolution of his symptoms very shortly after receiving the dose  Currently lungs are clear  Will monitor renal function and avoid nephrotoxin drugs  He does have BLE edema 1-2+ Was on lasix 20mg daily  Will restart on lasix 20mg QOD for now and continue to monitor renal function  Will also maintain butt for urinary retention   notified that pt will need f/u urology appointment at discharge

## 2018-09-27 NOTE — ASSESSMENT & PLAN NOTE
EGD from Aug 15, 2018 reported Candida esophagitis  Patient completed 4 days course of fluconazole  Continue PPI - no complaints at this time

## 2018-09-27 NOTE — PLAN OF CARE
Problem: OCCUPATIONAL THERAPY ADULT  Goal: Performs self-care activities at highest level of function for planned discharge setting  See evaluation for individualized goals  Treatment Interventions: ADL retraining, Functional transfer training, UE strengthening/ROM, Endurance training, Patient/family training, Cognitive reorientation, Equipment evaluation/education, Neuromuscular reeducation, Activityengagement  Equipment Recommended:  (TBD)       See flowsheet documentation for full assessment, interventions and recommendations  Outcome: Progressing  Limitation: Decreased ADL status, Decreased UE strength, Decreased cognition, Decreased endurance, Decreased self-care trans, Decreased high-level ADLs  Prognosis: Good  Assessment: Patient participated in Skilled OT session this date with interventions consisting of Energy Conservation techniques, safety awareness and fall prevention techniques,  therapeutic activities to: increase activity tolerance, increase standing tolerance time with unilateral UE support to complete sink level ADLs and increase dynamic sit/ stand balance during functional activity    Patient agreeable to OT treatment session, upon arrival patient was found seated OOB to Chair  In comparison to previous session, patient motivated and cooperative  Patient continues to be functioning below baseline level, occupational performance remains limited secondary to factors listed above and increased risk for falls and injury  From OT standpoint, recommendation at time of d/c would be Home OT and safety checks pending progress  Patient to benefit from continued Occupational Therapy treatment while on TCF  to address deficits as defined above and maximize level of functional independence with ADLs and functional mobility        OT Discharge Recommendation: Home OT  OT - OK to Discharge: No      Comments: Chris Uribe

## 2018-09-27 NOTE — PROGRESS NOTES
Progress Note - Infectious Disease   Sagar Schusteri 80 y o  male MRN: [id number]  Unit/Bed#: -01 Encounter: [id number]      Impression/Recommendations:  1   Enterococcal MV endocarditis  Diagnosed at [facility]  Unclear source  CT A/P showed bladder thickening with hydro suggesting ?  source  No urine cultures done  Persistent bacteremia which finally cleared  Small vegetation seen on LAILA  Now status post 6 weeks ampicillin slap ceftriaxone  Rec:  ? Continue to follow closely off antibiotics  ? Check repeat blood cultures in 2 weeks  ? Discontinue PICC line once IV diuresis complete     2   BPH with urinary retention  Chronic issue based on chart review  No clinical evidence of UTI  Rec:  ? Continue Simmons, Flomax, finasteride per urology     3   JAKE  Likely due to # 2  Improving with Simmons     The patient is stable from an ID standpoint  Discussed in detail with the primary service      Antibiotics:  Off antibiotics # 1    Subjective:  Patient seen on AM rounds  Feels much better  Denies fevers, chills, sweats, nausea, vomiting, or diarrhea  24 Hour Events:  Had hypoxia with noted weight gain since admission  Received Lasix overnight with good urine output  Oxygenation now stable on room air  Objective:  Vitals:  HR:  [] 91  Resp:  [17-20] 18  BP: (135-160)/(61-70) 139/61  SpO2:  [83 %-98 %] 95 %  Temp (24hrs), Av 3 °F (36 8 °C), Min:98 °F (36 7 °C), Max:98 6 °F (37 °C)  Current: Temperature: 98 °F (36 7 °C)    Physical Exam:   General:  No acute distress  Psychiatric:  Awake and alert  Pulmonary:  Normal respiratory excursion without accessory muscle use  Abdomen:  Soft, nontender  Extremities:  No edema  Skin:  No rashes    Lab Results:  I have personally reviewed pertinent labs      Results from last 7 days  Lab Units 18  0503 18  0505 18  0507 18  0522   SODIUM mmol/L 137 139 140 137   POTASSIUM mmol/L 4 7 4 6 4 6 5 7*   CHLORIDE mmol/L 104 106 106 105   CO2 mmol/L 28 26 26 27   BUN mg/dL 31* 37* 43* 48*   CREATININE mg/dL 1 33 1 59* 1 91* 2 36*   EGFR ml/min/1 73sq m 47* 38* 30* 24*   CALCIUM mg/dL 8 1* 7 8* 7 6* 8 4   AST U/L  --   --   --  20   ALT U/L  --   --   --  22   ALK PHOS U/L  --   --   --  59       Results from last 7 days  Lab Units 09/24/18  0523   WBC Thousand/uL 6 50   HEMOGLOBIN g/dL 8 6*   PLATELETS Thousands/uL 213           Imaging Studies:   I have personally reviewed pertinent imaging study reports and images in PACS  EKG, Pathology, and Other Studies:   I have personally reviewed pertinent reports

## 2018-09-27 NOTE — ASSESSMENT & PLAN NOTE
Blood pressure is controlled at this time  Continue metoprolol and norvasc  Lasix on hold for JAKE, until last night  Pt had 20mg IV lasix x1 for fluid overload, and is now stable  Will restart 20mg po lasix daily, starting tomorrow

## 2018-09-27 NOTE — NURSING NOTE
Alert and Oriented x 3 , Qagan Tay[place]ungin forgetful   No SOB noted POX 97% on RA   Lungs clear , +2 edema of lower extremities noted

## 2018-09-27 NOTE — PHYSICAL THERAPY NOTE
Physical Therapy Daily Treatment Note and Weekly PT Progress Note         09/27/18 PT tx session: 58 minutes total (14:08 to 15:06)   Pain Assessment   Pain Assessment No/denies pain   Pain Score No Pain   Restrictions/Precautions   Weight Bearing Precautions Per Order No   Other Precautions Cognitive; Bed Alarm; Chair Alarm;Limb alert; Fall Risk;Hard of hearing;Visual impairment; Impulsive   General   Chart Reviewed Yes   Response to Previous Treatment Patient with no complaints from previous session  Family/Caregiver Present Yes  (Daughter Ivy Vazquez present for Guest of a Guest)   Cognition   Overall Cognitive Status Impaired  (Confused today)   Attention Difficulty attending to directions   Following Commands Follows multistep commands with increased time or repetition   Comments (Pt with increased difficulty processing commands today)   Subjective   Subjective (" I feel safer using the walker")   Transfers   Sit to Stand 6  Modified independent   Additional items Increased time required  (Good hand placement today)   Stand to Sit 6  Modified independent   Additional items Increased time required  (Good hand placement today, + controlled descent)   Stand pivot 5  Supervision   Additional items (SPT with and without RW S ( steadier with RW))   Ambulation/Elevation   Gait pattern Decreased foot clearance; Forward Flexion;Narrow JENNIFER; Short stride; Improper Weight shift  (Medial lateral sway , decreased bilateral arm swing with no )   Additional items (Amb with no AD 80 feet, then 65 feet with MIN A ( unsteady))   Assistive Device (Amb with  feet x 2  with Supervision)   Additional items (Pt initially declined to perform elevation training)   Number of Stairs (Pt negoitated 2 steps with bilateral hands on RHR up with: S)   Balance   Static Sitting (Good+)   Dynamic Sitting (Good (-))   Static Standing (Fair+ with RW; Fair without AD)   Dynamic Standing (Fair/Fair+ with RW; Fair without AD ( for SPT))   Ambulatory (Fair/Fair+ with RW; Poor+ without AD ( for ambulation)   Endurance Deficit   Endurance Deficit Yes   Exercises   Hamstring Sets Bilateral  (Hamstring curls with green TB ( 15 x 2))   Hip Flexion AROM;AAROM; Bilateral;Sitting  (10 x 3)   Hip Abduction Bilateral;Sitting  (1 1/2 minutes x 2 ( with green TB))   Knee AROM Long Arc Quad Bilateral;Sitting  (10 x 3 ( with 2 #))   Assessment:  Since PT Sandra, pt has been seen for 8/8 skilled PT tx sessions for therex, there act, and gait/elevation training  Pt with good progress in skilled PT this past week  Improvement assessed with: functional strength, sit and stand balance, activity tolerance, sit <->supine transfers, sit <-> stand transfers, SPT's and gait  Trialed mobility with and without RW  Pt appears much steadier and safer with RW usage; at this time  Pt reported that he would like to go home without using an AD " if able"  Pt does acknowledge, that his mobility is safer and steadier when using RW  A Care Conference was held this morning with the pt and his daughter ( via speaker phone) and the interdisciplinary team  A second Care Conference was held again this PM per daughter's request; with just this PT, pt and his daughter ( via speaker phone)  Please see Care Conference Note below for details  As per POC, initial plan was to discharge pt by 9/29/18  Plan however has now been extended to 10/4/18; due to goals not achieved  In addition, a new elevation goal was added to refect new information provided by daughter at [address]  Pt also noted to have increased confusion during this PM's PT tx session  Pt with increased difficulty following/processing  Instructions and became irritated when attempting to walk into rehab gym  Pt stated" I'm not going in there and doing those steps: Pt is usually very happy and this was out of character for him  After completing an ambulation trial with no AD, pt " stated aren't we going to the gym"   Pt appeared to have no recall regarding not wanting to walk into the gym, just a few minutes before that  Also with increased difficulty re-directing pt back into his bedroom  Prognosis Good   Problem List Decreased strength;Decreased range of motion;Decreased endurance; Impaired balance;Decreased mobility; Decreased coordination;Decreased cognition; Impaired judgement;Decreased safety awareness; Impaired vision; Impaired hearing;Decreased skin integrity;Orthopedic restrictions   Barriers to Discharge Inaccessible home environment;Decreased caregiver support  (Lives alone, 4 [address] home)   Goals   Patient Goals (" I want to get home walking like I did bedore")   STG Expiration Date (New Expiration Date:10/4/18; Previous Date: 9/29/18)   LTG Expiration Date (New Expiration Date:10/4/18; Previous Date: 9/29/18)    1  Patient will perform all bed mobility with modified independence in order to get in/out of bed  ONGOING  CURRENTLY: sit <->supine with Supervision ( cues for log roll technique)    2  Patient will perform all functional transfers with modified independence in order to facilitate transfers from one surface to another  PARTIALLY ACHIEVED  CURRENTLY: sit <->stand with: MOD I  CURRENTLY: SPT with RW and Superviison  CURRENTLY: SPT with no AD with: Supervision ( appeared unsteady without RW)    3  To improve bilateral hip flexion strength form 4-/5 to at least 4/5 to improve stability in gait  ONGOING ( Not formally  tested today; however pt required A/AAROM to perform bilateral hip flexion exercise which is grossly 3- to 3/5 -> pt also assessed to have  increased BLE edema present)    4  Patient will ambulate with modified independence x at least 200 ft with least restrictive assistive device in order to safely access all necessary areas of home and to enable walking within the home to complete activities of daily living    ONGOING  CURRENTLY: Pt ambulated with a RW for 175 feet to 336 feet with RW and Supervision     CURRENTLY: Pt ambulated with no AD for 80 feet with MIN A    5  Patient will ascend/descend 2 steps with bilateral  handrails with device prn with supervision to enter/exit home   ACHIEVED ( pt's daughter stated at [address] today that pt's 2 handrails are far apart)    CURRENTLY: Pt negotiated 2 steps with bilateral hands on right HR up with Supervision    6  New Goal ( effective 9/27/18)  Pt will negotiate 2 curb steps with RW, no handrails, with Supervision ( to simulate step into foyer and step to enter home with no rails)         Treatment Day 8   Plan   Treatment/Interventions ADL retraining;Functional transfer training;LE strengthening/ROM; Elevations; Therapeutic exercise; Endurance training;Cognitive reorientation;Patient/family training;Equipment eval/education; Bed mobility;Gait training; Compensatory technique education;Spoke to MD;Spoke to nursing;Spoke to case management;Spoke to advanced practitioner;OT;Family   Progress Progressing toward goals   PT Frequency (6x/week)   Recommendation   Equipment Recommended (To be determined closer to discharge)     Care Conference: 10 minutes (12:11 to 12:21)    Attendance: Pt and his daughter Dhara Srinivasan, via voice conference on speaker phone), PT, IVEY, DON, and SW all in attendance for meeting    Physical Therapy: Reviewed functional status from PT IE , discussed STG'/LTG's established on PT IE and progression made towards these goal in prep for discharge back to home alone  Pt is currently performing bed mobility with Supervision ( educated pt on cues for log roll technique for low back protection), performs sit <->stand transfers with MOD I, and SPT's with and without RW and Supervision  Pt is currently ambulating up to 336 feet with a RW and Supervision and  80 feet with MIN A and no AD  Pt appears safest and steadiest with RW usage at this time  Discussed various standardized tests that can be administered to determine most appropriate AD to use at this time   Pt also negotiated 2 steps with bilateral hands on right handrail up with Supervision  Daughter reported that the pt actually has 2 additional steps without handrails that he needs to perform ( 1 step thru the doorway and the other step into the foyer  New goal added in assessment section above to reflect new information provided  This PT then left meeting to attend another previously scheduled meeting  After completion of Care Conference, pt's daughter made SW aware that she wanted to have another Care Conference, with this PT  this afternoon, to further discuss PT matters  Daughter stated that she had timed the meeting and this PT " only spoke for 2 minutes"  Daughter also for no apparent reason became upset with DON and did not want him present during this PM's Care Conference  Care Conference: 10 minutes (14:15 to 14:25)    Attendance: Pt and his daughter Vicky Ott, via voice conference on speaker phone) and this PT in attendance for meeting      Physical Therapy: This PT called daughter Vicky Ott ) and placed her on speaker phone  Daughter appeared to become upset when she was notified that her father was present at the meeting  The pt stated that he had a right to hear what was going on at the meeting since " the meeting was about " him  Pt's daughter asked this PT to take her off speaker phone  Daughter then began to discuss the outdoor stair set-up exactly as she did this morning  This PT then spoke out loud to notify pt what she was saying  Pt then asked to put daughter on speaker phone, so he could "hear what she is saying"  After placing her on speaker the DON walked into the office  She asked to again be taken off of speaker  Once off speaker she stated " Did the DON just walk into the office?"  I stated " Yes"  She then yelled out " I'm done" and slammed the phone down  DON then left the office  I called her back at [phone] and the answering machine picked up   This PT then left a message again regarding the information provided at the morning care conference ( see above), plan to work towards Independent and safe mobility with most appropriate AD vs no AD and additional 2 steps with no handrails (as she requested)      Akhil Murillo, PT

## 2018-09-27 NOTE — OCCUPATIONAL THERAPY NOTE
Occupational Therapy Treatment Note    Name:  Lupe Tate   MRN:   [id number]  Age:     80 y o  Patient Active Problem List   Diagnosis    Endocarditis of mitral valve    Status post non-ST elevation myocardial infarction (NSTEMI)    Candida esophagitis (HCC)    Chronic diastolic (congestive) heart failure (HCC)    Type 2 diabetes mellitus without complication, without long-term current use of insulin (HCC)    Essential hypertension    Benign prostatic hyperplasia with urinary retention    CKD (chronic kidney disease), stage III    Atrial fibrillation (HCC)    Anemia    Coronary artery disease involving native coronary artery    JAKE (acute kidney injury) ([facility] [place] [address] )     Endocarditis [I38]      Subjective/Goals:" I want to do well"    Vitals:163/71 seated RAO2 99%, O2 same end of tx    OT total treatment time:11:02-12:00 tx  Attended 12:00-12:14c/c    Additional goals & Comments: Pt seen for OT tx, cleared by NRS for tx  C/c followed tx      09/27/18 1214   Restrictions/Precautions   Weight Bearing Precautions Per Order No   Other Precautions Cognitive; Fall Risk   Pain Assessment   Pain Assessment No/denies pain   Hospital Pain Intervention(s) Medication (See MAR); Ambulation/increased activity;Repositioned; Emotional support   ADL   LB Dressing Comments (MI to don socks and shoes)   Kitchen Mobility   Kitchen-Mobility Level (S  for mob  item retrieval and transport  edu for walker tray)   Functional Standing Tolerance   Time 7min   Comments Laundry   Transfers   Sit to Stand 6  Modified independent   Additional items Armrests   Stand to Sit 6  Modified independent   Additional items Armrests   Stand pivot 5  Supervision   Additional items (w/ RW)   Additional Comments F Dyn F+ static   Functional Mobility   Functional Mobility 5  Supervision   Additional items Rolling walker   Tub Transfers   Tub Transfers Supervision   Tub Transfers Comments (w/ tub bench)   Therapeutic Exercise - ROM   UE-ROM (trunk flex w/ therapy ball 30 reps)   Cognition   Overall Cognitive Status [facility]   Arousal/Participation Alert; Cooperative   Attention Within functional limits   Orientation Level Oriented X4   Memory Within functional limits   Following Commands Follows all commands and directions without difficulty   Activity Tolerance   Activity Tolerance Patient tolerated treatment well   Assessment   Assessment Patient participated in Skilled OT session this date with interventions consisting of Energy Conservation techniques, safety awareness and fall prevention techniques,  therapeutic activities to: increase activity tolerance, increase standing tolerance time with unilateral UE support to complete sink level ADLs and increase dynamic sit/ stand balance during functional activity    Patient agreeable to OT treatment session, upon arrival patient was found seated OOB to Chair  In comparison to previous session, patient motivated and cooperative  Patient continues to be functioning below baseline level, occupational performance remains limited secondary to factors listed above and increased risk for falls and injury  From OT standpoint, recommendation at time of d/c would be Home OT and safety checks pending progress  Patient to benefit from continued Occupational Therapy treatment while on TCF  to address deficits as defined above and maximize level of functional independence with ADLs and functional mobility  Plan   Treatment Interventions Functional transfer training; Activityengagement   Goal Expiration Date 10/05/18   Treatment Day 8   OT Frequency (6 x/wk)   Recommendation   OT Discharge Recommendation Home OT   OT - OK to Discharge No     C/c followed tx  Attended by NRS, SS, IVEY,pt, Daughter and her Aunt on conference call  IVEY reviewed goals and progress  IVEY reported score on MOCA  Daughter concerned pt's hearing affected score, Daughter reassured pt stated that he" could hear clearly" during assessment   Reported pt interested in shower bench  Per Daughter pt has low toilet, edu safety handles or 3 in 1 available  Per pt will be having grab bars installed  Daughter requested med management edu, [address] stated that this can be added as goal  Edu goal expiration updated to 10/5/18     Chris Uribe

## 2018-09-28 LAB
ANION GAP SERPL CALCULATED.3IONS-SCNC: 5 MMOL/L (ref 5–14)
ANISOCYTOSIS BLD QL SMEAR: PRESENT
BUN SERPL-MCNC: 30 MG/DL (ref 5–25)
CALCIUM SERPL-MCNC: 8.2 MG/DL (ref 8.4–10.2)
CHLORIDE SERPL-SCNC: 102 MMOL/L (ref 97–108)
CO2 SERPL-SCNC: 29 MMOL/L (ref 22–30)
CREAT SERPL-MCNC: 1.16 MG/DL (ref 0.7–1.5)
EOSINOPHIL # BLD AUTO: 0.67 THOUSAND/UL (ref 0–0.4)
EOSINOPHIL NFR BLD MANUAL: 11 % (ref 0–6)
ERYTHROCYTE [DISTWIDTH] IN BLOOD BY AUTOMATED COUNT: 19.5 %
GFR SERPL CREATININE-BSD FRML MDRD: 56 ML/MIN/1.73SQ M
GLUCOSE SERPL-MCNC: 69 MG/DL (ref 70–99)
HCT VFR BLD AUTO: 24.2 % (ref 41–53)
HGB BLD-MCNC: 8.1 G/DL (ref 13.5–17.5)
HYPERCHROMIA BLD QL SMEAR: PRESENT
LYMPHOCYTES # BLD AUTO: 0.92 THOUSAND/UL (ref 0.5–4)
LYMPHOCYTES # BLD AUTO: 15 % (ref 20–50)
MCH RBC QN AUTO: 30.6 PG (ref 26.8–34.3)
MCHC RBC AUTO-ENTMCNC: 33.4 G/DL (ref 31.4–37.4)
MCV RBC AUTO: 92 FL (ref 80–100)
MONOCYTES # BLD AUTO: 0.49 THOUSAND/UL (ref 0.2–0.9)
MONOCYTES NFR BLD AUTO: 8 % (ref 1–10)
NEUTS BAND NFR BLD MANUAL: 1 % (ref 0–8)
NEUTS SEG # BLD: 4.03 THOUSAND/UL (ref 1.8–7.8)
NEUTS SEG NFR BLD AUTO: 65 %
PLATELET # BLD AUTO: 187 THOUSANDS/UL (ref 150–450)
PLATELET BLD QL SMEAR: ADEQUATE
PMV BLD AUTO: 7.4 FL (ref 8.9–12.7)
POTASSIUM SERPL-SCNC: 4.5 MMOL/L (ref 3.6–5)
RBC # BLD AUTO: 2.64 MILLION/UL (ref 4.5–5.9)
RBC MORPH BLD: ABNORMAL
SODIUM SERPL-SCNC: 136 MMOL/L (ref 137–147)
TOTAL CELLS COUNTED SPEC: 100
WBC # BLD AUTO: 6.1 THOUSAND/UL (ref 4.31–10.16)

## 2018-09-28 PROCEDURE — 85007 BL SMEAR W/DIFF WBC COUNT: CPT | Performed by: NURSE PRACTITIONER

## 2018-09-28 PROCEDURE — G0008 ADMIN INFLUENZA VIRUS VAC: HCPCS

## 2018-09-28 PROCEDURE — 97110 THERAPEUTIC EXERCISES: CPT

## 2018-09-28 PROCEDURE — 97535 SELF CARE MNGMENT TRAINING: CPT

## 2018-09-28 PROCEDURE — G0008 ADMIN INFLUENZA VIRUS VAC: HCPCS | Performed by: FAMILY MEDICINE

## 2018-09-28 PROCEDURE — 97116 GAIT TRAINING THERAPY: CPT

## 2018-09-28 PROCEDURE — 99232 SBSQ HOSP IP/OBS MODERATE 35: CPT | Performed by: INTERNAL MEDICINE

## 2018-09-28 PROCEDURE — 97530 THERAPEUTIC ACTIVITIES: CPT

## 2018-09-28 PROCEDURE — 90662 IIV NO PRSV INCREASED AG IM: CPT | Performed by: FAMILY MEDICINE

## 2018-09-28 PROCEDURE — 80048 BASIC METABOLIC PNL TOTAL CA: CPT | Performed by: NURSE PRACTITIONER

## 2018-09-28 PROCEDURE — 85027 COMPLETE CBC AUTOMATED: CPT | Performed by: NURSE PRACTITIONER

## 2018-09-28 RX ADMIN — INFLUENZA A VIRUS A/MICHIGAN/45/2015 X-275 (H1N1) ANTIGEN (FORMALDEHYDE INACTIVATED), INFLUENZA A VIRUS A/SINGAPORE/INFIMH-16-0019/2016 IVR-186 (H3N2) ANTIGEN (FORMALDEHYDE INACTIVATED), AND INFLUENZA B VIRUS B/MARYLAND/15/2016 BX-69A (A B/COLORADO/6/2017-LIKE VIRUS) ANTIGEN (FORMALDEHYDE INACTIVATED) 0.5 ML: 60; 60; 60 INJECTION, SUSPENSION INTRAMUSCULAR at 14:31

## 2018-09-28 RX ADMIN — FLUTICASONE PROPIONATE 1 SPRAY: 50 SPRAY, METERED NASAL at 08:54

## 2018-09-28 RX ADMIN — Medication 250 MG: at 08:47

## 2018-09-28 RX ADMIN — DOCUSATE SODIUM 100 MG: 100 CAPSULE, LIQUID FILLED ORAL at 18:06

## 2018-09-28 RX ADMIN — SITAGLIPTIN 50 MG: 50 TABLET, FILM COATED ORAL at 08:47

## 2018-09-28 RX ADMIN — SENNOSIDES 8.6 MG: 8.6 TABLET, FILM COATED ORAL at 08:48

## 2018-09-28 RX ADMIN — VITAMIN D, TAB 1000IU (100/BT) 1000 UNITS: 25 TAB at 08:47

## 2018-09-28 RX ADMIN — NYSTATIN: 100000 CREAM TOPICAL at 18:06

## 2018-09-28 RX ADMIN — DOCUSATE SODIUM 100 MG: 100 CAPSULE, LIQUID FILLED ORAL at 08:52

## 2018-09-28 RX ADMIN — FUROSEMIDE 20 MG: 20 TABLET ORAL at 08:47

## 2018-09-28 RX ADMIN — GLIPIZIDE 2.5 MG: 5 TABLET ORAL at 16:54

## 2018-09-28 RX ADMIN — ATORVASTATIN CALCIUM 40 MG: 40 TABLET, FILM COATED ORAL at 16:54

## 2018-09-28 RX ADMIN — NYSTATIN: 100000 CREAM TOPICAL at 08:55

## 2018-09-28 RX ADMIN — FINASTERIDE 5 MG: 5 TABLET, FILM COATED ORAL at 08:48

## 2018-09-28 RX ADMIN — OXYCODONE HYDROCHLORIDE AND ACETAMINOPHEN 500 MG: 500 TABLET ORAL at 08:48

## 2018-09-28 RX ADMIN — TAMSULOSIN HYDROCHLORIDE 0.4 MG: 0.4 CAPSULE ORAL at 16:54

## 2018-09-28 RX ADMIN — Medication 250 MG: at 18:06

## 2018-09-28 RX ADMIN — GLIPIZIDE 2.5 MG: 5 TABLET ORAL at 06:37

## 2018-09-28 RX ADMIN — INFLUENZA A VIRUS A/MICHIGAN/45/2015 X-275 (H1N1) ANTIGEN (FORMALDEHYDE INACTIVATED), INFLUENZA A VIRUS A/SINGAPORE/INFIMH-16-0019/2016 IVR-186 (H3N2) ANTIGEN (FORMALDEHYDE INACTIVATED), AND INFLUENZA B VIRUS B/MARYLAND/15/2016 BX-69A (A B/COLORADO/6/2017-LIKE VIRUS) ANTIGEN (FORMALDEHYDE INACTIVATED) 0.5 ML: 60; 60; 60 INJECTION, SUSPENSION INTRAMUSCULAR at 11:45

## 2018-09-28 RX ADMIN — FERROUS SULFATE TAB 325 MG (65 MG ELEMENTAL FE) 325 MG: 325 (65 FE) TAB at 06:37

## 2018-09-28 RX ADMIN — METOPROLOL SUCCINATE 50 MG: 50 TABLET, EXTENDED RELEASE ORAL at 08:47

## 2018-09-28 NOTE — PLAN OF CARE
Problem: OCCUPATIONAL THERAPY ADULT  Goal: Performs self-care activities at highest level of function for planned discharge setting  See evaluation for individualized goals  Treatment Interventions: ADL retraining, Functional transfer training, UE strengthening/ROM, Endurance training, Patient/family training, Cognitive reorientation, Equipment evaluation/education, Neuromuscular reeducation, Activityengagement  Equipment Recommended:  (TBD)       See flowsheet documentation for full assessment, interventions and recommendations  Outcome: Progressing  Limitation: Decreased ADL status, Decreased UE strength, Decreased cognition, Decreased endurance, Decreased self-care trans, Decreased high-level ADLs  Prognosis: Good  Assessment: Patient participated in Skilled OT session this date with interventions consisting of ADL re training with the use of correct body mechnaics, safety awareness and fall prevention techniques,  therapeutic activities to: increase activity tolerance and increase dynamic sit/ stand balance during functional activity    Patient agreeable to OT treatment session, upon arrival patient was found seated OOB to Chair  In comparison to previous session, patient with improvements in overall Act paco   Patient requiring ocassional safety reminders  Patient continues to be functioning below baseline level, occupational performance remains limited secondary to factors listed above and increased risk for falls and injury  From OT standpoint, recommendation at time of d/c would be Home OT and family pending progress  Patient to benefit from continued Occupational Therapy treatment while on TCF to address deficits as defined above and maximize level of functional independence with ADLs and functional mobility        OT Discharge Recommendation: Home OT  OT - OK to Discharge: No      Comments: Yoni Khan

## 2018-09-28 NOTE — SOCIAL WORK
SW received confirmation from [facility] in billing that patient's insurance covers at 100% from days 1-100

## 2018-09-28 NOTE — PROGRESS NOTES
Progress Note - Infectious Disease   Janay Davis 80 y o  male MRN: [id number]  Unit/Bed#: -01 Encounter: [id number]      Impression/Recommendations:  1   Enterococcal MV endocarditis  Diagnosed at [facility]  Unclear source  CT A/P showed bladder thickening with hydro suggesting ?  source but no urine cultures done  Persistent bacteremia which finally cleared  Small vegetation seen on [facility]  Now status post 6 weeks ampicillin/ceftriaxone  Rec:  ? Continue to follow closely off antibiotics  ? Check repeat blood cultures in 2 weeks     2   BPH with urinary retention  Chronic issue based on chart review  No clinical evidence of UTI  Rec:  ? Continue Simmons, Flomax, finasteride per urology     The patient is stable from an ID standpoint  Discussed in detail with the primary service  We will sign off for now  Please call with new questions  The patient does not need ID follow-up after D/C      Antibiotics:  Off antibiotics # 2    Subjective:  Patient seen on AM rounds  Denies fevers, chills, sweats, nausea, vomiting, or diarrhea  24 Hour Events:  No documented fevers, chills, sweats, nausea, vomiting, or diarrhea  Remains in RA  PICC removed this AM     Objective:  Vitals:  HR:  [84-85] 85  Resp:  [17-19] 19  BP: (108-132)/(54-63) 108/54  SpO2:  [91 %-98 %] 91 %  Temp (24hrs), Av 5 °F (33 1 °C), Min:77 °F (25 °C), Max:98 9 °F (37 2 °C)  Current: Temperature: (!) 77 °F (25 °C)    Physical Exam:   General:  No acute distress  Psychiatric:  Awake and alert  Pulmonary:  Normal respiratory excursion without accessory muscle use  Abdomen:  Soft, nontender  Extremities:  No edema  Skin:  No rashes    Lab Results:  I have personally reviewed pertinent labs      Results from last 7 days  Lab Units 18  0458 18  0503 18  0505  18  0522   SODIUM mmol/L 136* 137 139  < > 137   POTASSIUM mmol/L 4 5 4 7 4 6  < > 5 7*   CHLORIDE mmol/L 102 104 106  < > 105   CO2 mmol/L 29 28 26  < > 27   BUN mg/dL 30* 31* 37*  < > 48*   CREATININE mg/dL 1 16 1 33 1 59*  < > 2 36*   EGFR ml/min/1 73sq m 56* 47* 38*  < > 24*   CALCIUM mg/dL 8 2* 8 1* 7 8*  < > 8 4   AST U/L  --   --   --   --  20   ALT U/L  --   --   --   --  22   ALK PHOS U/L  --   --   --   --  59   < > = values in this interval not displayed  Results from last 7 days  Lab Units 09/28/18  0458 09/24/18  0523   WBC Thousand/uL 6 10 6 50   HEMOGLOBIN g/dL 8 1* 8 6*   PLATELETS Thousands/uL 187 213           Imaging Studies:   I have personally reviewed pertinent imaging study reports and images in PACS  EKG, Pathology, and Other Studies:   I have personally reviewed pertinent reports

## 2018-09-28 NOTE — CASE MANAGEMENT
Continued Stay Review    Date: 9/28/18  Auth# [id number]    Vital Signs: /63 (BP Location: Right arm)   Pulse 81   Temp 97 8 °F (36 6 °C) (Temporal)   Resp 19   Ht 5' 11" (1 803 m)   Wt 64 5 kg (142 lb 3 2 oz)   SpO2 99%   BMI 19 83 kg/m²     Medications:   Scheduled Meds:   Current Facility-Administered Medications:  acetaminophen 650 mg Oral Q6H PRN Malik Edmonds MD   amLODIPine 5 mg Oral Daily Juan Tovar MD   ascorbic acid 500 mg Oral Daily Monique Murillo, DREAD   atorvastatin 40 mg Oral Daily With Nelson Salas MD   benzonatate 200 mg Oral TID PRN Malik Edmonds MD   bisacodyl 10 mg Rectal Daily PRN Darion Arias, DREAD   cholecalciferol 1,000 Units Oral Daily Malik Edmonds MD   docusate sodium 100 mg Oral BID Malik Edmonds MD   ferrous sulfate 325 mg Oral Daily With Breakfast DREAD Teixeira   finasteride 5 mg Oral Daily Malik Edmonds MD   fluticasone 1 spray Nasal Daily Malik Edmonds MD   furosemide 20 mg Oral Daily Monique Murillo, DREAD   glipiZIDE 2 5 mg Oral BID AC Juan Tovar MD   metoprolol succinate 50 mg Oral Daily Malik Edmonds MD   nitroglycerin 0 4 mg Sublingual Q5 Min PRN Malik Edmonds MD   nystatin  Topical BID Juan Tovar MD   polyethylene glycol 17 g Oral Daily PRN Monique Woodyiniadriane, DREAD   saccharomyces boulardii 250 mg Oral BID Monique Ciminiadriane, DREAD   senna 1 tablet Oral Daily Malik Edmonds MD   senna-docusate sodium 1 tablet Oral BID PRN Monique Murillo, DREAD   sitaGLIPtin 50 mg Oral Daily Moniquedanny Murillo, DREAD   tamsulosin 0 4 mg Oral Daily With Pulhaydee Murillo, DREAD   tiotropium 18 mcg Inhalation Daily PRN Nehemias Elizabeth MD   tuberculin 5 Units Intradermal PRN Nehemias Elizabeth MD     PRN Meds: none used    Labs/Diagnostic Results: 9/28/18:  ; BUN 30; Gluc 69; Hgb 8 1; Hct 24 2    Age/Sex: 80 y o  male     Assessment/Plan: 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    Pt s/p JAKE having had IVF's from 9/24-9/27 with cr back to baseline  Due to SOB on 9/26 and LE edema was medicated with Lasix 20mg IV x1  Yesterday and restarted on 20mg PO this a m  No further medical issues presently  OT NOTE: 9/28Subjective/Goals: " I want to be able to walk without the walker"     OT total treatment time:47     Additional goals & Comments: Pt ref to PT re: above statement  Pt pleasant and motivated today  Attempted Sit ><Stand from toilet w/o safety handles 2nd Daughter questioned whether he needs them or not, pt unsuccessful  Edu grab bars or safety handles available  Pt performed Lite meal prep, when transported egg to table pt was asked "what did you forget to do?" pt immediately realized he had not shut off burner         09/28/18 1129   Restrictions/Precautions   Weight Bearing Precautions Per Order No   Other Precautions Cognitive; Fall Risk;Hard of hearing; Chair Alarm; Bed Alarm   Lifestyle   Reciprocal Relationships Family   Pain Assessment   Pain Assessment No/denies pain   Hospital Pain Intervention(s) Medication (See MAR); Repositioned; Emotional support   ADL   LB Dressing (MI to don socks and shoes)   Toileting Assistance  6  Modified independent   Toileting Deficit (CM for simulated toileting)      Meal Prep   Meal Prep Level (S for lite meal prep 1 v/c to turn stove off)   Light Housekeeping   Light Housekeeping Level (S for item retrieval and transport w/ RW tray)   Light Housekeeping (MI for dish washing )   Functional Standing Tolerance   Time 9 min   Comments Kitchen   Transfers   Sit to Stand 6  Modified independent   Additional items Armrests   Stand to Sit 6  Modified independent   Additional items Armrests   Stand pivot 5  Supervision   Additional items (RW)   Toilet transfer (Attempted Sit >< Stand w/o arm rests, unsuccessful)   Additional Comments F Dyn, F+ Static   Functional Mobility   Functional Mobility 5  Supervision   Additional items Rolling walker   Therapeutic Excerise-Strength   UE Strength (LUE  w/2# 30 curls, w/1# 3planes 30 reps)   Activity Tolerance   Activity Tolerance Patient tolerated treatment well   Assessment   Assessment Patient participated in Skilled OT session this date with interventions consisting of ADL re training with the use of correct body mechnaics, safety awareness and fall prevention techniques,  therapeutic activities to: increase activity tolerance and increase dynamic sit/ stand balance during functional activity    Patient agreeable to OT treatment session, upon arrival patient was found seated OOB to Chair  In comparison to previous session, patient with improvements in overall Act paco   Patient requiring ocassional safety reminders  Patient continues to be functioning below baseline level, occupational performance remains limited secondary to factors listed above and increased risk for falls and injury  From OT standpoint, recommendation at time of d/c would be Home OT and family pending progress  Patient to benefit from continued Occupational Therapy treatment while on TCF to address deficits as defined above and maximize level of functional independence with ADLs and functional mobility  Plan   Goal Expiration Date 10/05/18   Treatment Day 9   OT Frequency (6x/wk)   Recommendation   OT Discharge Recommendation Home OT   OT - OK to Discharge Lillie Atkinson, [facility], OT Occupational Therapist Addendum   Occupational Therapy Note Date of Service: 9/28/2018 12:26 PM         []Hide copied text  []Hover for attribution information  IVEY spoke with this OTR in regards to medication management that was mentioned in yesterday's care conference   Per IVEY, daughter would like pt to be able to manage his own medications       Please add goal: Pt will manage medications with MI using AE or compensatory strategies for increased safety       Sheryle Chambers, OT         Revision History                              Enmanuel Atkinson, [address] [place] [address] Therapy Assistant Signed   Plan of Care Date of Service: 9/28/2018 11:29 AM         Problem: OCCUPATIONAL THERAPY ADULT  Goal: Performs self-care activities at highest level of function for planned discharge setting  See evaluation for individualized goals  Treatment Interventions: ADL retraining, Functional transfer training, UE strengthening/ROM, Endurance training, Patient/family training, Cognitive reorientation, Equipment evaluation/education, Neuromuscular reeducation, Activityengagement  Equipment Recommended:  (TBD)       See flowsheet documentation for full assessment, interventions and recommendations  Outcome: Progressing  Limitation: Decreased ADL status, Decreased UE strength, Decreased cognition, Decreased endurance, Decreased self-care trans, Decreased high-level ADLs  Prognosis: Good  Assessment: Patient participated in Skilled OT session this date with interventions consisting of ADL re training with the use of correct body mechnaics, safety awareness and fall prevention techniques,  therapeutic activities to: increase activity tolerance and increase dynamic sit/ stand balance during functional activity    Patient agreeable to OT treatment session, upon arrival patient was found seated OOB to Chair  In comparison to previous session, patient with improvements in overall Act paco   Patient requiring ocassional safety reminders  Patient continues to be functioning below baseline level, occupational performance remains limited secondary to factors listed above and increased risk for falls and injury  From OT standpoint, recommendation at time of d/c would be Home OT and family pending progress  Patient to benefit from continued Occupational Therapy treatment while on TCF to address deficits as defined above and maximize level of functional independence with ADLs and functional mobility  OT Discharge Recommendation: Home OT  OT - OK to Discharge:  No        Comments: LONI Mitchell    PT NOTE 9/28:    Avila Bello PTA Physical Therapist Assistant Signed   Physical Therapy Note Date of Service: 9/28/2018  3:01 PM         []Hide copied text  []Waynever for attribution information               34' session       09/28/18 8586   Pain Assessment   Pain Assessment No/denies pain   Restrictions/Precautions   Other Precautions Cognitive; Chair Alarm; Fall Risk   General   Chart Reviewed Yes   Family/Caregiver Present No   Cognition   Overall Cognitive Status Impaired   Arousal/Participation Alert; Cooperative   Following Commands Follows one step commands without difficulty   Comments does not remember this  morning - amb to BR , chair alarm going off and pt holding catheter bag and dragging walker behind hin   Subjective   Subjective well ob viously I will be using this ( RW)) this works the best    [employer] R 6  Modified independent   Additional items (no rail and cues)   Rolling L 6  Modified independent   Additional items (no rail and cues)   Supine to Sit 6  Modified independent   Sit to Supine 6  Modified independent   Additional Comments no railand bed flat   Transfers   Sit to Stand 6  Modified independent   Stand to Sit 6  Modified independent   Stand pivot 6  Modified independent   Additional items (wit hRW , S  with no AD)   Ambulation/Elevation   Gait pattern Antalgic; Improper Weight shift;Scissoring  (with no AD)   Gait Assistance 5  Supervision   Additional items (with RW then CG to occ min A of 1 with no AD/ CG - cane)   Assistive Device (RW, cane and no AD)   Distance 82' x 4   (once with RW , 2x's with no AD and one time with cane)   Stair Management Assistance 5  Supervision   Additional items (close)   Stair Management Technique One rail R;Step to pattern  (2 hands on  one rail)   Curbs (1 + 1 with RW and close S and cues)   Balance   Ambulatory Poor +  ( with  no AD and F with RW)   Endurance Deficit   Endurance Deficit Yes   Activity Tolerance   Activity Tolerance Patient tolerated treatment well   Exercises   Neuro re-ed (restorator x 8')   Balance training  alternating step taps with no UE support, 10 x 2 with clsoe S / CG  (restorator x 8')   Assessment   Prognosis Good   Problem List Decreased strength;Decreased endurance; Impaired balance; Impaired judgement   Assessment Pt remains with occ confusion  Changes mind regarding using RW or not- but overall , needs to use RW -too unsafe and unsteady with cane and with no AD  Doing well with bed mobility and transfers   Goals   Patient Goals reports wanting to use RW then later reports - maybe I can waslk without it   LTG Expiration Date 10/04/18   Treatment Day 9   Plan   Treatment/Interventions (cont as per POC)   Progress Progressing toward goals   PT Frequency (6x/week)            Jl Garza PTA Physical Therapist Assistant Signed   Plan of Care Date of Service: 9/28/2018  3:02 PM         Problem: PHYSICAL THERAPY ADULT  Goal: Performs mobility at highest level of function for planned discharge setting  See evaluation for individualized goals  Treatment/Interventions: ADL retraining, Functional transfer training, LE strengthening/ROM, Elevations, Therapeutic exercise, Endurance training, Cognitive reorientation, Patient/family training, Equipment eval/education, Bed mobility, Gait training, Spoke to nursing, OT, Family, Spoke to case management (Speak to )  Equipment Recommended: Other (Comment) (To be determined)       See flowsheet documentation for full assessment, interventions and recommendations  Outcome: Progressing  Prognosis: Good  Problem List: Decreased strength, Decreased endurance, Impaired balance, Impaired judgement  Assessment: Pt remains with occ confusion  Changes mind regarding using RW or not- but overall , needs to use RW -too unsafe and unsteady with cane and with no AD   Doing well with bed mobility and transfers  Barriers to Discharge: Inaccessible home environment, Decreased caregiver support (Lives alone, 4 BRYANT home)     See flowsheet documentation for full assessment                       Discharge Plan: Noted from [address] held yesterday that the original amount of steps to navigate was said to be 2 BRYANT house however there is actually 4- PT adjusted goal for steps to 4    SW advised daughter of HHAs being typically $22-25/hour unless covered with [place] AAA  SW also advised of [facility] referral, dtr declines at this time but would be agreeable to take phone number  Information provided to daughter via email as requested  Daughter requested that OT add a goal of medication management as she is concerned patient will be prescribed many new medications  She is interested in purchasing patient a Mon-Sun pill box organizer or possible an automatic pill dispenser controlled by her smart phone  OT recommends a tub bench, BSC and RW  Pt remains unsteady without RW and goal is to walk without AD as pt has hardwood floors at home which is unsafe with a RW  Goal would be home without AD use and VNA when functionally stable      Jeaneth Pendleton RN  RN Care Manager/ TCF  [phone]  [email or web address]    **PLEASE NOTE ALL [facility] DETERMINATIONS SHOULD BE CALLED -[phone] NOT THE UTILIZATION REVIEW PHONE NUMBER ON ACCOMPANIED FAX COVER SHEET- Suellen Martin**

## 2018-09-28 NOTE — PLAN OF CARE
Problem: PHYSICAL THERAPY ADULT  Goal: Performs mobility at highest level of function for planned discharge setting  See evaluation for individualized goals  Treatment/Interventions: ADL retraining, Functional transfer training, LE strengthening/ROM, Elevations, Therapeutic exercise, Endurance training, Cognitive reorientation, Patient/family training, Equipment eval/education, Bed mobility, Gait training, Spoke to nursing, OT, Family, Spoke to case management (Speak to )  Equipment Recommended: Other (Comment) (To be determined)       See flowsheet documentation for full assessment, interventions and recommendations  Outcome: Progressing  Prognosis: Good  Problem List: Decreased strength, Decreased endurance, Impaired balance, Impaired judgement  Assessment: Pt remains with occ confusion  Changes mind regarding using RW or not- but overall , needs to use RW -too unsafe and unsteady with cane and with no AD  Doing well with bed mobility and transfers  Barriers to Discharge: Inaccessible home environment, Decreased caregiver support (Lives alone, 4 BRY[address] home)                See flowsheet documentation for full assessment

## 2018-09-28 NOTE — SOCIAL WORK
(Late note from yesterday's care conference on 9/27)  [facility] held with patient, patient's daughter Claudette Pares on speaker phone, PT/OT and DON  OT reports patient is supervision with LE dressing and clothing management  Patient is independent for bowel hygiene  Claudette Pares requested that OT add a goal of medication management as she is concerned patient will be prescribed many new medications  Claudette Pares is interested in purchasing patient a Mon-Sun pill box organizer or possible an automatic pill dispenser controlled by her smart phone  OT recommends a tub bench, BSC and RW  SW began to discuss insurance coverage for these when Claudette Pares stated "Can we please talk about this privately"  SW agreeable at this time  PT reports patient is able to ambulate 326 ft with RW and is unsteady without RW  Patient reports his goal is to walk without the RW  Claudette Benjamin agrees with this stating that patient has hardwood floors at home which would be unsafe with a RW  PT/OT stepped out of meeting at this point, so DON advised he will address this with PT however recommendation may still be for RW for safety  Patient and Claudette Pares requested at least one more week to 2 weeks to continue to work with increasing strength for patient to return home without an assistive device  Originally, patient had stated he had 2 BRY[address] home, but Claudette Pares reported to PT there is actually 4 BRYANT  Patient stated "the 2 extra steps are only 1 1/2 inches"  Claudette Pares stated "I am not going to argue with you Dad, but I know those steps are higher"  PT did adjust goal for patient to achieve 4 steps  Claudette Pares does not want patient discharged until he is safely able to do so, again reiterating that she would like 2 extra weeks for patient  SARA provided education to Claudette Pares at [facility] regarding insurance coverage and process as patient has a managed insurance  At this point, patient left room   Claudette Pares reports that she called the insurance and they do not cover "home care services"  SW educated Lux Rizo on the difference between VNA and HHA services  SW advised that typically, insurance will never cover HHA services so this would be accurate  Lux Rizo agrees, stating she believes this is what [employer] meant  SW advised HHAs are typically $22-25/hour unless covered with [facility] AAA  SW also advised of [facility] referral, dtr declines at this time but would be agreeable to take phone number  Lux Rizo provided with email and SW sent resources

## 2018-09-28 NOTE — OCCUPATIONAL THERAPY NOTE
Occupational Therapy Treatment Note    Name:  Zachery Tinsley   MRN:   [id number]  Age:     80 y o  Patient Active Problem List   Diagnosis    Endocarditis of mitral valve    Status post non-ST elevation myocardial infarction (NSTEMI)    Candida esophagitis (HCC)    Chronic diastolic (congestive) heart failure (HCC)    Type 2 diabetes mellitus without complication, without long-term current use of insulin (HCC)    Essential hypertension    Benign prostatic hyperplasia with urinary retention    CKD (chronic kidney disease), stage III    Atrial fibrillation (HCC)    Anemia    Coronary artery disease involving native coronary artery    JAKE (acute kidney injury) ([facility] [address] )     Endocarditis [I38]      Subjective/Goals: " I want to be able to walk without the walker"    OT total treatment time:47    Additional goals & Comments: Pt ref to PT re: above statement  Pt pleasant and motivated today  Attempted Sit ><Stand from toilet w/o safety handles 2nd Daughter questioned whether he needs them or not, pt unsuccessful  Edu grab bars or safety handles available  Pt performed Lite meal prep, when transported egg to table pt was asked "what did you forget to do?" pt immediately realized he had not shut off burner  09/28/18 1129   Restrictions/Precautions   Weight Bearing Precautions Per Order No   Other Precautions Cognitive; Fall Risk;Hard of hearing; Chair Alarm; Bed Alarm   Lifestyle   Reciprocal Relationships Family   Pain Assessment   Pain Assessment No/denies pain   Hospital Pain Intervention(s) Medication (See MAR); Repositioned; Emotional support   ADL   Toileting Assistance  6  Modified independent   Toileting Deficit (CM for simulated toileting)   Meal Prep   Meal Prep Level (S for lite meal prep 1 v/c to turn stove off)   Light Housekeeping   Light Housekeeping Level (S for item retrieval and transport w/ RW tray)   Light Housekeeping (MI for dish washing )   Functional Standing Tolerance   Time 9 min Comments Kitchen   Transfers   Sit to Stand 6  Modified independent   Additional items Armrests   Stand to Sit 6  Modified independent   Additional items Armrests   Stand pivot 5  Supervision   Additional items (RW)   Toilet transfer (Attempted Sit >< Stand w/o arm rests, unsuccessful)   Additional Comments F Dyn, F+ Static   Functional Mobility   Functional Mobility 5  Supervision   Additional items Rolling walker   Therapeutic Excerise-Strength   UE Strength (LUE  w/2# 30 curls, w/1# 3planes 30 reps)   Activity Tolerance   Activity Tolerance Patient tolerated treatment well   Assessment   Assessment Patient participated in Skilled OT session this date with interventions consisting of ADL re training with the use of correct body mechnaics, safety awareness and fall prevention techniques,  therapeutic activities to: increase activity tolerance and increase dynamic sit/ stand balance during functional activity    Patient agreeable to OT treatment session, upon arrival patient was found seated OOB to Chair  In comparison to previous session, patient with improvements in overall Act paco   Patient requiring ocassional safety reminders  Patient continues to be functioning below baseline level, occupational performance remains limited secondary to factors listed above and increased risk for falls and injury  From OT standpoint, recommendation at time of d/c would be Home OT and family pending progress  Patient to benefit from continued Occupational Therapy treatment while on TCF to address deficits as defined above and maximize level of functional independence with ADLs and functional mobility      Plan   Goal Expiration Date 10/05/18   Treatment Day 9   OT Frequency (6x/wk)   Recommendation   OT Discharge Recommendation Home OT   OT - OK to Discharge Lillie Mckeon

## 2018-09-28 NOTE — OCCUPATIONAL THERAPY NOTE
IVEY spoke with this OTR in regards to medication management that was mentioned in yesterday's care conference  Per IVEY, daughter would like pt to be able to manage his own medications  Please add goal: Pt will manage medications with MI using AE or compensatory strategies for increased safety       Anita Nieto, OT

## 2018-09-28 NOTE — PHYSICAL THERAPY NOTE
34' session     09/28/18 0000   Pain Assessment   Pain Assessment No/denies pain   Restrictions/Precautions   Other Precautions Cognitive; Chair Alarm; Fall Risk   General   Chart Reviewed Yes   Family/Caregiver Present No   Cognition   Overall Cognitive Status Impaired   Arousal/Participation Alert; Cooperative   Following Commands Follows one step commands without difficulty   Comments does not remember this  morning - amb to BR , chair alarm going off and pt holding catheter bag and dragging walker behind hin   Subjective   Subjective well ob viously I will be using this ( RW)) this works the best    [employer] R 6  Modified independent   Additional items (no rail and cues)   Rolling L 6  Modified independent   Additional items (no rail and cues)   Supine to Sit 6  Modified independent   Sit to Supine 6  Modified independent   Additional Comments no railand bed flat   Transfers   Sit to Stand 6  Modified independent   Stand to Sit 6  Modified independent   Stand pivot 6  Modified independent   Additional items (wit hRW , S  with no AD)   Ambulation/Elevation   Gait pattern Antalgic; Improper Weight shift;Scissoring  (with no AD)   Gait Assistance 5  Supervision   Additional items (with RW then CG to occ min A of 1 with no AD/ CG - cane)   Assistive Device (RW, cane and no AD)   Distance 82' x 4   (once with RW , 2x's with no AD and one time with cane)   Stair Management Assistance 5  Supervision   Additional items (close)   Stair Management Technique One rail R;Step to pattern  (2 hands on  one rail)   Curbs (1 + 1 with RW and close S and cues)   Balance   Ambulatory Poor +  ( with  no AD and F with RW)   Endurance Deficit   Endurance Deficit Yes   Activity Tolerance   Activity Tolerance Patient tolerated treatment well   Exercises   Neuro re-ed (restorator x 8')   Balance training  alternating step taps with no UE support, 10 x 2 with clsoe S / CG  (restorator x 8')   Assessment   Prognosis Good Problem List Decreased strength;Decreased endurance; Impaired balance; Impaired judgement   Assessment Pt remains with occ confusion  Changes mind regarding using RW or not- but overall , needs to use RW -too unsafe and unsteady with cane and with no AD   Doing well with bed mobility and transfers   Goals   Patient Goals reports wanting to use RW then later reports - maybe I can waslk without it   LTG Expiration Date 10/04/18   Treatment Day 9   Plan   Treatment/Interventions (cont as per POC)   Progress Progressing toward goals   PT Frequency (6x/week)

## 2018-09-28 NOTE — NURSING NOTE
No SOB today room air with POX 97% denied SOB / chest pain , +1 / 2 edema of lower extremities  On bed alarm impulsive got up without calling x 2   Simmons in place clear urine , flu vaccine was given today

## 2018-09-29 LAB — GLUCOSE SERPL-MCNC: 83 MG/DL (ref 70–99)

## 2018-09-29 PROCEDURE — 82948 REAGENT STRIP/BLOOD GLUCOSE: CPT

## 2018-09-29 PROCEDURE — 97116 GAIT TRAINING THERAPY: CPT

## 2018-09-29 PROCEDURE — 97530 THERAPEUTIC ACTIVITIES: CPT

## 2018-09-29 RX ADMIN — FERROUS SULFATE TAB 325 MG (65 MG ELEMENTAL FE) 325 MG: 325 (65 FE) TAB at 08:09

## 2018-09-29 RX ADMIN — FLUTICASONE PROPIONATE 1 SPRAY: 50 SPRAY, METERED NASAL at 10:24

## 2018-09-29 RX ADMIN — FUROSEMIDE 20 MG: 20 TABLET ORAL at 10:20

## 2018-09-29 RX ADMIN — Medication 250 MG: at 10:17

## 2018-09-29 RX ADMIN — ATORVASTATIN CALCIUM 40 MG: 40 TABLET, FILM COATED ORAL at 17:47

## 2018-09-29 RX ADMIN — DOCUSATE SODIUM 100 MG: 100 CAPSULE, LIQUID FILLED ORAL at 17:46

## 2018-09-29 RX ADMIN — VITAMIN D, TAB 1000IU (100/BT) 1000 UNITS: 25 TAB at 10:21

## 2018-09-29 RX ADMIN — METOPROLOL SUCCINATE 50 MG: 50 TABLET, EXTENDED RELEASE ORAL at 10:17

## 2018-09-29 RX ADMIN — NYSTATIN: 100000 CREAM TOPICAL at 10:23

## 2018-09-29 RX ADMIN — OXYCODONE HYDROCHLORIDE AND ACETAMINOPHEN 500 MG: 500 TABLET ORAL at 10:18

## 2018-09-29 RX ADMIN — SENNOSIDES 8.6 MG: 8.6 TABLET, FILM COATED ORAL at 10:18

## 2018-09-29 RX ADMIN — Medication 250 MG: at 17:50

## 2018-09-29 RX ADMIN — GLIPIZIDE 2.5 MG: 5 TABLET ORAL at 17:46

## 2018-09-29 RX ADMIN — TAMSULOSIN HYDROCHLORIDE 0.4 MG: 0.4 CAPSULE ORAL at 17:53

## 2018-09-29 RX ADMIN — AMLODIPINE BESYLATE 5 MG: 5 TABLET ORAL at 10:21

## 2018-09-29 RX ADMIN — DOCUSATE SODIUM 100 MG: 100 CAPSULE, LIQUID FILLED ORAL at 10:16

## 2018-09-29 RX ADMIN — GLIPIZIDE 2.5 MG: 5 TABLET ORAL at 08:08

## 2018-09-29 RX ADMIN — FINASTERIDE 5 MG: 5 TABLET, FILM COATED ORAL at 10:17

## 2018-09-29 RX ADMIN — SITAGLIPTIN 50 MG: 50 TABLET, FILM COATED ORAL at 10:21

## 2018-09-29 NOTE — PROGRESS NOTES
RECREATIONAL THERAPY PARTICIPATION LOG      ACTIVITY:    GAMES:        BINGO:        MUSIC STIM:        ARTS & CRAFTS:        EXERCISE:        CLUBS & MEETING:        SOCIALS: Socialization in room with family member  SPIRITUAL:        INDEPENDENT:        1:1:  Resident received a visit from a family member  JAKE Mathis

## 2018-09-29 NOTE — PLAN OF CARE
Problem: OCCUPATIONAL THERAPY ADULT  Goal: Performs self-care activities at highest level of function for planned discharge setting  See evaluation for individualized goals  Treatment Interventions: ADL retraining, Functional transfer training, UE strengthening/ROM, Endurance training, Patient/family training, Cognitive reorientation, Equipment evaluation/education, Neuromuscular reeducation, Activityengagement  Equipment Recommended:  (TBD)       See flowsheet documentation for full assessment, interventions and recommendations  Outcome: Progressing  Limitation: Decreased ADL status, Decreased UE strength, Decreased cognition, Decreased endurance, Decreased self-care trans, Decreased high-level ADLs  Prognosis: Good  Assessment: Patient participated in Skilled OT session this date with interventions consisting of ADL re training with the use of correct body mechnaics, safety awareness and fall prevention techniques,  therapeutic activities to: increase activity tolerance and increase dynamic sit/ stand balance during functional activity    Patient agreeable to OT treatment session, upon arrival patient was found seated OOB to Chair  In comparison to previous session, patient with improvements in overall Act paco   Patient requiring ocassional safety reminders  Patient continues to be functioning below baseline level, occupational performance remains limited secondary to factors listed above and increased risk for falls and injury  From OT standpoint, recommendation at time of d/c would be Home OT and family pending progress  Patient to benefit from continued Occupational Therapy treatment while on TCF to address deficits as defined above and maximize level of functional independence with ADLs and functional mobility        OT Discharge Recommendation: Home OT  OT - OK to Discharge: No

## 2018-09-29 NOTE — NURSING NOTE
Pt awake, can be forgetful with alarm on  No c/o chest pain or respiratory distress  Ambulates in room with minimal assist  Sleeping in bed for a short period of time today  Continue plan  Of care  Radha Morton

## 2018-09-29 NOTE — PHYSICAL THERAPY NOTE
25 minute treatment       09/29/18 1338   Pain Assessment   Pain Assessment No/denies pain   Pain Score No Pain   General   Chart Reviewed Yes   Response to Previous Treatment Patient with no complaints from previous session  Family/Caregiver Present No   Cognition   Overall Cognitive Status Impaired   Following Commands Follows multistep commands with increased time or repetition   Subjective   Subjective Pt questioning if he is able to walk in the cheng by himself w/ RW-advised pt that we will issue him an Independent sign when we feel this is appropriate   Transfers   Sit to Stand 6  Modified independent   Additional items Armrests; Increased time required   Stand to Sit 6  Modified independent   Additional items Increased time required;Armrests   Stand pivot 6  Modified independent   Additional items (RW support)   Ambulation/Elevation   Gait pattern Improper Weight shift;Decreased foot clearance   Gait Assistance 5  Supervision  (CS w/o AD)   Assistive Device Rolling walker;None   Distance 200', 150' w/ RW and 30' w/o AD   Stair Management Technique One rail R;Step to pattern  (2 hands on 1 rail)   Number of Stairs 2   Curbs up and down 2 curb steps(1+1) w/ RW and S  Performed 2 x    Activity Tolerance   Activity Tolerance Patient tolerated treatment well   Assessment   Prognosis Good   Problem List Decreased strength;Decreased endurance; Impaired balance; Impaired judgement   Assessment Pt  Seen for PT treatment  Pts gait appears to be steadier today  No LOB w/ amb w/ or w/o AD  Slightly unsteady w/o AD    PT did well on stair/curb performance   Goals   Patient Goals tro walk w/o assistance   LTG Expiration Date 10/04/18   Treatment Day 10   Plan   Treatment/Interventions (Continue per plan of care)   Progress Progressing toward goals   PT Frequency (6x/week)   Howard Pac, PTA

## 2018-09-29 NOTE — NURSING NOTE
Pt  Currently resting in room  VSS  SCDs on  Simmons draining dark yellow urine with minimal sediment  Pt's daughter just left bedside  Pt  Has no complaints to offer at this time  No current complaints of pain, CP, or SOB  Pt  Left with all needs within reach, bed in lowest position, side rails adjusted as appropriate  Will continue to monitor

## 2018-09-29 NOTE — OCCUPATIONAL THERAPY NOTE
Occupational Therapy Treatment Note    Name:  Luc Kwon   MRN:   [id number]  Age:     80 y o  Patient Active Problem List   Diagnosis    Endocarditis of mitral valve    Status post non-ST elevation myocardial infarction (NSTEMI)    Candida esophagitis (HCC)    Chronic diastolic (congestive) heart failure (HCC)    Type 2 diabetes mellitus without complication, without long-term current use of insulin (HCC)    Essential hypertension    Benign prostatic hyperplasia with urinary retention    CKD (chronic kidney disease), stage III    Atrial fibrillation (HCC)    Anemia    Coronary artery disease involving native coronary artery    JAKE (acute kidney injury) ([facility] [address] )     Endocarditis [I38]      Subjective/Goals: "i'd like to be able to walk without that thing"-- patient referring to walker as he just returned from PT prior to OT session    Vitals: see summary sheet    OT total treatment time: (1901-9425) 26 min     No C/o pain this session  Medication Management:  · Patient seen this PM with focus on medication management which was a new goal added post care conference held this week  · Opening containers: patient able to open all 4 types of containers provided in therapy medication kit without issue  · Patient reports use of a week long pill container indicating that he gets his scripts from  and tejinder then at [facility]  · Patient states that he is the one to organize his containers and that all his medication dosen't often fit as he takes several   When IVEY asked patient what he does when this happens he states that he places them there but "doesnt close section"  New pill container may be needed to allow for management of all pills  · Patient was then provided with his medication list from nursing however he was unable to state what most pill where indicating that he goes mainly by vision with a few and "i just know what I need to take but not what they are always for"    In review patient was able to recognize his vitamin C and D3 indicating its benefit along with lipitor, colace and ferrous sulfate  Medications for diabetic, prostate and BP, Heat where reviewed as well  Patient unable to tell IVEY when he takes these medications  · Practice medications where then given to patient for him to indicate when and how many pills to take-- of the 3 bottles given patient was accurate on reading labels and indicating the millagrams to take  Overall min cues needed  · Recommend patient given medications (practice bottles with his medications listed along with MG and times) for him to fill out pill container accordingly as this was not completed this session  Patient plans for a d/c home alone therefore continued OT is recommended at this time with goals set by OTR  At end of treatment session patient remains in room with all needs within reach      Conner Camara  9/29/2018

## 2018-09-29 NOTE — PLAN OF CARE
Problem: PHYSICAL THERAPY ADULT  Goal: Performs mobility at highest level of function for planned discharge setting  See evaluation for individualized goals  Treatment/Interventions: ADL retraining, Functional transfer training, LE strengthening/ROM, Elevations, Therapeutic exercise, Endurance training, Cognitive reorientation, Patient/family training, Equipment eval/education, Bed mobility, Gait training, Spoke to nursing, OT, Family, Spoke to case management (Speak to )  Equipment Recommended: Other (Comment) (To be determined)       See flowsheet documentation for full assessment, interventions and recommendations  Outcome: Progressing  Prognosis: Good  Problem List: Decreased strength, Decreased endurance, Impaired balance, Impaired judgement  Assessment: Pt  Seen for PT treatment  Pts gait appears to be steadier today  No LOB w/ amb w/ or w/o AD  Slightly unsteady w/o AD  PT did well on stair/curb performance  Barriers to Discharge: [address] home environment, Decreased caregiver support (Lives alone, 4 [address] home)                See flowsheet documentation for full assessment

## 2018-09-30 RX ADMIN — ATORVASTATIN CALCIUM 40 MG: 40 TABLET, FILM COATED ORAL at 17:29

## 2018-09-30 RX ADMIN — NYSTATIN: 100000 CREAM TOPICAL at 09:57

## 2018-09-30 RX ADMIN — METOPROLOL SUCCINATE 50 MG: 50 TABLET, EXTENDED RELEASE ORAL at 09:56

## 2018-09-30 RX ADMIN — FINASTERIDE 5 MG: 5 TABLET, FILM COATED ORAL at 09:57

## 2018-09-30 RX ADMIN — DOCUSATE SODIUM 100 MG: 100 CAPSULE, LIQUID FILLED ORAL at 17:29

## 2018-09-30 RX ADMIN — TAMSULOSIN HYDROCHLORIDE 0.4 MG: 0.4 CAPSULE ORAL at 17:29

## 2018-09-30 RX ADMIN — GLIPIZIDE 2.5 MG: 5 TABLET ORAL at 17:29

## 2018-09-30 RX ADMIN — FERROUS SULFATE TAB 325 MG (65 MG ELEMENTAL FE) 325 MG: 325 (65 FE) TAB at 07:58

## 2018-09-30 RX ADMIN — SITAGLIPTIN 50 MG: 50 TABLET, FILM COATED ORAL at 09:56

## 2018-09-30 RX ADMIN — Medication 250 MG: at 17:29

## 2018-09-30 RX ADMIN — OXYCODONE HYDROCHLORIDE AND ACETAMINOPHEN 500 MG: 500 TABLET ORAL at 09:57

## 2018-09-30 RX ADMIN — Medication 250 MG: at 09:57

## 2018-09-30 RX ADMIN — FLUTICASONE PROPIONATE 1 SPRAY: 50 SPRAY, METERED NASAL at 09:57

## 2018-09-30 RX ADMIN — FUROSEMIDE 20 MG: 20 TABLET ORAL at 09:56

## 2018-09-30 RX ADMIN — VITAMIN D, TAB 1000IU (100/BT) 1000 UNITS: 25 TAB at 09:57

## 2018-09-30 RX ADMIN — DOCUSATE SODIUM 100 MG: 100 CAPSULE, LIQUID FILLED ORAL at 09:56

## 2018-09-30 RX ADMIN — NYSTATIN 1 APPLICATION: 100000 CREAM TOPICAL at 17:30

## 2018-09-30 RX ADMIN — GLIPIZIDE 2.5 MG: 5 TABLET ORAL at 07:59

## 2018-10-01 PROBLEM — R54 SENILE DEBILITY: Status: ACTIVE | Noted: 2018-10-01

## 2018-10-01 PROCEDURE — 99309 SBSQ NF CARE MODERATE MDM 30: CPT | Performed by: INTERNAL MEDICINE

## 2018-10-01 PROCEDURE — 97530 THERAPEUTIC ACTIVITIES: CPT

## 2018-10-01 PROCEDURE — 97110 THERAPEUTIC EXERCISES: CPT

## 2018-10-01 PROCEDURE — 97116 GAIT TRAINING THERAPY: CPT

## 2018-10-01 RX ORDER — ASPIRIN 81 MG/1
81 TABLET ORAL DAILY
Status: DISCONTINUED | OUTPATIENT
Start: 2018-10-02 | End: 2018-10-04

## 2018-10-01 RX ORDER — GLIPIZIDE 5 MG/1
2.5 TABLET ORAL
Status: DISCONTINUED | OUTPATIENT
Start: 2018-10-02 | End: 2018-10-04 | Stop reason: HOSPADM

## 2018-10-01 RX ADMIN — FINASTERIDE 5 MG: 5 TABLET, FILM COATED ORAL at 09:35

## 2018-10-01 RX ADMIN — DOCUSATE SODIUM 100 MG: 100 CAPSULE, LIQUID FILLED ORAL at 09:35

## 2018-10-01 RX ADMIN — VITAMIN D, TAB 1000IU (100/BT) 1000 UNITS: 25 TAB at 09:35

## 2018-10-01 RX ADMIN — AMLODIPINE BESYLATE 5 MG: 5 TABLET ORAL at 09:36

## 2018-10-01 RX ADMIN — NYSTATIN: 100000 CREAM TOPICAL at 17:54

## 2018-10-01 RX ADMIN — OXYCODONE HYDROCHLORIDE AND ACETAMINOPHEN 500 MG: 500 TABLET ORAL at 09:35

## 2018-10-01 RX ADMIN — TAMSULOSIN HYDROCHLORIDE 0.4 MG: 0.4 CAPSULE ORAL at 16:36

## 2018-10-01 RX ADMIN — FERROUS SULFATE TAB 325 MG (65 MG ELEMENTAL FE) 325 MG: 325 (65 FE) TAB at 08:12

## 2018-10-01 RX ADMIN — Medication 250 MG: at 17:54

## 2018-10-01 RX ADMIN — ATORVASTATIN CALCIUM 40 MG: 40 TABLET, FILM COATED ORAL at 16:36

## 2018-10-01 RX ADMIN — GLIPIZIDE 2.5 MG: 5 TABLET ORAL at 07:02

## 2018-10-01 RX ADMIN — GLIPIZIDE 2.5 MG: 5 TABLET ORAL at 16:36

## 2018-10-01 RX ADMIN — FUROSEMIDE 20 MG: 20 TABLET ORAL at 09:37

## 2018-10-01 RX ADMIN — SITAGLIPTIN 50 MG: 50 TABLET, FILM COATED ORAL at 09:35

## 2018-10-01 RX ADMIN — SENNOSIDES 8.6 MG: 8.6 TABLET, FILM COATED ORAL at 09:36

## 2018-10-01 RX ADMIN — METOPROLOL SUCCINATE 50 MG: 50 TABLET, EXTENDED RELEASE ORAL at 09:35

## 2018-10-01 RX ADMIN — NYSTATIN: 100000 CREAM TOPICAL at 09:37

## 2018-10-01 RX ADMIN — FLUTICASONE PROPIONATE 1 SPRAY: 50 SPRAY, METERED NASAL at 09:37

## 2018-10-01 RX ADMIN — Medication 250 MG: at 09:35

## 2018-10-01 NOTE — PHYSICAL THERAPY NOTE
45' session     10/01/18 1202   Pain Assessment   Pain Assessment No/denies pain   Restrictions/Precautions   Other Precautions Cognitive; Chair Alarm; Fall Risk   General   Chart Reviewed Yes   Family/Caregiver Present No   Cognition   Overall Cognitive Status Impaired   Arousal/Participation Alert; Cooperative   Attention Within functional limits   Following Commands Follows one step commands without difficulty   Subjective   Subjective so I th ink  once a person is here awhile they should ne able to walk on their own ( again discussed I sign and that pt is not yet ready for one)   Transfers   Sit to Stand 6  Modified independent   Stand to Sit 5  Supervision   Additional items (occ flop into chair with decrease control)   Stand pivot 6  Modified independent   Additional items (with RW and S with no AD)   Toilet transfer (manages clothes with  S)   Ambulation/Elevation   Gait pattern Improper Weight shift;Decreased foot clearance;Decreased R stance;Scissoring  (occ scissoring with no AD - veers off straight path)   Gait Assistance 5  Supervision   Additional items (with RW adn occ min A  with no AD)   Assistive Device Rolling walker  (and no AD)   Distance (230' with RW then 80' and 76' with no AD)   Stair Management Assistance 5  Supervision   Stair Management Technique One rail R;Step to pattern   Number of Stairs 2   Curbs (2  curb steps with RW and S )   Balance   Dynamic Standing Poor +  (with decrease UE support)   Ambulatory (P+ with no AD and F with RW)   Activity Tolerance   Activity Tolerance Patient tolerated treatment well   Exercises   Hip Flexion (2# alternating step taps with no UE support , 10 x 2 occ min)   Hip Abduction (isometrics x 30)   Hip Adduction (ball squeeze x 30)   Knee AROM Long Arc Quad (2# x 30)   Balance training  side stepping, retro amb, tandem stand ( foot to side, not tin front) with head turns and  nods   Pt with occc LOB requiring min A of 1   Assessment   Prognosis Fair Problem List Decreased strength; Impaired balance;Decreased safety awareness   Assessment Pt requests to have I sign , but explained that he is not safe enough for it at this time  Pt cont to have + LOB with challenges and with  amb with no AD and decrease safety awareness  Pt is doing well with the steps( curb and 2 consecutive) Gt with no AD is wavering , with occ  Scissoring and LOB - gt is antalgic with decrease stance on the R  Increase LOB with SLS on R     Goals   Patient Goals walk by myself   [facility] Expiration Date 10/04/18   Treatment Day 11   Plan   Treatment/Interventions (cont as per POC)   Progress Progressing toward goals   PT Frequency (6x/week)

## 2018-10-01 NOTE — PROGRESS NOTES
REHAB Progress Note - Jaden Pittman 6/21/1929, 80 y o  male MRN: [id number]    Unit/Bed#: -01 Encounter: [id number]    Primary Care Provider: Kim Moreira MD   Date and time admitted to hospital: 9/18/2018  6:15 PM        Assessment and Plan  * Senile debility   Assessment & Plan    Continue PT/OT  Current rehab stay 13 days  JAKE (acute kidney injury) ([facility] [address] )   Assessment & Plan    Acute kidney injury thought secondary to diuretics and ARB  Was hydrated gently during initial part of rehab stay  Now stable on furosemide 20 mg daily  Coronary artery disease involving native coronary artery   Assessment & Plan    Continue metoprolol, norvasc, and statin       Atrial fibrillation [facility])   Assessment & Plan    Paroxysmal atrial fibrillation  Not anticoagulation candidate given recurrent anemia  Follow up with Cardiology outpatient  Continue metoprolol succinate  Patient was on aspirin EC 81 mg during hospitalization LVH, will restart     Benign prostatic hyperplasia with urinary retention   Assessment & Plan    Reviewed notes from urology 9/25/2018 by Dr Polly Carr  Failed voiding trial on multiple occasions  Continue tamsulosin and finasteride and maintain urinary catheter until outpatient follow-up for voiding trial +/- TURP     Essential hypertension   Assessment & Plan    Blood pressure stable on metoprolol amlodipine and restarting of furosemide  Continue to hold ARB given acute kidney injury     Type 2 diabetes mellitus without complication, without long-term current use of insulin (HCC)   Assessment & Plan    Lab Results   Component Value Date    HGBA1C 6 6 (H) 09/19/2018     Stable; glargine discontinued at [facility] hospitalization due to hypoglycemia  Noted to have low and glucose on BMP  Will change glipizide 2 5 mg b i d  to daily  Can continue Januvia       Chronic diastolic (congestive) heart failure (HCC)   Assessment & Plan    Compensated on current dose of furosemide 20 mg daily      Endocarditis of mitral valve   Assessment & Plan    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 hospitalization/records    Current Length of Stay: 13 day(s)    Current Patient Status: SNF Short Term Inpatient     Code Status: Level 1 - Full Code  ______________________________________________________________________________    Subjective:   Patient seen and examined  No new complaints  No chest pain shortness of breath  Objective:   Vitals: Blood pressure (!) 101/49, pulse 88, temperature 98 1 °F (36 7 °C), temperature source Temporal, resp  rate 19, height 5' 11" (1 803 m), weight 63 kg (138 lb 14 2 oz), SpO2 98 %      Physical Exam:   General appearance: alert, appears stated age and cooperative  Head: Normocephalic, without obvious abnormality, atraumatic  Lungs: diminished breath sounds  Heart: irregularly irregular rhythm  Abdomen: soft, non-tender, positive bowel sounds   Back: negative, range of motion normal  Extremities: edema +1 lower extremities bilaterally  Neurologic: Grossly normal    Additional Data:   Labs:    Results from last 7 days  Lab Units 09/28/18  0458   WBC Thousand/uL 6 10   HEMOGLOBIN g/dL 8 1*   HEMATOCRIT % 24 2*   MCV fL 92   BANDS PCT % 1   PLATELETS Thousands/uL 187       Results from last 7 days  Lab Units 09/28/18  0458 09/27/18  0503 09/26/18  0505 09/25/18  0507   SODIUM mmol/L 136* 137 139 140   POTASSIUM mmol/L 4 5 4 7 4 6 4 6   CHLORIDE mmol/L 102 104 106 106   CO2 mmol/L 29 28 26 26   ANION GAP mmol/L 5 5 7 8   BUN mg/dL 30* 31* 37* 43*   CREATININE mg/dL 1 16 1 33 1 59* 1 91*   CALCIUM mg/dL 8 2* 8 1* 7 8* 7 6*   EGFR ml/min/1 73sq m 56* 47* 38* 30*   GLUCOSE RANDOM mg/dL 69* 99 72 46*                        Results from last 7 days  Lab Units 09/29/18  0635   POC GLUCOSE mg/dl 83             * I Have Reviewed All Lab Data Listed Above  Cultures:           Imaging:  Imaging Reports Reviewed Today Include:   No results found        Scheduled Meds:  Current Facility-Administered Medications:  acetaminophen 650 mg Oral Q6H PRN Ana Cristina Albrecht MD   amLODIPine 5 mg Oral Daily Janay Murray MD   ascorbic acid 500 mg Oral Daily DREAD Cervantes   atorvastatin 40 mg Oral Daily With Rito Adams MD   benzonatate 200 mg Oral TID PRN Ana Cristina Albrecht MD   bisacodyl 10 mg Rectal Daily PRN DREAD Terry   cholecalciferol 1,000 Units Oral Daily Ana Cristina Albrecht MD   docusate sodium 100 mg Oral BID Ana Cristina Albrecht MD   ferrous sulfate 325 mg Oral Daily With Breakfast DREAD Cervantes   finasteride 5 mg Oral Daily Ana Cristina Albrecht MD   fluticasone 1 spray Nasal Daily Ana Cristina Albrecht MD   furosemide 20 mg Oral Daily DREAD Cervantes   glipiZIDE 2 5 mg Oral BID AC Janay Murray MD   metoprolol succinate 50 mg Oral Daily Ana Cristina Albrecht MD   nitroglycerin 0 4 mg Sublingual Q5 Min PRN Ana Cristina Albrecht MD   nystatin  Topical BID Janay Murray MD   polyethylene glycol 17 g Oral Daily PRN MoniqueDREAD Magallanes   saccharomyces boulardii 250 mg Oral BID MoniqueDREAD Magallanes   senna 1 tablet Oral Daily Ana Cristina Albrecht MD   senna-docusate sodium 1 tablet Oral BID PRN MoniqueRDEAD Magallanes   sitaGLIPtin 50 mg Oral Daily MoniqueDREAD Magallanes   tamsulosin 0 4 mg Oral Daily With [employer] DREAD Murillo   tiotropium 18 mcg Inhalation Daily PRN Pj Verma MD   tuberculin 5 Units Intradermal PRN MD Dayna Talavera DO [address] Internal Medicine  Hospitalist    ** Please Note: This note has been constructed using a voice recognition system   **

## 2018-10-01 NOTE — ASSESSMENT & PLAN NOTE
Acute kidney injury thought secondary to diuretics and ARB  Was hydrated gently during initial part of rehab stay  Now stable on furosemide 20 mg daily

## 2018-10-01 NOTE — ASSESSMENT & PLAN NOTE
Paroxysmal atrial fibrillation  Not anticoagulation candidate given recurrent anemia  Follow up with Cardiology outpatient  Continue metoprolol succinate    Patient was on aspirin EC 81 mg during hospitalization LVH, will restart

## 2018-10-01 NOTE — ASSESSMENT & PLAN NOTE
Reviewed notes from urology 9/25/2018 by Dr Irasema Marques  Failed voiding trial on multiple occasions    Continue tamsulosin and finasteride and maintain urinary catheter until outpatient follow-up for voiding trial +/- TURP

## 2018-10-01 NOTE — OCCUPATIONAL THERAPY NOTE
Occupational Therapy Treatment Note    Name:  Cameron Bahena   MRN:   [id number]  Age:     80 y o  Patient Active Problem List   Diagnosis    Endocarditis of mitral valve    Status post non-ST elevation myocardial infarction (NSTEMI)    Candida esophagitis (HCC)    Chronic diastolic (congestive) heart failure (HCC)    Type 2 diabetes mellitus without complication, without long-term current use of insulin (HCC)    Essential hypertension    Benign prostatic hyperplasia with urinary retention    CKD (chronic kidney disease), stage III (HCC)    Atrial fibrillation (HCC)    Anemia    Coronary artery disease involving native coronary artery    JAKE (acute kidney injury) ([facility] [address] )     Endocarditis [I38]      Subjective/Goals: "i think I'm only here for Tues and Wednesday yet"  Per  notes this information to be accurate [facility] patient this PM however plans are to appeal     Vitals:  See nursing summary sheet  Pain:  No reports of pain    OT total treatment time:  (5302-9137) 42 min    Additional goals & Comments:  · Standing tolerance: 7 min with mobility  · Declined toileting need therefore not assessed  · Transfers:  Sit to stand MI, stand to sit MI (supervision safety)  · Functional mobility:  Supervision with walker  · Balance:  Dynamic Fair, Static Fair+    Main focus on medication management:  Patient had some difficulty filling pill container for week given 4 medications  Moderate cues needed with increased time however when mistakes where reviewed patient had a hard time understanding his errors  · Initially patient had a hard time starting task given directions and pill container provided  Upon education patient initiated  · Patient filled out 1/4 meds without cues with this med being one taking each AM   · Increased confusion arrived when patient needed to distribute medications in both AM/PM or around meals    In this case patient filled meds at bedtime slot (cause it was available) and doubled up on meds or occasionally missed a section  · Patient was able to read directions and answer questions with min cues relating to medication management but with a hard time problem solving "what would you do if" scenarios   · Patient was able to manage pill containers and med box without issue  · Min droppage of pills therefore fairly good [address] noted  Overall recommendations with medication management is to have assistance with family to organize weekly meds with patient  Patient also unfamiliar with some medications presently taken therefore having family and nursing continue to review would be beneficial   NOTE per nursing med list patient takes several medications and trial this date was for only 4 to which assist and cues where needed  At end of session patient remains in room with all needs within reach  Chair alarm in place      Dana Read  10/1/2018

## 2018-10-01 NOTE — ASSESSMENT & PLAN NOTE
Recently hospitalized at [address] [place] [address] for enterococcus endocarditis  Finished course of ceftriaxone and ampicillin 9/26/2018  PICC line removed

## 2018-10-01 NOTE — SOCIAL WORK
Patient received a [employer] from his insurance today with the last cover day on 10/3  SW met with patient to discuss, patient would like to appeal [employer]  SW agreeable and will assist patient to office to appeal this afternoon  Patient signed [employer] and SW faxedd to [place] [facility], copy given to CNA to scan into chart

## 2018-10-01 NOTE — PLAN OF CARE
Problem: PHYSICAL THERAPY ADULT  Goal: Performs mobility at highest level of function for planned discharge setting  See evaluation for individualized goals  Treatment/Interventions: ADL retraining, Functional transfer training, LE strengthening/ROM, Elevations, Therapeutic exercise, Endurance training, Cognitive reorientation, Patient/family training, Equipment eval/education, Bed mobility, Gait training, Spoke to nursing, OT, Family, Spoke to case management (Speak to )  Equipment Recommended: Other (Comment) (To be determined)       See flowsheet documentation for full assessment, interventions and recommendations  Outcome: Progressing  Prognosis: Fair  Problem List: Decreased strength, Impaired balance, Decreased safety awareness  Assessment: Pt requests to have I sign , but explained that he is not safe enough for it at this time  Pt cont to have + LOB with challenges and with  amb with no AD and decrease safety awareness  Pt is doing well with the steps( curb and 2 consecutive) Gt with no AD is wavering , with occ  Scissoring and LOB - gt is antalgic with decrease stance on the R  Increase LOB with SLS on R   Barriers to Discharge: [address] home environment, Decreased caregiver support (Lives alone, 4 [address] home)                See flowsheet documentation for full assessment

## 2018-10-01 NOTE — ASSESSMENT & PLAN NOTE
Blood pressure stable on metoprolol amlodipine and restarting of furosemide    Continue to hold ARB given acute kidney injury

## 2018-10-01 NOTE — ASSESSMENT & PLAN NOTE
Lab Results   Component Value Date    HGBA1C 6 6 (H) 09/19/2018     Stable; glargine discontinued at [facility] hospitalization due to hypoglycemia  Noted to have low and glucose on BMP  Will change glipizide 2 5 mg b i d  to daily  Can continue Januvia

## 2018-10-02 LAB
ANION GAP SERPL CALCULATED.3IONS-SCNC: 7 MMOL/L (ref 5–14)
BUN SERPL-MCNC: 44 MG/DL (ref 5–25)
CALCIUM SERPL-MCNC: 9 MG/DL (ref 8.4–10.2)
CHLORIDE SERPL-SCNC: 100 MMOL/L (ref 97–108)
CO2 SERPL-SCNC: 28 MMOL/L (ref 22–30)
CREAT SERPL-MCNC: 1.37 MG/DL (ref 0.7–1.5)
GFR SERPL CREATININE-BSD FRML MDRD: 45 ML/MIN/1.73SQ M
GLUCOSE P FAST SERPL-MCNC: 68 MG/DL (ref 70–99)
GLUCOSE SERPL-MCNC: 68 MG/DL (ref 70–99)
GLUCOSE SERPL-MCNC: 82 MG/DL (ref 70–99)
POTASSIUM SERPL-SCNC: 5 MMOL/L (ref 3.6–5)
SODIUM SERPL-SCNC: 135 MMOL/L (ref 137–147)

## 2018-10-02 PROCEDURE — 97530 THERAPEUTIC ACTIVITIES: CPT

## 2018-10-02 PROCEDURE — 80048 BASIC METABOLIC PNL TOTAL CA: CPT | Performed by: NURSE PRACTITIONER

## 2018-10-02 PROCEDURE — 82948 REAGENT STRIP/BLOOD GLUCOSE: CPT

## 2018-10-02 PROCEDURE — 97110 THERAPEUTIC EXERCISES: CPT

## 2018-10-02 PROCEDURE — 97535 SELF CARE MNGMENT TRAINING: CPT

## 2018-10-02 PROCEDURE — 97116 GAIT TRAINING THERAPY: CPT

## 2018-10-02 RX ADMIN — TAMSULOSIN HYDROCHLORIDE 0.4 MG: 0.4 CAPSULE ORAL at 17:05

## 2018-10-02 RX ADMIN — FUROSEMIDE 20 MG: 20 TABLET ORAL at 09:06

## 2018-10-02 RX ADMIN — Medication 250 MG: at 17:05

## 2018-10-02 RX ADMIN — NYSTATIN: 100000 CREAM TOPICAL at 11:20

## 2018-10-02 RX ADMIN — OXYCODONE HYDROCHLORIDE AND ACETAMINOPHEN 500 MG: 500 TABLET ORAL at 09:06

## 2018-10-02 RX ADMIN — FLUTICASONE PROPIONATE 1 SPRAY: 50 SPRAY, METERED NASAL at 11:18

## 2018-10-02 RX ADMIN — ASPIRIN 81 MG: 81 TABLET, COATED ORAL at 09:06

## 2018-10-02 RX ADMIN — DOCUSATE SODIUM 100 MG: 100 CAPSULE, LIQUID FILLED ORAL at 09:06

## 2018-10-02 RX ADMIN — SENNOSIDES 8.6 MG: 8.6 TABLET, FILM COATED ORAL at 09:05

## 2018-10-02 RX ADMIN — METOPROLOL SUCCINATE 50 MG: 50 TABLET, EXTENDED RELEASE ORAL at 09:07

## 2018-10-02 RX ADMIN — FINASTERIDE 5 MG: 5 TABLET, FILM COATED ORAL at 09:06

## 2018-10-02 RX ADMIN — DOCUSATE SODIUM 100 MG: 100 CAPSULE, LIQUID FILLED ORAL at 17:06

## 2018-10-02 RX ADMIN — FERROUS SULFATE TAB 325 MG (65 MG ELEMENTAL FE) 325 MG: 325 (65 FE) TAB at 07:48

## 2018-10-02 RX ADMIN — SITAGLIPTIN 50 MG: 50 TABLET, FILM COATED ORAL at 09:05

## 2018-10-02 RX ADMIN — VITAMIN D, TAB 1000IU (100/BT) 1000 UNITS: 25 TAB at 09:05

## 2018-10-02 RX ADMIN — Medication 250 MG: at 09:05

## 2018-10-02 RX ADMIN — GLIPIZIDE 2.5 MG: 5 TABLET ORAL at 07:48

## 2018-10-02 RX ADMIN — ATORVASTATIN CALCIUM 40 MG: 40 TABLET, FILM COATED ORAL at 17:05

## 2018-10-02 NOTE — SOCIAL WORK
SARA notified by [facility] that patient's daughter Casper Esteves is in patient's room and would like to know the private pay rate  SW met with patient and patient's daughter Casper Esteves and discussed private pay rate as well as going over in detail other options such as home health aide care or assisted living  Both patient and daughter receptive to education and very appreciative of the information provided to them  SW discussed meeting with the PTA as well to discuss recommendations for patient's safety  SW went to get therapist and PTA and SW returned to the room when Casper Esteves had received a call from [place], who was finally able to get a hold of Adah Lower to complete the appeal  SW and PTA respected privacy and left the room to give time for the appeal  After appeal was completed, Casper Esteves contacted SW's office and advised that appeal is finished  PTA was busy with another patient so SW went to room and continued to discuss PTA recommendations, also provided daughter with [facility] resources, listing of ALFs and listing of private-pay home health aide agencies  SW advised that PTA and OT recommended 24/7 supervision for patient  Casper Esteves reports that if insurance denies patient's continued stay, that she would plan to bring patient home and determine if her brother can stay with patient 24/7  Casper Esteves also reports that there are home adjustments being made currently (HRs around bathroom and toilet) but her brother  Casper Esteves reports when brother would no longer be able to stay with patient, she would potentially hire a HHA  SW also discussed contacted [facility] on Aging to determine if patient would be eligible for any covered HHA hours  SARA notified  that patient was given case #[id number]  Information to be sent to [facility] when [facility] is given  Patient's daughter Casper Esteves would like to be informed of insurance decision #[id number]   SARA advised that if family would like to pay privately for [facility] stay if insurance is denied, would provide contact information of billing office to work out a payment plan  Aurther Adam is agreeable

## 2018-10-02 NOTE — SOCIAL WORK
Late note from yesterday 10/1  SARA informed patient's daughter Cushing that patient had received a [address] with LCD 10/3  [place] would not like patient to appeal [employer] until her sister Casper Esteves is on the unit, also would like to hire a  with appeal process  SW advised that for appeal to occur, must be completed before 12 PM 10/2  [place] reports that Casper Esteves will take off work  SARA attempted multiple times to discuss the appeal process, Cushing felt that staff at [facility] were "trying to kick out her Dad"  SW advised this was an insurance decision  Casper Esteves continued to address the her dad is unsafe to return home and that if [facility] sent her dad home this would be an unsafe discharge plan  SW attempted to discuss alternative living arrangements (SAVI), Cushing did not want to hear about this, continued to discuss that she felt that PT was not working on appropriate home set up and steps  SW directed Cushing to contact PT office to discuss with patient's therapist, provided number and physical therapist names  Cushing requested to contact insurance to discuss their decision  SW is agreeable and also provided number for CM to directly discuss insurance process  SW provided Cushing with Ira CM contact information  Per CM, Cushing was in contact and requested that Ira is with family when patient appeals insurance decision, does not want SW present at that time  SW will continue to follow patient

## 2018-10-02 NOTE — PLAN OF CARE
Problem: PHYSICAL THERAPY ADULT  Goal: Performs mobility at highest level of function for planned discharge setting  See evaluation for individualized goals  Treatment/Interventions: ADL retraining, Functional transfer training, LE strengthening/ROM, Elevations, Therapeutic exercise, Endurance training, Cognitive reorientation, Patient/family training, Equipment eval/education, Bed mobility, Gait training, Spoke to nursing, OT, Family, Spoke to case management (Speak to )  Equipment Recommended: Other (Comment) (To be determined)       See flowsheet documentation for full assessment, interventions and recommendations  Prognosis: Fair  Problem List: Decreased strength, Impaired balance, Decreased safety awareness  Assessment: Pt was seen on unit for PT treatment session  Pt pleasant and agreeable to therapy  Pts gait is unsteady w/o RW support  Pt does better w/ RW  Pt is doing well on stairs  Pt did perform 2 steps w/ 2 hands on R rail, and also performed 2 hands on L rail 2* pt reporting he has a Left rail and the wall is on the Right  Barriers to Discharge: Inaccessible home environment, Decreased caregiver support (Lives alone, 4 [address] home)                See flowsheet documentation for full assessment

## 2018-10-02 NOTE — SOCIAL WORK
LATE ENTRY:  CM received a call from daughter Radha Mahan from [place] yesterday afternoon regarding concerns she had of pt being d/c home even with VNA discussing issues with entering the house including a stone/grass like area prior to the steps to enter the house which is unsafe for a walker to be used; the steps (2) are a rock/slate 7" step without any handrails which has not been attempted in therapy to date (the handrail available runs perpendicular to the steps and is atop the 2nd step); a door which needs to be manually kept open by sliding a metal tag, then to step up into the foyer like area and then manually closing the previously opened door  Daughter also had concerns with pt's ability to use a shower chair within his shower and ability to maneuver around as needed  Concerns voiced regarding the ongoing butt catheter-explained what is documented by the Urologist and would discuss a voiding trial with nursing  Questioned why possible adjustments that could be beneficial within the house were not given by therapy  CM discussed the process for performing an appeal, sending her a copy of the verbiage of miki [facility]- highlighted points to be said when making the appeal in hopes of the decision being overturned  Offered assistance with performing appeal if sister unable to come to [facility] to assist pt to perform same with verbal agreement given should that be needed- Radha Mahan to call in a m with decision re: same  Verbal appreciation given for the information given- will follow up in a m

## 2018-10-02 NOTE — SOCIAL WORK
PT/OT reported in morning meeting that patient is unsafe to return home to live alone  Recommending 24/7 supervision  SW notified CM

## 2018-10-02 NOTE — SOCIAL WORK
Call this a m  From Mat Jean who informed this CM that sister to be present on [facility] to assist pt with appeal call to [facility] [place] and [facility]- asked for information of insurance so to call to obtain information as to the situation surrounding the cutting of services- same provided  Asked that there are no interruptions during the phone call with CM calling Therapy requesting no therapies be done at 10a m  (time Mat Jean informed this writer that sister would be at [facility] to aide with appeal)- informed by head of therapy that no therapies are scheduled for that time  Call to nurses station to inform of call to be made at 10a m and for staff to accommodate pt's rite to privacy for the appeal     Mat Jean called again around 11a m upset that people had gone in and out of the room prior to their ability to call [facility] Mamadou to enter an appeal    Mat Jean began with threats to get  and an umbudsman involved as well as stated she was to make an official grievance with [employer] against [facility]  Upset and stating of many lies being told to the pt and family re: ART, construction etc    Emotional support attempted to be given with daughter becoming more calm  CM called TCF  to inform them to please allow complete privacy to the pt to make this call without interruptions- DON notified of the information surrounding the call  CM was called by Mat Jean at 12:00 informing this CM of the call being recorded as instructed to do by a   Inquired re: information of physician's treating pt with information and phone numbers provided  She started to mandate that a conference call needs to take place involving the Dr's in pt's care to explain the current situation and what is to be done    She then became irate, speaking loudly and accusing this CM/DON of stalling to give information re: father's care and now because of this the time to start an appeal has passed and "you and the DON will get exactly what you wanted   this was your motive"  Attempted to speak with daughter in a calm fashion however she continued to escalate  CM in process of giving further information as requested however was not being accepted with her continually speaking over this CM  Attempt to give Director of Social Service's phone number and the call was dropped (unsure if daughter intentionally dropped call or if weather related)

## 2018-10-02 NOTE — PLAN OF CARE
Problem: OCCUPATIONAL THERAPY ADULT  Goal: Performs self-care activities at highest level of function for planned discharge setting  See evaluation for individualized goals  Treatment Interventions: ADL retraining, Functional transfer training, UE strengthening/ROM, Endurance training, Patient/family training, Cognitive reorientation, Equipment evaluation/education, Neuromuscular reeducation, Activityengagement  Equipment Recommended:  (TBD)       See flowsheet documentation for full assessment, interventions and recommendations  Outcome: Progressing  Limitation: Decreased ADL status, Decreased UE strength, Decreased cognition, Decreased endurance, Decreased self-care trans, Decreased high-level ADLs  Prognosis: Good  Assessment: Treatment focused on self care, toileting, bed mobility, ADL txfs, stand balance, stand tolerance, tub transfer, bed making, and item retrieval  Pt tolerated session well without pain and stable vitals  Improvement in self care tasks today as aides set pt up to complete himself  Functional transfers and mobility remain inconsistent as pt tends to place RW to the side at times  In addition, tends to step outside walker  Pt agreeable to get tub bench for tub shower at home  However, unsafe when sitting on bench and pushing walker to the side before sitting  Increased time to stand from bench  Overall pt with decreased safety, insight, and problem solving  Pt continues to make gains toward goals, but increased supervision is recommended upon discharge  Pt not safe to return home alone  Please focus on meal prep, ADL txfs, stand balance, medication, and activity tolerance during next session        OT Discharge Recommendation: Home OT (Increased supervision for safety )  OT - OK to Discharge: No

## 2018-10-02 NOTE — SOCIAL WORK
[facility] appeal received with chart faxed as requested- await determination    SW/CM to follow up

## 2018-10-02 NOTE — OCCUPATIONAL THERAPY NOTE
[address] Treatment Note     Patient Name: Licha Sensor  Today's Date: 10/2/2018  Problem List  Patient Active Problem List   Diagnosis    Endocarditis of mitral valve    Status post non-ST elevation myocardial infarction (NSTEMI)    Candida esophagitis (HCC)    Chronic diastolic (congestive) heart failure (HCC)    Type 2 diabetes mellitus without complication, without long-term current use of insulin ([facility] )    Essential hypertension    Benign prostatic hyperplasia with urinary retention    CKD (chronic kidney disease), stage III (HCC)    Atrial fibrillation (HCC)    Anemia    Coronary artery disease involving native coronary artery    JAKE (acute kidney injury) ([facility] )    Senile debility      Time: 53 mins   Vitals: SPO2 99% RA HR 93  SPO2 97% RA HR 99 after standing 10 mins  Remains in room at end of session with all needs and alarm intact  10/02/18 0850   Restrictions/Precautions   Weight Bearing Precautions Per Order No   Other Precautions Cognitive; Chair Alarm; Fall Risk   Pain Assessment   Pain Assessment 0-10   Pain Score No Pain   ADL   Grooming Assistance 7  Independent   Grooming Deficit Setup; Denture care;Brushing hair   UB Dressing Assistance 7  Independent   UB Dressing Deficit Thread RUE; Thread LUE;Pull over head;Pull down in back; Increased time to complete   LB Dressing Assistance 6  Modified independent   LB Dressing Deficit Thread RLE into pants; Thread LLE into pants; Thread RLE into underwear; Thread LLE into underwear;Pull up over hips;Don/doff R shoe;Don/doff L shoe; Increased time to complete   Toileting Assistance  6  Modified independent   Toileting Deficit Increased time to complete;Clothing management up;Clothing management down; Other (Comment)  (BM hygiene )   Light Housekeeping   Light Housekeeping MI bed making with increased time to complete  SPO2 97% RA HR 99      Functional Standing Tolerance   Time 10 mins   Activity Bed making    Bed Mobility   Supine to Sit 6  Modified independent   Additional items (Bed flat and no SR)   Sit to Supine 6  Modified independent   Additional items (Bed flat and no SR)   Transfers   Sit to Stand 6  Modified independent   Additional items Armrests; Increased time required   Stand to Sit 6  Modified independent   Additional items Armrests  (Controlled descent )   Stand pivot (Distant S; cues to take RW )   Functional Mobility   Functional Mobility (Distant S; cues to take RW )   Additional Comments Pt attempt to push RW to the side to go into bathroom  Tripped on leg of RW, but no LOB  Tub Transfers   Tub Transfers Supervision   Tub Transfers Comments Pt preferred to use bench as he plans to get for home  Unsafe sit on bench and required cues for RW placement  Uses hands to lift LE's over tub wall  Increased time to stand from bench  Cognition   Overall Cognitive Status Impaired   Arousal/Participation Cooperative   Attention Within functional limits   Comments Decreased insight and problem solving at times    Additional Activities   Additional Activities (Item retrieval MI using basket with good safety )   Additional Activities Comments F/F+ dynamic stand & F unsupported    Activity Tolerance   Activity Tolerance (Fair+)   Assessment   Assessment Treatment focused on self care, toileting, bed mobility, ADL txfs, stand balance, stand tolerance, tub transfer, bed making, and item retrieval  Pt tolerated session well without pain and stable vitals  Improvement in self care tasks today as aides set pt up to complete himself  Functional transfers and mobility remain inconsistent as pt tends to place RW to the side at times  In addition, tends to step outside walker  Pt agreeable to get tub bench for tub shower at home  However, unsafe when sitting on bench and pushing walker to the side before sitting  Increased time to stand from bench  Overall pt with decreased safety, insight, and problem solving   Pt continues to make gains toward goals, but increased supervision is recommended upon discharge  Pt not safe to return home alone  Please focus on meal prep, ADL txfs, stand balance, medication, and activity tolerance during next session  Plan   Treatment Interventions ADL retraining;Functional transfer training; Endurance training;Patient/family training;Equipment evaluation/education; Neuromuscular reeducation; Activityengagement   Goal Expiration Date 10/05/18   Treatment Day 12   Recommendation   OT Discharge Recommendation Home OT  (Increased supervision for safety )   OT - OK to Discharge No     Topher Galen, OT

## 2018-10-02 NOTE — PROGRESS NOTES
RECREATIONAL THERAPY PARTICIPATION LOG      ACTIVITY:    GAMES: Balloon Volleyball-declined  BINGO:        MUSIC STIM:        ARTS & CRAFTS:        EXERCISE:        CLUBS & MEETING:        SOCIALS:        SPIRITUAL:        INDEPENDENT:        1:1:  Resident was seen for an initial Recreational Therapy greeting and invitation to our Balloon Volleyball this morning  Resident declined to participate; resident shared his notice that his admission would not be covered financially after a certain date  Resident was very concerned about this and was anticipating his family coming in for a meeting and speaking to our   Resident's concerns were shared with our  by JAKE Wilcox

## 2018-10-02 NOTE — SOCIAL WORK
SW delivered [facility] to patient and faxed copy to [place] at [place] for appeal process and copy of [facility] as requested

## 2018-10-02 NOTE — PHYSICAL THERAPY NOTE
50 minute treatment       10/02/18 1620   Pain Assessment   Pain Assessment No/denies pain   Pain Score No Pain   Restrictions/Precautions   Weight Bearing Precautions Per Order No   General   Chart Reviewed Yes   Response to Previous Treatment Patient with no complaints from previous session  Family/Caregiver Present No   Cognition   Overall Cognitive Status Impaired   Following Commands Follows one step commands with increased time or repetition   Subjective   Subjective "that was good"-pt refering to PT treatment   Transfers   Sit to Stand 6  Modified independent   Additional items Armrests; Increased time required   Stand to Sit 6  Modified independent   Additional items Armrests; Increased time required   Stand pivot 6  Modified independent   Additional items (RW support)   Ambulation/Elevation   Gait pattern Decreased foot clearance; Improper Weight shift   Gait Assistance 5  Supervision   Assistive Device Rolling walker   Distance 175' and 75'; 10' w/o AD   Stair Management Assistance 5  Supervision   Stair Management Technique One rail R;Step to pattern  (2 steps-1 rail L; step to pattern)   Number of Stairs 2  (2 stepsw/ 2 hands on R rail; 2 steps w/ 2 hands on L rail)   Curbs 2 curb steps w/ RW and CS and cues   Activity Tolerance   Activity Tolerance Patient tolerated treatment well   Exercises   Hip Flexion Standing;20 reps;Bilateral   Hip Abduction Standing;20 reps;AROM; Bilateral   Hip Extension Standing;20 reps;AROM; Bilateral  (alternating hip ext in // bars)   Knee AROM Long Arc Quad Sitting;20 reps;AROM  (2#)   Ankle Pumps (standing heel raises)   Marching Standing;20 reps;Bilateral   Assessment   Prognosis Fair   Problem List Decreased strength; Impaired balance;Decreased safety awareness   Assessment Pt was seen on unit for PT treatment session  Pt pleasant and agreeable to therapy  Pts gait is unsteady w/o RW support  Pt does better w/ RW  Pt is doing well on stairs    Pt did perform 2 steps w/ 2 hands on R rail, and also performed 2 hands on L rail 2* pt reporting he has a Left rail and the wall is on the Right     Goals   Patient Goals to go home   LTG Expiration Date 10/04/18   Treatment Day 12   Plan   Treatment/Interventions (Continue per plan of care)   Progress Progressing toward goals   PT Frequency (6x/week)   Recommendation   Equipment Recommended Philly Bryant  (MATTEO)   Coy Cornejo, SUBHA

## 2018-10-02 NOTE — PHYSICAL THERAPY NOTE
Daughter of pt requested meeting to discuss rehab recommendations   This PTA met with daughter Reanna Izquierdo and pt - reported rec from OT and PT to be 24/7 S  at dc  Specifics given as to why this rec is being made- including, leaving burner on in the kitchen , abandoning RW and sitting too soon on shower bench, flop with poor control of descent to chair- occ tripping over walker ( after abandoning it)  Occ forgetting to use RW( and pt being very unsafe without use of RW)  Daughter receptive and understanding - pt reports " of course I know that I have to use the walker" but reminded  episodes of forgetting this    Pt will need RW at dc - SS made aware

## 2018-10-03 LAB — GLUCOSE SERPL-MCNC: 96 MG/DL (ref 70–99)

## 2018-10-03 PROCEDURE — 82948 REAGENT STRIP/BLOOD GLUCOSE: CPT

## 2018-10-03 PROCEDURE — 97535 SELF CARE MNGMENT TRAINING: CPT

## 2018-10-03 PROCEDURE — 97116 GAIT TRAINING THERAPY: CPT

## 2018-10-03 PROCEDURE — 97530 THERAPEUTIC ACTIVITIES: CPT

## 2018-10-03 RX ADMIN — FUROSEMIDE 20 MG: 20 TABLET ORAL at 08:13

## 2018-10-03 RX ADMIN — VITAMIN D, TAB 1000IU (100/BT) 1000 UNITS: 25 TAB at 08:13

## 2018-10-03 RX ADMIN — DOCUSATE SODIUM 100 MG: 100 CAPSULE, LIQUID FILLED ORAL at 17:33

## 2018-10-03 RX ADMIN — ASPIRIN 81 MG: 81 TABLET, COATED ORAL at 08:12

## 2018-10-03 RX ADMIN — Medication 250 MG: at 17:33

## 2018-10-03 RX ADMIN — FLUTICASONE PROPIONATE 1 SPRAY: 50 SPRAY, METERED NASAL at 08:14

## 2018-10-03 RX ADMIN — OXYCODONE HYDROCHLORIDE AND ACETAMINOPHEN 500 MG: 500 TABLET ORAL at 08:13

## 2018-10-03 RX ADMIN — SENNOSIDES 8.6 MG: 8.6 TABLET, FILM COATED ORAL at 08:13

## 2018-10-03 RX ADMIN — FERROUS SULFATE TAB 325 MG (65 MG ELEMENTAL FE) 325 MG: 325 (65 FE) TAB at 08:14

## 2018-10-03 RX ADMIN — FINASTERIDE 5 MG: 5 TABLET, FILM COATED ORAL at 08:13

## 2018-10-03 RX ADMIN — NYSTATIN: 100000 CREAM TOPICAL at 08:15

## 2018-10-03 RX ADMIN — GLIPIZIDE 2.5 MG: 5 TABLET ORAL at 06:50

## 2018-10-03 RX ADMIN — Medication 250 MG: at 08:13

## 2018-10-03 RX ADMIN — METOPROLOL SUCCINATE 50 MG: 50 TABLET, EXTENDED RELEASE ORAL at 08:13

## 2018-10-03 RX ADMIN — DOCUSATE SODIUM 100 MG: 100 CAPSULE, LIQUID FILLED ORAL at 08:13

## 2018-10-03 RX ADMIN — TAMSULOSIN HYDROCHLORIDE 0.4 MG: 0.4 CAPSULE ORAL at 17:33

## 2018-10-03 RX ADMIN — ATORVASTATIN CALCIUM 40 MG: 40 TABLET, FILM COATED ORAL at 17:33

## 2018-10-03 RX ADMIN — SITAGLIPTIN 50 MG: 50 TABLET, FILM COATED ORAL at 06:53

## 2018-10-03 NOTE — PLAN OF CARE
Problem: OCCUPATIONAL THERAPY ADULT  Goal: Performs self-care activities at highest level of function for planned discharge setting  See evaluation for individualized goals  Treatment Interventions: ADL retraining, Functional transfer training, UE strengthening/ROM, Endurance training, Patient/family training, Cognitive reorientation, Equipment evaluation/education, Neuromuscular reeducation, Activityengagement  Equipment Recommended:  (TBD)       See flowsheet documentation for full assessment, interventions and recommendations  Outcome: Progressing  Limitation: Decreased ADL status, Decreased UE strength, Decreased cognition, Decreased endurance, Decreased self-care trans, Decreased high-level ADLs  Prognosis: Good  Assessment: Patient participated in Skilled OT session this date with interventions consisting of ADL re training with the use of correct body mechnaics, safety awareness and fall prevention techniques, increase standing tolerance time with unilateral UE support to complete sink level ADLs and increase dynamic sit/ stand balance during functional activity    Patient agreeable to OT treatment session, upon arrival patient was found seated OOB to Chair  In comparison to previous session, patient with improvements in ADL's/Balance   Patient requiring ocassional safety reminders  Patient continues to be functioning below baseline level, occupational performance remains limited secondary to factors listed above and increased risk for falls and injury  From OT standpoint, recommendation at time of d/c would be Home OT/ 24 hour supervision/ assist    Patient to benefit from continued Occupational Therapy treatment while on TCF to address deficits as defined above and maximize level of functional independence with ADLs and functional mobility        OT Discharge Recommendation:  (Home OT and 24/ 7 S)  OT - OK to Discharge: No      Comments: Skip Daigle

## 2018-10-03 NOTE — OCCUPATIONAL THERAPY NOTE
Occupational Therapy Treatment Note    Name:  Charlette Ag   MRN:   [id number]  Age:     80 y o  Patient Active Problem List   Diagnosis    Endocarditis of mitral valve    Status post non-ST elevation myocardial infarction (NSTEMI)    Candida esophagitis (HCC)    Chronic diastolic (congestive) heart failure (HCC)    Type 2 diabetes mellitus without complication, without long-term current use of insulin (HCC)    Essential hypertension    Benign prostatic hyperplasia with urinary retention    CKD (chronic kidney disease), stage III (HCC)    Atrial fibrillation (HCC)    Anemia    Coronary artery disease involving native coronary artery    JAKE (acute kidney injury) ([facility] [address] )    Senile debility     Endocarditis [I38]      Subjective/Goals: " You don't know what's going on I got this letter   "    OT total treatment time:54 min    Additional goals & Comments: Initial plan for Kitchen activity pt irritable and somewhat confused re: letter from insurance co , butt management c/o "not being washed yet  IVEY therefore had pt retrieve clothing and perform ADL in BR  Pt then responded to reassurance and encouragement  pt eager to learn Butt management despite confusion re: placement  Pt also performed Kitchen mob  w/ tray and good safety, able to report 911 in event of Kitchen fire  Pt performed cognitive standing activity pairing socks, successfully  Pt returned to room w/ all needs in reach and chair alarm activated  10/03/18 1434   Restrictions/Precautions   Weight Bearing Precautions Per Order No   Other Precautions Cognitive; Chair Alarm; Bed Alarm   Lifestyle   Reciprocal Relationships Family   Pain Assessment   Pain Assessment No/denies pain   Hospital Pain Intervention(s) Medication (See MAR); Repositioned; Ambulation/increased activity; Emotional support   ADL   UB Bathing Assistance (MI in standing at sink in S environment to increase safety)   LB Bathing Assistance (MI Unsupp/ Unilateral sup S environment; increase safety)   UB Dressing Assistance (S in standing doff sweatshirts, David complete 2nd bracelets)   UB Dressing Deficit (( I ) to don shirt seated)   LB Dressing Deficit ( MI to doff/don pants,boxers, assist to thread butt thru)   Toileting Comments (no need for BM;butt)   Kitchen Mobility   Kitchen-Mobility Level (MI for [employer] mob and to retrieve transport items w/ tray)   Kitchen Mobility Comments (pt able to report 911)   Functional Standing Tolerance   Time (10 min)   Activity (ADL)   Transfers   Sit to Stand 6  Modified independent   Additional items Armrests   Stand to Sit 6  Modified independent   Additional items Armrests   Stand pivot 6  Modified independent   Additional items (w/ RW)   Additional Comments (F+ Dyn, unilateral and Unsupp w/ min challenge)   Functional Mobility   Functional Mobility 6  Modified independent   Additional Comments (in S environment)   Additional items Rolling walker   Cognition   Overall Cognitive Status Impaired   Arousal/Participation Cooperative as tx progressed intitially irritable   Attention Attends with cues to redirect   Memory (pt initally irritable/ confused)   Following Commands Follows one step commands with increased time or repetition   Additional Activities   Additional Activities (MI pairing socks in standing)   Activity Tolerance   Activity Tolerance Patient tolerated treatment well   Assessment   Assessment Patient participated in Skilled OT session this date with interventions consisting of ADL re training with the use of correct body mechnaics, safety awareness and fall prevention techniques, increase standing tolerance time with unilateral UE support to complete sink level ADLs and increase dynamic sit/ stand balance during functional activity    Patient agreeable to OT treatment session, upon arrival patient was found seated OOB to Chair  In comparison to previous session, patient with improvements in ADL's/Balance    Patient requiring ocassional safety reminders  Patient continues to be functioning below baseline level, occupational performance remains limited secondary to factors listed above and increased risk for falls and injury  From OT standpoint, recommendation at time of d/c would be Home OT/ 24 hour supervision/ assist    Patient to benefit from continued Occupational Therapy treatment while on TCF to address deficits as defined above and maximize level of functional independence with ADLs and functional mobility  Plan   Treatment Interventions ADL retraining;Functional transfer training; Activityengagement   Goal Expiration Date 10/05/18   Treatment Day 13   OT Frequency (6x/wk)   Recommendation   OT Discharge Recommendation (Home OT and 24/ 7 S)   OT - OK to Discharge No would benefit from additional tx to assure consistency, D/c only if 24/7 S   LONI Esquivel    * MI for Oral care at sink in S environment to increase safety

## 2018-10-03 NOTE — SOCIAL WORK
CM received call last evening from daughter Ponce Arteaag who inquired on pt's medical team for outpatient treatment  Per [facility], pt has been followed by [address] [facility] Urology and [facility] Internal medicine (in the [facility] office)  Information given this a m  To daughter in follow up as well as being added to the AVS   Alexy Verduzco had also requested a copy of the Detailed Explanation of Non-Coverage which was sent this day to her  No decision of appeal yet received, informed Alexy Verduzco of same and to contact her once it returns

## 2018-10-03 NOTE — PHYSICAL THERAPY NOTE
Physical Therapy Daily Treatment Note and Weekly PT Progress Note       10/03/18: 55 minutes (9:37 to 10:32)   Pain Assessment   Pain Assessment No/denies pain   Pain Score No Pain   Restrictions/Precautions   Weight Bearing Precautions Per Order No   Other Precautions Cognitive; Chair Alarm; Bed Alarm;Hard of hearing;Visual impairment   General   Chart Reviewed Yes   Response to Previous Treatment Patient with no complaints from previous session     Family/Caregiver Present No   Cognition   Overall Cognitive Status WFL  (Cognition intact today)   Arousal/Participation Cooperative   Attention Attends with cues to redirect   Following Commands Follows one step commands with increased time or repetition   Subjective   Subjective (" I am waiting to hear back about my appeal")   Bed Mobility   Supine to Sit 6  Modified independent   Sit to Supine 6  Modified independent   Additional items (Cues for butt bag management)   Additional Comments (Bed mobility; HOB flat, no rail)   Transfers   Sit to Stand 6  Modified independent   Additional items (Good hand placement)   Stand to Sit 6  Modified independent   Additional items (Good hand placement; + controlled descent)   Stand pivot (SPT with RW with MOD I ( in a supervised environment)-> good RW management)   Additional items (SPT without AD with MOD I ( in a supervised environment))   Additional items (Transfers: EOB, unsured chairs, higthback chair in bedroom)   Additional Comments (Cues for butt bag management with all aspects of mobility)   Ambulation/Elevation   Gait pattern Decreased foot clearance   Gait Assistance (MOD I ( in a supervised environment;due to decreased cognition))   Distance (Amb with a RW for 220 feet x 2 )   Stair Management Technique One rail R;Step to pattern  (Bilateral hands on right HR up)   Curbs (Pt negotiated 2 curb steps with RW and Supervision)   Balance   Static Sitting (Normal ( was Good + on 9/27/18 PT Progress Note))   Dynamic Sitting (Good ( improved, was Good - on 9/27/18 PT Progress Note))   Static Standing (Fair+/Good - (improved, was Fair + on 9/27/18 PT PN))   Dynamic Standing Fair+ (improved, was Fair/Fair+ on 9/27/18 PT PN))   Ambulatory Fair+ (improved, was Fair/Fair+ on 9/27/18 PT PN))   Endurance Deficit   Endurance Deficit Yes   Endurance Deficit Description (Decreased endurance for activity)   Activity Tolerance   Activity Tolerance Patient tolerated treatment well   Assessment:  Since PT 9/27/18 PT Progress Note,  pt has been seen for 5/5 skilled PT tx sessions for therex, there act, and gait/elevation training  Improvement assessed this past week with: functional strength, sit and stand balance, activity tolerance, sit <->supine transfers, sit <-> stand transfers, SPT's and gait/elevations  Trialed mobility with RW, without RW, and with cane  Pt continues to be steadier and safer with RW usage; at this time  However pt's overall  balance continues to improve and is appropriate to continue with trialing different AD's  Today,  made this PT aware that pt's daughter Hayley Kay) stated that the pt now has 4 steps with no rails to enter/exit the home  SW was able to bring up a picture of the pt's home on [employer]  Pt confirmed that this was his home  There is 1 curb step onto stone- paved walkway  + 2 steps with BHR's that are far apart ( pt holds on to RHR up; due to positioning of storm door) + doorstoop into home  LTG's  still appropriate  Pt's memory was very clear today as compared to the last time this PT treated the pt  Per conversation with other rehab staff members and nursing staff on different shifts, pt's cognitive status waxes and wanes  Per conversation with DON, pt most likely will need to return to home with his butt bag  Pt needs constant reminders for butt bag management at this time  The other day this PT checked on pt in bedroom, when his chair alarm was sounding   Pt was observed ambulating in his room holding onto part of the RW; with the butt bag dragging on the floor behind him  As per POC, continue skilled PT x 1 week  Plan to work towards achievement of remaining LTG's and consistent achievement of other goals with pt Independent with butt bag management in prep for discharge  Discharge plans are pending at this time  If pt returns to home, recommend 24/7 supervision and assistance to decrease risk for falls, injury, or death  Prognosis Fair   Problem List Decreased strength;Decreased range of motion;Decreased endurance; Impaired balance;Decreased mobility; Decreased coordination;Decreased cognition; Impaired judgement; Impaired vision;Decreased safety awareness; Impaired hearing;Decreased skin integrity;Orthopedic restrictions;Pain   Barriers to Discharge Inaccessible home environment;Decreased caregiver support  (Waxing and waning cognitive status)   Goals   Patient Goals (" I want to know what's going on about my appeal")   STG Expiration Date (New Expiration Date 10/10/18 ( Previous Exp Date 10/4/18))   LTG Expiration Date (New Expiration Date 10/10/18 ( Previous Exp Date 10/4/18)    1  Patient will perform all bed mobility with modified independence in order to get in/out of bed  ACHIEVED   CURRENTLY: sit <->supine with ( HOB flat, no rail) with: MOD I  CURRENTLY: roll left and right  ( HOB flat, no rail) with:  MOD I     2  Patient will perform all functional transfers with modified independence in order to facilitate transfers from one surface to another  ACHIEVED ( in a supervised environment due to pt required reminders for butt bag management )  CURRENTLY: sit <->stand with: MOD I   CURRENTLY: SPT with RW and MOD I   CURRENTLY: SPT with no AD with: MOD I      3  To improve bilateral hip flexion strength form 4-/5 to at least 4/5 to improve stability in gait  ONGOING   CURRENTLY: Bilateral hip flexion: 3+/5 ( tested today 10/3/18; slight decline)     4   Patient will ambulate with modified independence x at least 200 ft with least restrictive assistive device in order to safely access all necessary areas of home and to enable walking within the home to complete activities of daily living  ACHIEVED TODAY  CURRENTLY: Today, pt ambulated with a RW for 220 feet with RW and MOD I ( in a supervised environment ; due to fluctuating cognition and this not being pt's environment and reminders needed for butt bag management)     CURRENTLY: Pt ambulated with no AD for 82 feet with:   CG A/ MIN A    CURRENTLY: Pt ambulated with SPC for 82 feet with:   CG A     5  Patient will ascend/descend 2 steps with bilateral  handrails with device prn with supervision to enter/exit home   ACHIEVED CONSISTENTLY  CURRENTLY: Pt negotiated 2 steps with bilateral hands on right HR up with Supervision     6   New Goal ( effective 9/27/18)  Pt will negotiate 2 curb steps with RW, no handrails, with Supervision ( to simulate step into foyer and step to enter home with no rails) PARTIALLY ACHIEVED ( for number of steps; but not for assistance level)  CURRENTLY: Pt negotiates 2 curb steps with RW and Close S       Treatment Day 13   Plan   Progress Progressing toward goals   PT Frequency (6x/week)   Recommendation   Equipment Recommended (RW)     Vitals:  Seated at rest: SPO2 (RA) 94 %, HR 70  After ambulating with a RW for 220 feet (seated): SPO2 (RA) 98%, HR 83      Cole Segal, PT

## 2018-10-03 NOTE — PLAN OF CARE
Problem: PHYSICAL THERAPY ADULT  Goal: Performs mobility at highest level of function for planned discharge setting  See evaluation for individualized goals  Treatment/Interventions: ADL retraining, Functional transfer training, LE strengthening/ROM, Elevations, Therapeutic exercise, Endurance training, Cognitive reorientation, Patient/family training, Equipment eval/education, Bed mobility, Gait training, Spoke to nursing, OT, Family, Spoke to case management (Speak to )  Equipment Recommended: Other (Comment) (To be determined)       See flowsheet documentation for full assessment, interventions and recommendations  Outcome: Progressing  Prognosis: Fair  Problem List: Decreased strength, Decreased range of motion, Decreased endurance, Impaired balance, Decreased mobility, Decreased coordination, Decreased cognition, Impaired judgement, Impaired vision, Decreased safety awareness, Impaired hearing, Decreased skin integrity, Orthopedic restrictions, Pain  Assessment: Pt was seen on unit for PT treatment session  Pt pleasant and agreeable to therapy  Pts gait is unsteady w/o RW support  Pt does better w/ RW  Pt is doing well on stairs  Pt did perform 2 steps w/ 2 hands on R rail, and also performed 2 hands on L rail 2* pt reporting he has a Left rail and the wall is on the Right  Barriers to Discharge: Inaccessible home environment, Decreased caregiver support (Waxing and waning cognitive status)       Assessment: Since PT 9/27/18 PT Progress Note,  pt has been seen for 5/5 skilled PT tx sessions for therex, there act, and gait/elevation training  Improvement assessed this past week with: functional strength, sit and stand balance, activity tolerance, sit <->supine transfers, sit <-> stand transfers, SPT's and gait/elevations  Trialed mobility with RW, without RW, and with cane  Pt continues to be steadier and safer with RW usage; at this time   However pt's overall  balance continues to improve and is appropriate to continue with trialing different AD's  Today,  made this PT aware that pt's daughter Mary Perrin) stated that the pt now has 4 steps with no rails to enter/exit the home  SW was able to bring up a picture of the pt's home on [employer]  Pt confirmed that this was his home  There is 1 curb step onto stone- paved walkway  + 2 steps with BHR's that are far apart ( pt holds on to RHR up; due to positioning of storm door) + doorstoop into home  LTG's  still appropriate  Pt's memory was very clear today as compared to the last time this PT treated the pt  Per conversation with other rehab staff members and nursing staff on different shifts, pt's cognitive status waxes and wanes  Per conversation with DON, pt most likely will need to return to home with his butt bag  Pt needs constant reminders for butt bag management at this time  The other day this PT checked on pt in bedroom, when his chair alarm was sounding  Pt was observed ambulating in his room holding onto part of the RW; with the butt bag dragging on the floor behind him  As per POC, continue skilled PT x 1 week  Plan to work towards achievement of remaining LTG's and consistent achievement of other goals with pt Independent with butt bag management in prep for discharge  Discharge plans are pending at this time  If pt returns to home, recommend 24/7 supervision and assistance to decrease risk for falls, injury, or death  See flowsheet documentation for full assessment

## 2018-10-03 NOTE — SOCIAL WORK
Per Jyoti Cerrato is in agreement with termination of services with LCD 10/3  SARA/DON made aware of same

## 2018-10-03 NOTE — SOCIAL WORK
Patient's daughter Liyah Rico contacted SW via   Per Liyah Rico, she is confused as to why the [facility] [employer] letter sent to her did not "match" what was said to daughter Tone Brown  SARA advised that while Tone Brown was on unit yesterday, SW arranged to meet with her and PT to discuss the recommendations based off of what was seen on the unit (patient was unsafe to be left alone)  SARA advised the recommendation for 24/7 supervision does not necessarily require a "skilled nursing" level of care  SARA advised that she reviewed in depth with Tone Brown the options the patient and family had to meet a 24/7 supervision criteria  Liyah Rico requested that SW discuss these options with her as well  SW reviewed other options when insurance coverage for nursing facilities run out, SRAA discussed patient going to a [place] contracted facility, assisted living, or hiring a HHA at home with family support  SARA advised that Tone Brown had stated that potentially her brother could stay with patient  Liyah Bensonmartita stated she is not sure how that would work out as her brother lives in [place]  SARA advised Liyah Rico that this would be up to the patient and family how they would wish to coordinate 24/7 supervision  Liyah Rico then went on to state that she would like to complete a second level appeal  SW advised patient and family has the right to complete these but if appeal was denied, patient would be responsible for payment for his days on unit not covered by insurance  SARA advised second level appeal process can take several days  SARA advised Liyah Rico that if she would like to complete appeal to let SW know, Mariahdmitry Trisha is agreeable and will talk over with her sister

## 2018-10-03 NOTE — SOCIAL WORK
SARA notified the appeal was denied  SARA met with patient to discuss, patient's son was in the room  Patient's son will be taking patient home tomorrow after 11:00 AM  Will follow-up with SARA on time  SARA contacted patient's daughter Ella Stubbs to advise of the same, she had received phonecall from [facility] as well  SARA sent referral to [facility] for RN/PT/OT and HHA evaluation  Patient's family requested as much support as patient can have

## 2018-10-03 NOTE — PROGRESS NOTES
RECREATIONAL THERAPY PARTICIPATION LOG      ACTIVITY:    GAMES:        BINGO:        MUSIC STIM:        ARTS & CRAFTS:        EXERCISE:        CLUBS & MEETING: Patient Care Meeting  SOCIALS: Family visit  SPIRITUAL: Declined, due to anticipated visit with family  INDEPENDENT: Phone call from family member  1:1:  Resident was seen for a 1:1 visit in his room, during which time resident conversed about his worries concerning staying here in the Hospital and about going home, in particular about walking at home and about having a walker when he is at home  Director of Nursing was consulted about this and he does have a walker for when he is at home  JAKE England

## 2018-10-03 NOTE — SOCIAL WORK
PT and SW met with computer with picture of patient's house  Patient has a 1-2 inch step without HR, walkway, then 2 steps with R HR then porch, then 1 regular sized step into house without HR  Once patient enters house, there is 1 regular sized step with no HR but wall to living area of single story home  PT will work on this with patient today, but recommended to patient that he has supervision on steps and assistance with opening door for safety to use walker to get into the house  SW met briefly with patient to discuss name  In system, patient's name is incorrect  Patient confirmed this is not his name  SW contacted admissions to discuss, requested a copy of photo ID  SARA discussed with patient, stated his daughter took his ID home  SW recommended having daughter bring photo ID in as this could cause issues for future hospitalizations  Patient stated "I'll think about it"

## 2018-10-04 VITALS
RESPIRATION RATE: 19 BRPM | BODY MASS INDEX: 18.52 KG/M2 | HEIGHT: 71 IN | DIASTOLIC BLOOD PRESSURE: 57 MMHG | WEIGHT: 132.28 LBS | HEART RATE: 77 BPM | OXYGEN SATURATION: 91 % | SYSTOLIC BLOOD PRESSURE: 121 MMHG | TEMPERATURE: 97.1 F

## 2018-10-04 LAB — GLUCOSE SERPL-MCNC: 101 MG/DL (ref 70–99)

## 2018-10-04 PROCEDURE — 82948 REAGENT STRIP/BLOOD GLUCOSE: CPT

## 2018-10-04 PROCEDURE — 99316 NF DSCHRG MGMT 30 MIN+: CPT | Performed by: INTERNAL MEDICINE

## 2018-10-04 RX ORDER — TAMSULOSIN HYDROCHLORIDE 0.4 MG/1
0.4 CAPSULE ORAL
Qty: 30 CAPSULE | Refills: 0 | Status: SHIPPED | OUTPATIENT
Start: 2018-10-04

## 2018-10-04 RX ORDER — AMLODIPINE BESYLATE 5 MG/1
5 TABLET ORAL DAILY
Qty: 30 TABLET | Refills: 0 | Status: SHIPPED | OUTPATIENT
Start: 2018-10-05

## 2018-10-04 RX ORDER — FLUTICASONE PROPIONATE 50 MCG
1 SPRAY, SUSPENSION (ML) NASAL DAILY
Qty: 1 BOTTLE | Refills: 0 | Status: SHIPPED | OUTPATIENT
Start: 2018-10-05

## 2018-10-04 RX ORDER — ATORVASTATIN CALCIUM 40 MG/1
40 TABLET, FILM COATED ORAL
Qty: 30 TABLET | Refills: 0 | Status: SHIPPED | OUTPATIENT
Start: 2018-10-04

## 2018-10-04 RX ORDER — ASPIRIN 81 MG/1
81 TABLET ORAL DAILY
Qty: 30 TABLET | Refills: 0 | Status: SHIPPED | OUTPATIENT
Start: 2018-10-04

## 2018-10-04 RX ORDER — METOPROLOL SUCCINATE 50 MG/1
50 TABLET, EXTENDED RELEASE ORAL DAILY
Qty: 30 TABLET | Refills: 0 | Status: SHIPPED | OUTPATIENT
Start: 2018-10-05

## 2018-10-04 RX ORDER — FERROUS SULFATE 325(65) MG
325 TABLET ORAL
Qty: 30 TABLET | Refills: 0 | Status: SHIPPED | OUTPATIENT
Start: 2018-10-05

## 2018-10-04 RX ORDER — FINASTERIDE 5 MG/1
5 TABLET, FILM COATED ORAL DAILY
Qty: 30 TABLET | Refills: 0 | Status: SHIPPED | OUTPATIENT
Start: 2018-10-05

## 2018-10-04 RX ORDER — POLYETHYLENE GLYCOL 3350 17 G/17G
17 POWDER, FOR SOLUTION ORAL DAILY PRN
Qty: 30 EACH | Refills: 0 | Status: SHIPPED | OUTPATIENT
Start: 2018-10-04

## 2018-10-04 RX ORDER — SACCHAROMYCES BOULARDII 250 MG
250 CAPSULE ORAL 2 TIMES DAILY
Qty: 30 CAPSULE | Refills: 0 | Status: SHIPPED | OUTPATIENT
Start: 2018-10-04

## 2018-10-04 RX ORDER — PANTOPRAZOLE SODIUM 40 MG/1
40 TABLET, DELAYED RELEASE ORAL
Qty: 30 TABLET | Refills: 0 | Status: SHIPPED | OUTPATIENT
Start: 2018-10-04

## 2018-10-04 RX ORDER — GLIPIZIDE 5 MG/1
2.5 TABLET ORAL
Qty: 30 TABLET | Refills: 0 | Status: SHIPPED | OUTPATIENT
Start: 2018-10-05

## 2018-10-04 RX ORDER — BENZONATATE 200 MG/1
200 CAPSULE ORAL 3 TIMES DAILY PRN
Qty: 30 CAPSULE | Refills: 0 | Status: SHIPPED | OUTPATIENT
Start: 2018-10-04

## 2018-10-04 RX ORDER — AMOXICILLIN 250 MG
1 CAPSULE ORAL 2 TIMES DAILY PRN
Qty: 60 TABLET | Refills: 0 | Status: SHIPPED | OUTPATIENT
Start: 2018-10-04

## 2018-10-04 RX ORDER — ASCORBIC ACID 500 MG
500 TABLET ORAL DAILY
Qty: 30 TABLET | Refills: 0 | Status: SHIPPED | OUTPATIENT
Start: 2018-10-05

## 2018-10-04 RX ORDER — PANTOPRAZOLE SODIUM 40 MG/1
40 TABLET, DELAYED RELEASE ORAL
Status: DISCONTINUED | OUTPATIENT
Start: 2018-10-04 | End: 2018-10-04 | Stop reason: HOSPADM

## 2018-10-04 RX ORDER — DOCUSATE SODIUM 100 MG/1
100 CAPSULE, LIQUID FILLED ORAL 2 TIMES DAILY
Qty: 30 CAPSULE | Refills: 0 | Status: SHIPPED | OUTPATIENT
Start: 2018-10-04

## 2018-10-04 RX ORDER — FUROSEMIDE 20 MG/1
20 TABLET ORAL DAILY
Qty: 30 TABLET | Refills: 0 | Status: SHIPPED | OUTPATIENT
Start: 2018-10-05

## 2018-10-04 RX ADMIN — METOPROLOL SUCCINATE 50 MG: 50 TABLET, EXTENDED RELEASE ORAL at 09:50

## 2018-10-04 RX ADMIN — FERROUS SULFATE TAB 325 MG (65 MG ELEMENTAL FE) 325 MG: 325 (65 FE) TAB at 07:53

## 2018-10-04 RX ADMIN — OXYCODONE HYDROCHLORIDE AND ACETAMINOPHEN 500 MG: 500 TABLET ORAL at 09:51

## 2018-10-04 RX ADMIN — SENNOSIDES 8.6 MG: 8.6 TABLET, FILM COATED ORAL at 09:50

## 2018-10-04 RX ADMIN — NYSTATIN: 100000 CREAM TOPICAL at 09:51

## 2018-10-04 RX ADMIN — SITAGLIPTIN 50 MG: 50 TABLET, FILM COATED ORAL at 07:53

## 2018-10-04 RX ADMIN — ASPIRIN 81 MG: 81 TABLET, COATED ORAL at 09:50

## 2018-10-04 RX ADMIN — VITAMIN D, TAB 1000IU (100/BT) 1000 UNITS: 25 TAB at 09:51

## 2018-10-04 RX ADMIN — FLUTICASONE PROPIONATE 1 SPRAY: 50 SPRAY, METERED NASAL at 09:51

## 2018-10-04 RX ADMIN — GLIPIZIDE 2.5 MG: 5 TABLET ORAL at 07:53

## 2018-10-04 RX ADMIN — Medication 250 MG: at 09:50

## 2018-10-04 RX ADMIN — FINASTERIDE 5 MG: 5 TABLET, FILM COATED ORAL at 09:50

## 2018-10-04 RX ADMIN — DOCUSATE SODIUM 100 MG: 100 CAPSULE, LIQUID FILLED ORAL at 09:50

## 2018-10-04 RX ADMIN — FUROSEMIDE 20 MG: 20 TABLET ORAL at 09:50

## 2018-10-04 RX ADMIN — PANTOPRAZOLE SODIUM 40 MG: 40 TABLET, DELAYED RELEASE ORAL at 13:12

## 2018-10-04 NOTE — ASSESSMENT & PLAN NOTE
His creatinine was 1 10 on 9/19, and then went up to 2 36 on 9/24 2/2 to urinary retention  Renal function now stable with creat 1 1-1 37  Continue butt, and follow up outpatient with urology  Also f/u with pcp to monitor renal function  Pt is on lasix 20mg daily

## 2018-10-04 NOTE — DISCHARGE SUMMARY
Progress Note - Cassidy Markham 6/21/1929, 80 y o  male MRN: [id number]    Unit/Bed#: -01 Encounter: [id number]    Primary Care Provider: Hoang Calderon MD   Date and time admitted to hospital: 9/18/2018  6:15 PM    Pt and family refused services  24 hr supervision was recommended, but daughter Regis Shepherd states they will have him evaluated by PCP  Endocarditis of mitral valve   Assessment & Plan    Recently hospitalized at [facility] for enterococcus endocarditis  Finished course of ceftriaxone and ampicillin 9/26/2018  PICC line removed  Repeat blood culture prescription printed for 10/10/18 per ID  CKD (chronic kidney disease), stage III ([facility] [address] )   Assessment & Plan    His creatinine was 1 10 on 9/19, and then went up to 2 36 on 9/24 2/2 to urinary retention  Renal function now stable with creat 1 1-1 37  Continue butt, and follow up outpatient with urology  Also f/u with pcp to monitor renal function  Pt is on lasix 20mg daily  Status post non-ST elevation myocardial infarction (NSTEMI)   Assessment & Plan    Secondary to acute anemia ==> supply demand mismatch, type 2 NSTEMI  Status post 2 units of PRBC  Transfusion at [facility]  Echocardiogram from Sep 6, 2018 reported ejection fraction to be 55% normal left ventricle chamber size inferior and inferior lateral hypokinesis  Patient underwent EGD which did not reveal a source of bleeding colonoscopy was postponed due to acuty  of condition  Discussed with Dr Don Brooks, and will keep pt on 81mg of aspirin, as hg is stable and he is at risk for cva  Outpatient cardiology and GI follow-up recommended  Anemia   Assessment & Plan      Status post 2 units of packed red blood cell at [facility]  EGD showed Candida esophagitis and patient cannot complete a colonoscopy due to acuity  of condition / NSTEMI  Continue PPI, po ferrous sulfate and vitamin C  Continue aspirin 81mg daily, and repeat cbc on Monday      Follow up with GI and pcp      Chronic diastolic (congestive) heart failure (HCC)   Assessment & Plan    Compensated on current dose of furosemide 20 mg daily  Benign prostatic hyperplasia with urinary retention   Assessment & Plan    Reviewed notes from urology 9/25/2018 by Dr Mimi Patrick  Failed voiding trial on multiple occasions  Continue tamsulosin and finasteride and maintain urinary catheter until outpatient follow-up for voiding trial +/- TURP     JAKE (acute kidney injury) ([facility] [address] )   Assessment & Plan    Acute kidney injury thought secondary to diuretics, ARB, and urinary retention  Was hydrated gently during initial part of rehab stay  Now stable on furosemide 20 mg daily  Repeat BMP on Monday  Coronary artery disease involving native coronary artery   Assessment & Plan    Continue metoprolol, norvasc, and statin       Atrial fibrillation [facility])   Assessment & Plan    Paroxysmal atrial fibrillation  He is stable  Not anticoagulation candidate given recurrent anemia, but will continue asa 81mg as discussed with Dr Neo Abbott, given pt's risk for cva  Follow up with Cardiology outpatient  Continue metoprolol succinate  Essential hypertension   Assessment & Plan    Blood pressure stable on metoprolol amlodipine and restarting of furosemide  Continue to hold ARB given acute kidney injury     Type 2 diabetes mellitus without complication, without long-term current use of insulin ([facility])   Assessment & Plan    Lab Results   Component Value Date    HGBA1C 6 6 (H) 09/19/2018     Stable; glargine discontinued at [facility] hospitalization due to hypoglycemia  Noted to have low and glucose on BMP  Continue glipizide 2 5mg daily and Januvia 50 mg daily  Candida esophagitis ([facility] [address] )   Assessment & Plan    EGD from Aug 15, 2018 reported Candida esophagitis  Patient completed 4 days course of fluconazole  Continue PPI - no complaints at this time  * Senile debility   Assessment & Plan    Received maximum benefit from inpatient rehab   For d/c home with services today   made family aware that 24/7 supervision is recommended  Discharging Physician / Practitioner: DREAD Gonzales  PCP: Dov Gill MD  Admission Date:   Admission Orders     Ordered        09/18/18 2056  SNF Short Term Admission  Once         09/18/18 1928  Inpatient Admission  Once             Discharge Date: 10/04/18    Resolved Problems  Date Reviewed: 10/4/2018    None          Consultations During Hospital Stay:  · Urology, infectious disease    Procedures Performed:     · none    Significant Findings / Test Results:     · Anemia - ongoing - stable    Incidental Findings:   · JAKE - resolved  · Urinary retention - maintain butt and f/u with urology    Test Results Pending at Discharge (will require follow up):   · none     Outpatient Tests Requested:  · CBC, BMP, blood cultures x2    Complications:  JAKE 2/2 urinary retention    Reason for Admission: Deconditioning r/t anemia and endocarditis  Hospital Course:     Kaci Joyner is a 80 y o  male patient who originally presented to the hospital on 9/18/2018 due to deconditioning related to anemia and endocarditis, requiring long term abx treatment  According to [facility] discharge summary patient was hospitalized on 08/02/18-08/23/18 with JAKE I due to volume depletion and concurrent ACE inhibitor and ARB use +obstructive uropathy with hydronephrosis, rhabdomyolysis, elevated troponin deemed to be secondary to type 2 NSTEMI /supply demand mismatch  and anemia with positive stool guaiac  Patient was evaluated by GI specialist, underwent EGD on August 15 which revealed a moderate size hiatal hernia and mild Candida esophagitis no evidence of ulcer disease found  He completed at 4 days of fluconazole , and currently on PPI   Blood culture grew E faecalis and patient underwent LAILA  which revealed mitral valve endocarditis     He was evaluated by ID specialist who recommended to continued ceftriaxone and ampicillin until September 26, PICC line was placed and patient was discharged to inpatient rehab on August 24  He completed treatement, picc line was removed and he is now stable  He has indwelling butt for urinary retention and will need outpatient f/u with cardiology, urology, GI, and PCP  Please see above list of diagnoses and related plan for additional information  Condition at Discharge: stable     Discharge Day Visit / Exam:     Subjective:  Pt denies CP, SOB, N/V/D, constipation, dysuria, bladder pain, dizziness, light-headedness  No complaints  Vitals: Blood Pressure: 121/57 (10/04/18 0733)  Pulse: 77 (10/04/18 0733)  Temperature: (!) 97 1 °F (36 2 °C) (10/04/18 0733)  Temp Source: Temporal (10/04/18 0733)  Respirations: 19 (10/04/18 0733)  Height: 5' 11" (180 3 cm) (09/18/18 1910)  Weight - Scale: 60 kg (132 lb 4 4 oz) (10/04/18 0643)  SpO2: 91 % (10/04/18 1020)  Exam:   Physical Exam   Constitutional: He is oriented to person, place, and time  He appears well-developed and well-nourished  No distress  HENT:   Head: Normocephalic and atraumatic  Eyes: Right eye exhibits no discharge  Left eye exhibits no discharge  Neck: No JVD present  Cardiovascular: Normal rate, regular rhythm, normal heart sounds and intact distal pulses  Exam reveals no gallop and no friction rub  No murmur heard  Pulmonary/Chest: Effort normal and breath sounds normal  No stridor  No respiratory distress  He has no wheezes  He has no rales  He exhibits no tenderness  Abdominal: Soft  Bowel sounds are normal  He exhibits no distension  There is no tenderness  There is no rebound and no guarding  Musculoskeletal: He exhibits no edema  Neurological: He is alert and oriented to person, place, and time  Skin: Skin is warm and dry  He is not diaphoretic  Psychiatric: He has a normal mood and affect  Discussion with Family: I answered all questions to the best of my ability   Dr Zoie Bundy also spoke with family on the phone regarding plan of care, and answered all questions to the best of his ability  Discharge instructions/Information to patient and family:   See after visit summary for information provided to patient and family  Provisions for Follow-Up Care:  See after visit summary for information related to follow-up care and any pertinent home health orders  Disposition:     Home with VNA Services (Reminder: Complete face to face encounter)    For Discharges to [facility] SNF:   · Not Applicable to this Patient - Not Applicable to this Patient    Planned Readmission: no     Discharge Statement:  I spent 35 minutes discharging the patient  This time was spent on the day of discharge  I had direct contact with the patient on the day of discharge  Greater than 50% of the total time was spent examining patient, answering all patient questions, arranging and discussing plan of care with patient as well as directly providing post-discharge instructions  Additional time then spent on discharge activities  Discharge Medications:  See after visit summary for reconciled discharge medications provided to patient and family

## 2018-10-04 NOTE — SOCIAL WORK
(Late note from yesterday 10/3)  Patient was not accepted with [facility] as they are out of the service area  SARA sent VNA referral to [facility] in [facility] as this is in patient's service area and family requested VNA services  SARA received a phonecall from Maritza this AM from [facility] regarding case and able to accept, Maritza reported paperwork was sent over by patient's primary care physician  After conversation with Maritza, patient's daughter Janay Christiansen contacted SARA and advised that the plan is to bring patient home today at 12:00 PM  Janay Christiansen reports she would like everything arranged through patient's primary care doctor and she already has an appointment set for tomorrow  Janay Елена reports she would like PCP to set up [facility] VNA  SARA advised referral was sent to [facility] and this could be arranged from [address] and Ajay Fernandopers denies and does not want AVS sent to [facility]  SARA notified [facility] of this and that everything would be arranged by PCP tomorrow  Janay Christiansen requested she is conferenced in on discharge planning call  Janay Christiansen reports she "has a medical background" and would like to discuss discharge medical questions so that "she does not have to call back tomorrow with questions"  SARA directed Janay Christiansen to the CRNP discharging patient  Janay Christiansen called SW back and advised this would not be necessary and her brother will put her on speaker phone when nurse reviews discharge  SARA agreeable, notified CRNP  SARA delivered RW to patient's room, patient signed delivery notice  Patient's son present in room  Patient was inquiring about why staff was telling him he was not going home today  SARA discussed conversation with Janay Christiansen of potential for secondary appeal but was decided not to complete appeal  Patient understanding  No further questions/concerns

## 2018-10-04 NOTE — ASSESSMENT & PLAN NOTE
Lab Results   Component Value Date    HGBA1C 6 6 (H) 09/19/2018     Stable; glargine discontinued at [facility] hospitalization due to hypoglycemia  Noted to have low and glucose on BMP  Continue glipizide 2 5mg daily and Januvia 50 mg daily

## 2018-10-04 NOTE — DISCHARGE INSTR - AVS FIRST PAGE
Please follow up with PCP as discussed  You will also need to follow up with a urologist regarding urinary retention, cardiologist for recent endocarditis, and gastroenterologist for anemia  Prescriptions for lab work were printed and results should go to your pcp

## 2018-10-04 NOTE — ASSESSMENT & PLAN NOTE
Received maximum benefit from inpatient rehab  For d/c home with services today   made family aware that 24/7 supervision is recommended

## 2018-10-04 NOTE — ASSESSMENT & PLAN NOTE
Secondary to acute anemia ==> supply demand mismatch, type 2 NSTEMI  Status post 2 units of PRBC  Transfusion at [facility]  Echocardiogram from Sep 6, 2018 reported ejection fraction to be 55% normal left ventricle chamber size inferior and inferior lateral hypokinesis  Patient underwent EGD which did not reveal a source of bleeding colonoscopy was postponed due to acuty  of condition  Discussed with Dr Darron Kolb, and will keep pt on 81mg of aspirin, as hg is stable and he is at risk for cva  Outpatient cardiology and GI follow-up recommended

## 2018-10-04 NOTE — NURSING NOTE
The discharge instructions were reviewed with the pt, the pts son, Nathan Gold, The pts girlfriend Unk Krabbe, and the pts daughter, Suellen Martin (who was present by speaker phone-called by the pts girlfriend Keron Marquezceciliajayme)  The pt stated knowledge rt butt cath care and medication regime

## 2018-10-04 NOTE — ASSESSMENT & PLAN NOTE
Status post 2 units of packed red blood cell at [facility]  EGD showed Candida esophagitis and patient cannot complete a colonoscopy due to acuity  of condition / NSTEMI  Continue PPI, po ferrous sulfate and vitamin C  Continue aspirin 81mg daily, and repeat cbc on Monday      Follow up with GI and pcp

## 2018-10-04 NOTE — PLAN OF CARE
Problem: OCCUPATIONAL THERAPY ADULT  Goal: Performs self-care activities at highest level of function for planned discharge setting  See evaluation for individualized goals  Treatment Interventions: ADL retraining, Functional transfer training, UE strengthening/ROM, Endurance training, Patient/family training, Cognitive reorientation, Equipment evaluation/education, Neuromuscular reeducation, Activityengagement  Equipment Recommended:  (TBD)       See flowsheet documentation for full assessment, interventions and recommendations  Outcome: Adequate for Discharge  Limitation: Decreased ADL status, Decreased UE strength, Decreased cognition, Decreased endurance, Decreased self-care trans, Decreased high-level ADLs  Prognosis: Good     OT Discharge Recommendation:  (Home OT and 24/ 7 S)  OT - OK to Discharge: No     OCCUPATIONAL THERAPY DISCHARGE SUMMARY    DISPOSITION: Home today  AE/DME: RW      DISCHARGE SUMMARY: Pt with scheduled discharge home today  Participated in a total of 13/13 OT sessions  Pt achieved 7/15 goals as per POC  Deficits remain in UB ADL, toileting, ADL txfs, stand balance, tub/shower txf, meal prep, and medication management  Goals not achieved due to decreased cognition and decreased safety  This is evident by the need for frequent cuing to stay inside walker, take walker with him when transferring, remembering butt bag, cues to sit safely on tub bench, cues to turn off burner in kitchen, and cues for medication management  MOCA completed and pt scored 21/20, which indicates mild safety concerns for being alone  Therefore, due to decreased safety (above mentioned areas) and MOCA score, therapy recommends 24/7 supervision and assist  Family made aware  Would also benefit from VNA services to ensure safety in own environment and maximize function  However, pt and family declined all services today  In addition, no formal plans for 24/7 supervision were identified   Per notes, family plans to discuss everything with PCP tomorrow when they have a f/u appointment  RECOMMENDATIONS: VNA services and 24/7 supervision due to decreased safety       Kenyatta Horner OT

## 2018-10-04 NOTE — ASSESSMENT & PLAN NOTE
Recently hospitalized at [address] [place] [address] [address] for enterococcus endocarditis  Finished course of ceftriaxone and ampicillin 9/26/2018  PICC line removed  Repeat blood culture prescription printed for 10/10/18 per ID

## 2018-10-04 NOTE — ASSESSMENT & PLAN NOTE
Paroxysmal atrial fibrillation  He is stable  Not anticoagulation candidate given recurrent anemia, but will continue asa 81mg as discussed with Dr Murray Garcia, given pt's risk for cva  Follow up with Cardiology outpatient  Continue metoprolol succinate

## 2018-10-04 NOTE — DISCHARGE INSTR - OTHER ORDERS
The pt has a butt catheter placed 9/24/2018  The physician ordered the butt catheter to be continued after d/c rt urinary retention

## 2018-10-04 NOTE — ASSESSMENT & PLAN NOTE
Reviewed notes from urology 9/25/2018 by Dr Lion Chavez  Failed voiding trial on multiple occasions    Continue tamsulosin and finasteride and maintain urinary catheter until outpatient follow-up for voiding trial +/- TURP

## 2018-10-04 NOTE — PROGRESS NOTES
RECREATIONAL THERAPY PARTICIPATION LOG      ACTIVITY:    GAMES:        BINGO:        MUSIC STIM:        ARTS & CRAFTS:        EXERCISE:        CLUBS & MEETING:        SOCIALS:        SPIRITUAL:        INDEPENDENT:        1:1:    Resident was greeted by Recreational Therapy staff member for D/C planning  Resident was busy packing up his belongings  Resident was encouraged to keep up with his leisure activities of interest when at home  Resident received many family visits while in our [facility] and has strong family relationships  Resident has been made aware of the benefits associated with leading a well-rounded leisure lifestyle, including increasing self-esteem and strengthening his psych-social-spiritual well-being  JAKE Rhodes  C

## 2018-10-04 NOTE — OCCUPATIONAL THERAPY NOTE
OCCUPATIONAL THERAPY DISCHARGE SUMMARY    DISPOSITION: Home today  AE/DME: RW      DISCHARGE SUMMARY: Pt with scheduled discharge home today  Participated in a total of 13/13 OT sessions  Pt achieved 7/15 goals as per POC  Deficits remain in UB ADL, toileting, ADL txfs, stand balance, tub/shower txf, meal prep, and medication management  Goals not achieved due to decreased cognition and decreased safety  This is evident by the need for frequent cuing to stay inside walker, take walker with him when transferring, remembering butt bag, cues to sit safely on tub bench, cues to turn off burner in kitchen, and cues for medication management  MOCA completed and pt scored 21/20, which indicates mild safety concerns for being alone  Therefore, due to decreased safety (above mentioned areas) and MOCA score, therapy recommends 24/7 supervision and assist  Family made aware  Would also benefit from VNA services to ensure safety in own environment and maximize function  However, pt and family declined all services today  In addition, no formal plans for 24/7 supervision were identified  Per notes, family plans to discuss everything with PCP tomorrow when they have a f/u appointment  RECOMMENDATIONS: VNA services and 24/7 supervision due to decreased safety       Emiliano Hunter OT

## 2018-10-04 NOTE — PHYSICAL THERAPY NOTE
Physical Therapy Discharge Summary        Pt is discharged from skilled PT effective today, 10/04/18  Pt left [facility] Unit to home this afternoon  Otherwise, this PT would have continued skilled PT services 1 additional week  A detailed PT Progress Note was completed yesterday, 10/3/18  Pt was not seen for any additional skilled PT tx sessions  Therefore this PT Progress Note to also serve as a Final PT discharge Summary  Please refer to PT Progress Note for details on pt's overall functional mobility status at discharge         Recommendations:   1  Home PT to re-acclimate pt back into his own environment and to maximize safe functional mobility status to decrease risk for falls and injury      2  24/7 Supervision and assist as needed for safety to decrease risk for falls,injury, and death

## 2018-10-04 NOTE — ASSESSMENT & PLAN NOTE
Acute kidney injury thought secondary to diuretics, ARB, and urinary retention  Was hydrated gently during initial part of rehab stay  Now stable on furosemide 20 mg daily  Repeat BMP on Monday

## 2018-10-09 NOTE — SOCIAL WORK
SARA received phonecall from insurance reviewer with [employer]  (O) [phone] (M) [phone] requesting fax with patient's discharge date  SW faxed  SW also received phonecall from nurse care coordinator Cornell Ramos with [address] [place] [address]   requesting discharge update #[phone]  SARA called and advised patient was discharged from facility